# Patient Record
Sex: FEMALE | Race: WHITE | Employment: PART TIME | URBAN - METROPOLITAN AREA
[De-identification: names, ages, dates, MRNs, and addresses within clinical notes are randomized per-mention and may not be internally consistent; named-entity substitution may affect disease eponyms.]

---

## 2017-06-08 ENCOUNTER — ANESTHESIA EVENT (OUTPATIENT)
Dept: SURGERY | Facility: CLINIC | Age: 22
DRG: 132 | End: 2017-06-08
Payer: COMMERCIAL

## 2017-06-08 ENCOUNTER — HOSPITAL ENCOUNTER (INPATIENT)
Facility: CLINIC | Age: 22
LOS: 1 days | Discharge: HOME | DRG: 132 | End: 2017-06-09
Attending: DENTIST | Admitting: DENTIST
Payer: COMMERCIAL

## 2017-06-08 ENCOUNTER — ANESTHESIA (OUTPATIENT)
Dept: SURGERY | Facility: CLINIC | Age: 22
DRG: 132 | End: 2017-06-08
Payer: COMMERCIAL

## 2017-06-08 DIAGNOSIS — M26.02 MAXILLARY HYPOPLASIA: Primary | ICD-10-CM

## 2017-06-08 PROCEDURE — 25000128 H RX IP 250 OP 636: Performed by: DENTIST

## 2017-06-08 PROCEDURE — 27210794 ZZH OR GENERAL SUPPLY STERILE: Performed by: DENTIST

## 2017-06-08 PROCEDURE — 71000012 ZZH RECOVERY PHASE 1 LEVEL 1 FIRST HR: Performed by: DENTIST

## 2017-06-08 PROCEDURE — 36000093 ZZH SURGERY LEVEL 4 1ST 30 MIN: Performed by: DENTIST

## 2017-06-08 PROCEDURE — 25000566 ZZH SEVOFLURANE, EA 15 MIN: Performed by: DENTIST

## 2017-06-08 PROCEDURE — 25000128 H RX IP 250 OP 636: Performed by: NURSE ANESTHETIST, CERTIFIED REGISTERED

## 2017-06-08 PROCEDURE — 25000128 H RX IP 250 OP 636: Performed by: ANESTHESIOLOGY

## 2017-06-08 PROCEDURE — 37000008 ZZH ANESTHESIA TECHNICAL FEE, 1ST 30 MIN: Performed by: DENTIST

## 2017-06-08 PROCEDURE — 0NUR0JZ SUPPLEMENT MAXILLA WITH SYNTHETIC SUBSTITUTE, OPEN APPROACH: ICD-10-PCS | Performed by: DENTIST

## 2017-06-08 PROCEDURE — 25000125 ZZHC RX 250: Performed by: ANESTHESIOLOGY

## 2017-06-08 PROCEDURE — C1713 ANCHOR/SCREW BN/BN,TIS/BN: HCPCS | Performed by: DENTIST

## 2017-06-08 PROCEDURE — 0NSS04Z REPOSITION LEFT MAXILLA WITH INTERNAL FIXATION DEVICE, OPEN APPROACH: ICD-10-PCS | Performed by: DENTIST

## 2017-06-08 PROCEDURE — 0NSR04Z REPOSITION MAXILLA WITH INTERNAL FIXATION DEVICE, OPEN APPROACH: ICD-10-PCS | Performed by: DENTIST

## 2017-06-08 PROCEDURE — 40000169 ZZH STATISTIC PRE-PROCEDURE ASSESSMENT I: Performed by: DENTIST

## 2017-06-08 PROCEDURE — 27210995 ZZH RX 272: Performed by: DENTIST

## 2017-06-08 PROCEDURE — 12000007 ZZH R&B INTERMEDIATE

## 2017-06-08 PROCEDURE — 71000013 ZZH RECOVERY PHASE 1 LEVEL 1 EA ADDTL HR: Performed by: DENTIST

## 2017-06-08 PROCEDURE — 25000125 ZZHC RX 250: Performed by: NURSE ANESTHETIST, CERTIFIED REGISTERED

## 2017-06-08 PROCEDURE — 36000063 ZZH SURGERY LEVEL 4 EA 15 ADDTL MIN: Performed by: DENTIST

## 2017-06-08 PROCEDURE — 25000125 ZZHC RX 250: Performed by: DENTIST

## 2017-06-08 PROCEDURE — 25000132 ZZH RX MED GY IP 250 OP 250 PS 637: Performed by: DENTIST

## 2017-06-08 PROCEDURE — 0NUS0JZ: ICD-10-PCS | Performed by: DENTIST

## 2017-06-08 PROCEDURE — 37000009 ZZH ANESTHESIA TECHNICAL FEE, EACH ADDTL 15 MIN: Performed by: DENTIST

## 2017-06-08 PROCEDURE — 25000132 ZZH RX MED GY IP 250 OP 250 PS 637: Performed by: NURSE ANESTHETIST, CERTIFIED REGISTERED

## 2017-06-08 DEVICE — IMPLANTABLE DEVICE: Type: IMPLANTABLE DEVICE | Site: MAXILLA | Status: FUNCTIONAL

## 2017-06-08 DEVICE — WIRE SURGICAL STEEL 24GA 2 DS-24: Type: IMPLANTABLE DEVICE | Site: MAXILLA | Status: FUNCTIONAL

## 2017-06-08 DEVICE — WIRE SURGICAL STEEL 26GA 0 DS-26: Type: IMPLANTABLE DEVICE | Site: MAXILLA | Status: FUNCTIONAL

## 2017-06-08 DEVICE — IMP SCR STRK CROSS 1.7X4MM SELF TAP 5017004: Type: IMPLANTABLE DEVICE | Site: MAXILLA | Status: FUNCTIONAL

## 2017-06-08 RX ORDER — SODIUM CHLORIDE, SODIUM LACTATE, POTASSIUM CHLORIDE, CALCIUM CHLORIDE 600; 310; 30; 20 MG/100ML; MG/100ML; MG/100ML; MG/100ML
INJECTION, SOLUTION INTRAVENOUS CONTINUOUS
Status: DISCONTINUED | OUTPATIENT
Start: 2017-06-08 | End: 2017-06-08 | Stop reason: HOSPADM

## 2017-06-08 RX ORDER — MAGNESIUM HYDROXIDE 1200 MG/15ML
LIQUID ORAL PRN
Status: DISCONTINUED | OUTPATIENT
Start: 2017-06-08 | End: 2017-06-08 | Stop reason: HOSPADM

## 2017-06-08 RX ORDER — ONDANSETRON 2 MG/ML
4 INJECTION INTRAMUSCULAR; INTRAVENOUS EVERY 30 MIN PRN
Status: DISCONTINUED | OUTPATIENT
Start: 2017-06-08 | End: 2017-06-08 | Stop reason: HOSPADM

## 2017-06-08 RX ORDER — OXYMETAZOLINE HYDROCHLORIDE 0.05 G/100ML
SPRAY NASAL PRN
Status: DISCONTINUED | OUTPATIENT
Start: 2017-06-08 | End: 2017-06-08

## 2017-06-08 RX ORDER — ONDANSETRON 2 MG/ML
8 INJECTION INTRAMUSCULAR; INTRAVENOUS EVERY 6 HOURS PRN
Status: DISCONTINUED | OUTPATIENT
Start: 2017-06-08 | End: 2017-06-09 | Stop reason: HOSPADM

## 2017-06-08 RX ORDER — METOCLOPRAMIDE HYDROCHLORIDE 5 MG/ML
10 INJECTION INTRAMUSCULAR; INTRAVENOUS EVERY 6 HOURS PRN
Status: DISCONTINUED | OUTPATIENT
Start: 2017-06-08 | End: 2017-06-09 | Stop reason: HOSPADM

## 2017-06-08 RX ORDER — GLYCOPYRROLATE 0.2 MG/ML
INJECTION, SOLUTION INTRAMUSCULAR; INTRAVENOUS PRN
Status: DISCONTINUED | OUTPATIENT
Start: 2017-06-08 | End: 2017-06-08

## 2017-06-08 RX ORDER — CHLORHEXIDINE GLUCONATE ORAL RINSE 1.2 MG/ML
15 SOLUTION DENTAL 2 TIMES DAILY
Status: DISCONTINUED | OUTPATIENT
Start: 2017-06-08 | End: 2017-06-09 | Stop reason: HOSPADM

## 2017-06-08 RX ORDER — FENTANYL CITRATE 50 UG/ML
INJECTION, SOLUTION INTRAMUSCULAR; INTRAVENOUS PRN
Status: DISCONTINUED | OUTPATIENT
Start: 2017-06-08 | End: 2017-06-08

## 2017-06-08 RX ORDER — METHYLPREDNISOLONE SODIUM SUCCINATE 125 MG/2ML
125 INJECTION, POWDER, LYOPHILIZED, FOR SOLUTION INTRAMUSCULAR; INTRAVENOUS EVERY 4 HOURS
Status: COMPLETED | OUTPATIENT
Start: 2017-06-08 | End: 2017-06-08

## 2017-06-08 RX ORDER — CHLORHEXIDINE GLUCONATE ORAL RINSE 1.2 MG/ML
10 SOLUTION DENTAL ONCE
Status: COMPLETED | OUTPATIENT
Start: 2017-06-08 | End: 2017-06-08

## 2017-06-08 RX ORDER — CHLORHEXIDINE GLUCONATE ORAL RINSE 1.2 MG/ML
SOLUTION DENTAL PRN
Status: DISCONTINUED | OUTPATIENT
Start: 2017-06-08 | End: 2017-06-08 | Stop reason: HOSPADM

## 2017-06-08 RX ORDER — ONDANSETRON 4 MG/1
4 TABLET, ORALLY DISINTEGRATING ORAL EVERY 6 HOURS PRN
Status: DISCONTINUED | OUTPATIENT
Start: 2017-06-08 | End: 2017-06-09 | Stop reason: HOSPADM

## 2017-06-08 RX ORDER — CEFAZOLIN SODIUM 2 G/100ML
2 INJECTION, SOLUTION INTRAVENOUS
Status: COMPLETED | OUTPATIENT
Start: 2017-06-08 | End: 2017-06-08

## 2017-06-08 RX ORDER — PROPOFOL 10 MG/ML
INJECTION, EMULSION INTRAVENOUS PRN
Status: DISCONTINUED | OUTPATIENT
Start: 2017-06-08 | End: 2017-06-08

## 2017-06-08 RX ORDER — ACETAMINOPHEN 325 MG/1
975 TABLET ORAL EVERY 8 HOURS
Status: DISCONTINUED | OUTPATIENT
Start: 2017-06-08 | End: 2017-06-09 | Stop reason: HOSPADM

## 2017-06-08 RX ORDER — NEOSTIGMINE METHYLSULFATE 1 MG/ML
VIAL (ML) INJECTION PRN
Status: DISCONTINUED | OUTPATIENT
Start: 2017-06-08 | End: 2017-06-08

## 2017-06-08 RX ORDER — OXYMETAZOLINE HYDROCHLORIDE 0.05 G/100ML
2 SPRAY NASAL 2 TIMES DAILY PRN
Status: DISCONTINUED | OUTPATIENT
Start: 2017-06-08 | End: 2017-06-09 | Stop reason: HOSPADM

## 2017-06-08 RX ORDER — NALOXONE HYDROCHLORIDE 0.4 MG/ML
.1-.4 INJECTION, SOLUTION INTRAMUSCULAR; INTRAVENOUS; SUBCUTANEOUS
Status: DISCONTINUED | OUTPATIENT
Start: 2017-06-08 | End: 2017-06-09 | Stop reason: HOSPADM

## 2017-06-08 RX ORDER — HYDROMORPHONE HYDROCHLORIDE 1 MG/ML
.3-.5 INJECTION, SOLUTION INTRAMUSCULAR; INTRAVENOUS; SUBCUTANEOUS EVERY 5 MIN PRN
Status: DISCONTINUED | OUTPATIENT
Start: 2017-06-08 | End: 2017-06-08 | Stop reason: HOSPADM

## 2017-06-08 RX ORDER — PROCHLORPERAZINE MALEATE 5 MG
5-10 TABLET ORAL EVERY 6 HOURS PRN
Status: DISCONTINUED | OUTPATIENT
Start: 2017-06-08 | End: 2017-06-09 | Stop reason: HOSPADM

## 2017-06-08 RX ORDER — METOCLOPRAMIDE 5 MG/1
10 TABLET ORAL EVERY 6 HOURS PRN
Status: DISCONTINUED | OUTPATIENT
Start: 2017-06-08 | End: 2017-06-09 | Stop reason: HOSPADM

## 2017-06-08 RX ORDER — PSEUDOEPHEDRINE HCL 60 MG
60 TABLET ORAL EVERY 6 HOURS PRN
Status: DISCONTINUED | OUTPATIENT
Start: 2017-06-08 | End: 2017-06-09 | Stop reason: HOSPADM

## 2017-06-08 RX ORDER — SODIUM CHLORIDE, SODIUM LACTATE, POTASSIUM CHLORIDE, CALCIUM CHLORIDE 600; 310; 30; 20 MG/100ML; MG/100ML; MG/100ML; MG/100ML
INJECTION, SOLUTION INTRAVENOUS CONTINUOUS
Status: DISCONTINUED | OUTPATIENT
Start: 2017-06-08 | End: 2017-06-09 | Stop reason: HOSPADM

## 2017-06-08 RX ORDER — LIDOCAINE 40 MG/G
CREAM TOPICAL
Status: DISCONTINUED | OUTPATIENT
Start: 2017-06-08 | End: 2017-06-09 | Stop reason: HOSPADM

## 2017-06-08 RX ORDER — OXYCODONE HYDROCHLORIDE 5 MG/1
5-10 TABLET ORAL
Status: DISCONTINUED | OUTPATIENT
Start: 2017-06-08 | End: 2017-06-09 | Stop reason: HOSPADM

## 2017-06-08 RX ORDER — METHYLPREDNISOLONE SODIUM SUCCINATE 125 MG/2ML
125 INJECTION, POWDER, LYOPHILIZED, FOR SOLUTION INTRAMUSCULAR; INTRAVENOUS EVERY 6 HOURS
Status: COMPLETED | OUTPATIENT
Start: 2017-06-09 | End: 2017-06-09

## 2017-06-08 RX ORDER — CEFAZOLIN SODIUM 1 G/3ML
1 INJECTION, POWDER, FOR SOLUTION INTRAMUSCULAR; INTRAVENOUS EVERY 8 HOURS
Status: DISCONTINUED | OUTPATIENT
Start: 2017-06-08 | End: 2017-06-09 | Stop reason: HOSPADM

## 2017-06-08 RX ORDER — LIDOCAINE HYDROCHLORIDE 20 MG/ML
INJECTION, SOLUTION INFILTRATION; PERINEURAL PRN
Status: DISCONTINUED | OUTPATIENT
Start: 2017-06-08 | End: 2017-06-08

## 2017-06-08 RX ORDER — REMIFENTANIL HYDROCHLORIDE 1 MG/ML
INJECTION, POWDER, LYOPHILIZED, FOR SOLUTION INTRAVENOUS PRN
Status: DISCONTINUED | OUTPATIENT
Start: 2017-06-08 | End: 2017-06-08

## 2017-06-08 RX ORDER — MORPHINE SULFATE 2 MG/ML
2-4 INJECTION, SOLUTION INTRAMUSCULAR; INTRAVENOUS
Status: DISCONTINUED | OUTPATIENT
Start: 2017-06-08 | End: 2017-06-09 | Stop reason: HOSPADM

## 2017-06-08 RX ORDER — FENTANYL CITRATE 50 UG/ML
25-50 INJECTION, SOLUTION INTRAMUSCULAR; INTRAVENOUS
Status: DISCONTINUED | OUTPATIENT
Start: 2017-06-08 | End: 2017-06-08 | Stop reason: HOSPADM

## 2017-06-08 RX ORDER — BENZOCAINE/MENTHOL 6 MG-10 MG
LOZENGE MUCOUS MEMBRANE PRN
Status: DISCONTINUED | OUTPATIENT
Start: 2017-06-08 | End: 2017-06-08 | Stop reason: HOSPADM

## 2017-06-08 RX ORDER — ONDANSETRON 4 MG/1
4 TABLET, ORALLY DISINTEGRATING ORAL EVERY 30 MIN PRN
Status: DISCONTINUED | OUTPATIENT
Start: 2017-06-08 | End: 2017-06-08 | Stop reason: HOSPADM

## 2017-06-08 RX ORDER — KETOROLAC TROMETHAMINE 30 MG/ML
30 INJECTION, SOLUTION INTRAMUSCULAR; INTRAVENOUS EVERY 6 HOURS PRN
Status: DISCONTINUED | OUTPATIENT
Start: 2017-06-08 | End: 2017-06-09 | Stop reason: HOSPADM

## 2017-06-08 RX ORDER — ONDANSETRON 2 MG/ML
INJECTION INTRAMUSCULAR; INTRAVENOUS PRN
Status: DISCONTINUED | OUTPATIENT
Start: 2017-06-08 | End: 2017-06-08

## 2017-06-08 RX ORDER — ACETAMINOPHEN 325 MG/1
650 TABLET ORAL EVERY 4 HOURS PRN
Status: DISCONTINUED | OUTPATIENT
Start: 2017-06-11 | End: 2017-06-09 | Stop reason: HOSPADM

## 2017-06-08 RX ADMIN — MIDAZOLAM HYDROCHLORIDE 2 MG: 1 INJECTION, SOLUTION INTRAMUSCULAR; INTRAVENOUS at 07:25

## 2017-06-08 RX ADMIN — PROPOFOL 200 MG: 10 INJECTION, EMULSION INTRAVENOUS at 07:31

## 2017-06-08 RX ADMIN — PROPOFOL 50 MG: 10 INJECTION, EMULSION INTRAVENOUS at 08:12

## 2017-06-08 RX ADMIN — MIDAZOLAM HYDROCHLORIDE 2 MG: 1 INJECTION, SOLUTION INTRAMUSCULAR; INTRAVENOUS at 07:31

## 2017-06-08 RX ADMIN — CEFAZOLIN SODIUM 2 G: 2 INJECTION, SOLUTION INTRAVENOUS at 07:45

## 2017-06-08 RX ADMIN — OXYMETAZOLINE HYDROCHLORIDE 2 SPRAY: 5 SPRAY NASAL at 07:33

## 2017-06-08 RX ADMIN — PHENYLEPHRINE HYDROCHLORIDE 100 MCG: 10 INJECTION, SOLUTION INTRAMUSCULAR; INTRAVENOUS; SUBCUTANEOUS at 08:32

## 2017-06-08 RX ADMIN — HYDROMORPHONE HYDROCHLORIDE 0.5 MG: 1 INJECTION, SOLUTION INTRAMUSCULAR; INTRAVENOUS; SUBCUTANEOUS at 10:01

## 2017-06-08 RX ADMIN — ROCURONIUM BROMIDE 50 MG: 10 INJECTION INTRAVENOUS at 07:31

## 2017-06-08 RX ADMIN — PROPOFOL 50 MG: 10 INJECTION, EMULSION INTRAVENOUS at 08:14

## 2017-06-08 RX ADMIN — CHLORHEXIDINE GLUCONATE 15 ML: 1.2 RINSE ORAL at 20:23

## 2017-06-08 RX ADMIN — FENTANYL CITRATE 150 MCG: 50 INJECTION, SOLUTION INTRAMUSCULAR; INTRAVENOUS at 07:31

## 2017-06-08 RX ADMIN — METHYLPREDNISOLONE SODIUM SUCCINATE 125 MG: 125 INJECTION, POWDER, FOR SOLUTION INTRAMUSCULAR; INTRAVENOUS at 12:57

## 2017-06-08 RX ADMIN — SODIUM CHLORIDE 250 MG: 9 INJECTION, SOLUTION INTRAVENOUS at 07:27

## 2017-06-08 RX ADMIN — LIDOCAINE HYDROCHLORIDE 0.3 ML: 10 INJECTION, SOLUTION EPIDURAL; INFILTRATION; INTRACAUDAL; PERINEURAL at 06:39

## 2017-06-08 RX ADMIN — PHENYLEPHRINE HYDROCHLORIDE 50 MCG: 10 INJECTION, SOLUTION INTRAMUSCULAR; INTRAVENOUS; SUBCUTANEOUS at 08:44

## 2017-06-08 RX ADMIN — METHYLPREDNISOLONE SODIUM SUCCINATE 125 MG: 125 INJECTION, POWDER, FOR SOLUTION INTRAMUSCULAR; INTRAVENOUS at 16:35

## 2017-06-08 RX ADMIN — METHYLPREDNISOLONE SODIUM SUCCINATE 125 MG: 125 INJECTION, POWDER, FOR SOLUTION INTRAMUSCULAR; INTRAVENOUS at 20:23

## 2017-06-08 RX ADMIN — PHENYLEPHRINE HYDROCHLORIDE 100 MCG: 10 INJECTION, SOLUTION INTRAMUSCULAR; INTRAVENOUS; SUBCUTANEOUS at 08:24

## 2017-06-08 RX ADMIN — PHENYLEPHRINE HYDROCHLORIDE 100 MCG: 10 INJECTION, SOLUTION INTRAMUSCULAR; INTRAVENOUS; SUBCUTANEOUS at 08:25

## 2017-06-08 RX ADMIN — SODIUM CHLORIDE, POTASSIUM CHLORIDE, SODIUM LACTATE AND CALCIUM CHLORIDE: 600; 310; 30; 20 INJECTION, SOLUTION INTRAVENOUS at 12:58

## 2017-06-08 RX ADMIN — CEFAZOLIN SODIUM 1 G: 1 INJECTION, POWDER, FOR SOLUTION INTRAMUSCULAR; INTRAVENOUS at 16:35

## 2017-06-08 RX ADMIN — REMIFENTANIL HYDROCHLORIDE 72.5 MCG: 1 INJECTION, POWDER, LYOPHILIZED, FOR SOLUTION INTRAVENOUS at 08:18

## 2017-06-08 RX ADMIN — DEXMEDETOMIDINE HYDROCHLORIDE 0.3 MCG/KG/HR: 100 INJECTION, SOLUTION INTRAVENOUS at 07:28

## 2017-06-08 RX ADMIN — ACETAMINOPHEN 975 MG: 325 TABLET, FILM COATED ORAL at 18:52

## 2017-06-08 RX ADMIN — GLYCOPYRROLATE 0.6 MG: 0.2 INJECTION, SOLUTION INTRAMUSCULAR; INTRAVENOUS at 09:10

## 2017-06-08 RX ADMIN — CHLORHEXIDINE GLUCONATE 10 ML: 1.2 RINSE ORAL at 06:40

## 2017-06-08 RX ADMIN — LIDOCAINE HYDROCHLORIDE 100 MG: 20 INJECTION, SOLUTION INFILTRATION; PERINEURAL at 07:31

## 2017-06-08 RX ADMIN — REMIFENTANIL HYDROCHLORIDE 0.2 MCG/KG/MIN: 1 INJECTION, POWDER, LYOPHILIZED, FOR SOLUTION INTRAVENOUS at 08:15

## 2017-06-08 RX ADMIN — HYDROMORPHONE HYDROCHLORIDE 0.5 MG: 1 INJECTION, SOLUTION INTRAMUSCULAR; INTRAVENOUS; SUBCUTANEOUS at 09:29

## 2017-06-08 RX ADMIN — SODIUM CHLORIDE, POTASSIUM CHLORIDE, SODIUM LACTATE AND CALCIUM CHLORIDE: 600; 310; 30; 20 INJECTION, SOLUTION INTRAVENOUS at 08:24

## 2017-06-08 RX ADMIN — SODIUM CHLORIDE, POTASSIUM CHLORIDE, SODIUM LACTATE AND CALCIUM CHLORIDE: 600; 310; 30; 20 INJECTION, SOLUTION INTRAVENOUS at 06:39

## 2017-06-08 RX ADMIN — NEOSTIGMINE METHYLSULFATE 3 MG: 1 INJECTION INTRAMUSCULAR; INTRAVENOUS; SUBCUTANEOUS at 09:10

## 2017-06-08 RX ADMIN — ONDANSETRON 4 MG: 2 INJECTION INTRAMUSCULAR; INTRAVENOUS at 09:02

## 2017-06-08 RX ADMIN — HYDROMORPHONE HYDROCHLORIDE 0.3 MG: 1 INJECTION, SOLUTION INTRAMUSCULAR; INTRAVENOUS; SUBCUTANEOUS at 10:30

## 2017-06-08 RX ADMIN — FENTANYL CITRATE 50 MCG: 50 INJECTION, SOLUTION INTRAMUSCULAR; INTRAVENOUS at 08:12

## 2017-06-08 RX ADMIN — OXYMETAZOLINE HYDROCHLORIDE 2 SPRAY: 5 SPRAY NASAL at 07:25

## 2017-06-08 RX ADMIN — SODIUM CHLORIDE, POTASSIUM CHLORIDE, SODIUM LACTATE AND CALCIUM CHLORIDE: 600; 310; 30; 20 INJECTION, SOLUTION INTRAVENOUS at 20:23

## 2017-06-08 ASSESSMENT — LIFESTYLE VARIABLES: TOBACCO_USE: 0

## 2017-06-08 ASSESSMENT — ENCOUNTER SYMPTOMS: SEIZURES: 0

## 2017-06-08 NOTE — ANESTHESIA PREPROCEDURE EVALUATION
Anesthesia Evaluation     . Pt has had prior anesthetic.     History of anesthetic complications: family history of awareness.          ROS/MED HX    ENT/Pulmonary:      (-) tobacco use and sleep apnea   Neurologic:      (-) seizures   Cardiovascular:        (-) hypertension   METS/Exercise Tolerance:     Hematologic:         Musculoskeletal:         GI/Hepatic:        (-) GERD and liver disease   Renal/Genitourinary:      (-) renal disease   Endo:      (-) Type II DM and thyroid disease   Psychiatric:         Infectious Disease:        (-) Recent Fever   Malignancy:         Other:                     Physical Exam  Normal systems: cardiovascular, pulmonary and dental    Airway   Mallampati: III  TM distance: >3 FB  Neck ROM: full    Dental     Cardiovascular       Pulmonary                     Anesthesia Plan      History & Physical Review  History and physical reviewed and following examination; no interval change.    ASA Status:  1 .    NPO Status:  > 8 hours    Plan for General and ETT with Propofol induction. Maintenance will be Balanced.    PONV prophylaxis:  Ondansetron (or other 5HT-3) and Dexamethasone or Solumedrol  Versed pre op   Afrin pre op  precedex gtt      Postoperative Care  Postoperative pain management:  IV analgesics.      Consents  Anesthetic plan, risks, benefits and alternatives discussed with:  Patient and Parent (Mother and/or Father)..                          .

## 2017-06-08 NOTE — IP AVS SNAPSHOT
[facility] [facility] [facility]    [address] [place] [address]    Phone:  [phone]                                       After Visit Summary   6/8/2017    Robert Bazzi    MRN: [id number]           After Visit Summary Signature Page     I have received my discharge instructions, and my questions have been answered. I have discussed any challenges I see with this plan with the nurse or doctor.    ..........................................................................................................................................  Patient/Patient Representative Signature      ..........................................................................................................................................  Patient Representative Print Name and Relationship to Patient    ..................................................               ................................................  Date                                            Time    ..........................................................................................................................................  Reviewed by Signature/Title    ...................................................              ..............................................  Date                                                            Time

## 2017-06-08 NOTE — BRIEF OP NOTE
[facility] Brief Operative Note    Pre-operative diagnosis: MAXILLARY HYPOPLASIS, MANDIBULAR HYPERPLASIA     Post-operative diagnosis maxillary hypoplasia     Procedure: Procedure(s):  LEF FORT ONE OSTEOTOMY - Wound Class: II-Clean Contaminated   Surgeon(s): Surgeon(s) and Role:     * Murray Tran DDS - Primary     * Zhou Chaudhari DDS - Assisting   Estimated blood loss: * No values recorded between 6/8/2017  8:03 AM and 6/8/2017  9:23 AM *    Specimens: * No specimens in log *   Findings: As dictated

## 2017-06-08 NOTE — ANESTHESIA POSTPROCEDURE EVALUATION
Patient: Robert Bazzi    Procedure(s):  LEF FORT ONE OSTEOTOMY - Wound Class: II-Clean Contaminated    Diagnosis:MAXILLARY HYPOPLASIS, MANDIBULAR HYPERPLASIA    Diagnosis Additional Information: No value filed.    Anesthesia Type:  General, ETT    Note:  Anesthesia Post Evaluation    Patient location during evaluation: PACU  Patient participation: Able to fully participate in evaluation  Level of consciousness: awake and alert  Pain management: satisfactory to patient  Airway patency: patent  Cardiovascular status: hemodynamically stable  Respiratory status: acceptable and unassisted  Hydration status: balanced  PONV: none     Anesthetic complications: None          Last vitals:  Vitals:    06/08/17 1040 06/08/17 1052 06/08/17 1100   BP: 147/88  (P) 132/80   Pulse:   (P) 85   Resp: 16  (P) 14   Temp: 37.1  C (98.8  F)  (P) 37  C (98.6  F)   SpO2: 98% 99% (P) 98%         Electronically Signed By: Robert Nathan MD  June 8, 2017  11:08 AM

## 2017-06-08 NOTE — OP NOTE
PREOPERATIVE DIAGNOSES:     1.  Maxillary hypoplasia.   2.  Mandibular prognathism.   3.  Class III skeletal malocclusion.      POSTOPERATIVE DIAGNOSES:     1.  Maxillary hypoplasia.   2.  Mandibular prognathism.   3.  Class III skeletal malocclusion.      PROCEDURE:  Maxillary LeFort I level advancement with rigid fixation and elastic intermaxillary fixation.      SURGEON:  Zhou Chaudhari DDS      ASSISTANT:  Murray Tran DDS, MD      ESTIMATED BLOOD LOSS:  100 cc, none of which was replaced.        DESCRIPTION OF PROCEDURE:  The patient Robert Bazzi was taken to the operating room at [facility] on 06/08/2017.  He was placed in the supine position on the operating room table, and a general anesthesia was established.  Prepping and draping were done in the usual manner for an intraoral orthognathic surgical procedure, and 2% lidocaine with epinephrine was infiltrated into the maxillary labial sulcus for purposes of hemostasis.  Reverse Trendelenburg positioning was utilized.  A throat pack was placed.        A #15 blade was used to make an incision in the maxillary vestibule.  This was carried down to bone with electrocautery.  Lateral maxillary wall exposure was performed with careful preservation of mucosa at the piriform rim and piriform region.        LeFort I level cuts were made through the lateral wall, medial wall, posterior wall, and nasal crest of maxilla with down-fracture and mobilization.  Both descending palatine arteries were identified and cauterized.  Further mobilization was accomplished, and the patient was placed in temporary intermaxillary fixation with a surgical splint and 24-gauge wire and 26-gauge wire.        We wanted to first determine if we could accomplish the desired correction in the maxilla only.  It became apparent that that would be the best approach.  We positioned the maxilla with careful notching at the zygomatic buttress region to create a mortise locking  effect in the posterior maxilla.  We impacted the anterior maxilla approximately 3-4 mm for good bone overlap and positioning.  The nasal septum was reduced, and the anterior nasal spine was reduced to accommodate the Le Fort I advancement.        The maxilla was then positioned with two 24-gauge direct bone wires at the zygomatic buttress and 2 custom-bent Bennie plates of the Marquise Mouse variety with 2 screws along the lateral maxillary wall and 2 screws into the advanced re-positioned maxilla.  These screws were all 4 mm in length.  The fixation was released, and the occlusion was found to be as per the model surgery.  It was our decision that we did not have to do the mandible.  This will be better from an airway standpoint.        The surgical areas were irrigated and suctioned, and the wound was closed with a running 4-0 Vicryl.  The patient was placed in light elastics of the camel strength with a box pattern, 1 elastic on the left and 2 elastics on the right.  This concluded the procedure without complication.  The patient was taken to the Recovery Room in satisfactory condition.      PLAN:  The patient will be extubated as per Anesthesia protocol in the Recovery Room.         MARAH SMITH DDS, MD             D: 2017 09:23   T: 2017 13:31   MT: HALINA#[id number]      Name:     ALEXIS LYLES   MRN:      [id number]        Account:        [id number]   :      1995           Procedure Date: 2017      Document: [id number]

## 2017-06-08 NOTE — ANESTHESIA CARE TRANSFER NOTE
Patient: Robert Bazzi    Procedure(s):  LEF FORT ONE OSTEOTOMY - Wound Class: II-Clean Contaminated    Diagnosis: MAXILLARY HYPOPLASIS, MANDIBULAR HYPERPLASIA    Diagnosis Additional Information: No value filed.    Anesthesia Type:   General, ETT     Note:  Airway :Face Mask  Patient transferred to:PACU  Comments: Transferred to PACU, p(t placed in side lying position, spontaneous respirations, 10L oxygen via facemask.  All monitors and alarms on and functioning, VSS.  Patient awake, comfortable.  Report to PACU RN.      Vitals: (Last set prior to Anesthesia Care Transfer)    CRNA VITALS  6/8/2017 0859 - 6/8/2017 0942      6/8/2017             Resp Rate (set): 10                Electronically Signed By: SCOTT Emmanuel CRNA  June 8, 2017  9:42 AM

## 2017-06-08 NOTE — CONSULTS
Alexis Bazzi is a 21-year-old gentleman who underwent Le Fort I osteotomy advancement  this morning under the care of Walter Chaudhari DDS and Murray Tran DDS, MD.  Surgery went in noncomplicated fashion and his postoperative course has been stable.  He is resting comfortably but easily arousable.  His edema is minimal.  His maxilla is pink.  His midlines are on center and his occlusion is excellent.  His tooth-lip relationship and vertical maxillary position is ideal.  Overall, this is excellent progress and he will be continued on his supportive care and intravenous antibiotic and steroid protocols.  We anticipate discharge midday tomorrow.         WALTER CHAUDHARI DDS             D: 2017 14:22   T: 2017 15:06   MT: JUAN PABLO      Name:     ALEXIS BAZZI   MRN:      [id number]        Account:       [id number]   :      1995           Consult Date:  2017      Document: [id number]

## 2017-06-08 NOTE — PROGRESS NOTES
Admission medication history interview status for the 6/8/2017  admission is complete. See EPIC admission navigator for prior to admission medications     Medication history source reliability:Good    Medication history interview source(s):Patient    Medication history resources (including written lists, pill bottles, clinic record):None    Primary pharmacy.[facility]    Additional medication history information not noted on PTA med list :None    Time spent in this activity: 35 minutes    Prior to Admission medications    Not on File

## 2017-06-08 NOTE — CONSULTS
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  Diagnoses include maxillary hypoplasia, mandibular prognathism, class III malocclusion.  Our plan is for a maxillary LeFort I level advancement with rigid fixation combined with a bilateral sagittal split mandibular set back osteotomy.  We will look at a maxillary LeFort I osteotomy only to solve his occlusal discrepancy; however, we are of the opinion he likely will need a mandibular osteotomy as well.  The decision will be based upon stability and bone thickness at the piriform rim.  Preoperative orthodontics has been done by Dr. Madhav Mayen to align and level teeth in the maxillary mandibular skeletal structures.  Preoperative history and physical has revealed no contraindications to our planned procedure.  Surgeons will be Zhou Chaudhari DDS and Marah Smith DDS, MD.  The family understands we will do our very best; however, no guarantees can be made regarding a good result nor freedom from a complication.  Surgical splints have been constructed by Dr. Zhou Chaudhari in conference with Dr. Madhav Mayen.  Those splints were tried in yesterday and found to fit satisfactorily.  Informed consent has been reviewed and signed.        We have reviewed hospitalization, anesthesia and the risks of the maxillary osteotomy and mandibular osteotomy in terms of nerve issues, sinus problems, TMJ problems, hardware removal, endodontic therapy to teeth, relapse, bleeding, swelling, infection and other details.  Our plan is for rigid fixation and elastic intermaxillary fixation.  In rare instances wire intermaxillary fixation for 6 weeks is required.  We are trying to avoid that.  We are proceeding this morning with maxillary advancement and mandibular setback under general anesthesia.         MARAH SMITH DDS, MD             D: 06/08/2017 07:36   T: 06/08/2017 08:16   MT: #[id number]       Name:     ALEXIS LYLES   MRN:      [id number]        Account:       [id number]   :      1995           Consult Date:  2017      Document: [id number]

## 2017-06-08 NOTE — IP AVS SNAPSHOT
MRN:[id number]                      After Visit Summary   6/8/2017    Robert Bazzi    MRN: [id number]           Thank you!     Thank you for choosing [facility] for your care. Our goal is always to provide you with excellent care. Hearing back from our patients is one way we can continue to improve our services. Please take a few minutes to complete the written survey that you may receive in the mail after you visit with us. Thank you!        Patient Information     Date Of Birth          1995        Designated Caregiver       Most Recent Value    Caregiver    Will someone help with your care after discharge? yes    Name of designated caregiver Lori /parents    Phone number of caregiver [phone]    Caregiver address [place]      About your hospital stay     You were admitted on:  June 8, 2017 You last received care in the:  [facility] [address] [facility]    You were discharged on:  June 9, 2017       Who to Call     For medical emergencies, please call 911.  For non-urgent questions about your medical care, please call your primary care provider or clinic, [phone]  For questions related to your surgery, please call your surgery clinic        Attending Provider     Provider Specialty    Murray Tran DDS Oral Surgery       Primary Care Provider Office Phone # Fax #    Mulugeta Martinez [phone]      Further instructions from your care team       Discharge Instructions following Jaw Surgery  [facility]    Diet:    Follow instructions per the dietician.  Activities:    No contact sports.    No running.    No weight lifting.    If swimming, no head under water.    Solitary, quiet exercise is okay.    No vigorous exercise or swimming should be allowed because of the difficulty in breathing and the strain on your fixation wires (if wired).  To facilitated breathing, a decongestant stray (ex. Afrin Nasal  Spray) should be bought at a pharmacy and carried with you for cases of nasal congestion.  This should be used SPARINGLY.  Bathing/Incision Care    It is important to practice good oral hygiene.  This is VERY important to stop the possibility of rapid decay of the teeth and infection.    Post-operative day 2:  o Brush your teeth 6-8 times a day.  A small, child-sized, soft toothbrush can be used on the outside of teeth to keep the wire and teeth free of debris.  o Don t use Water-Pik until you have checked with your doctor.  o Make sure hooks and power tubes on braces are clean.  o Drink clear liquids after meals and check inside your mouth with your tongue for any debris.  o Do several warm salt-rinses daily (better than mouth rinses that include alcohol in their contents).  o Use smaller amounts of toothpaste.  What to expect:    Swelling following this type of surgery is completely normal.  Generally swelling increases for about 48-72 hours after surgery then begins to decrease gradually.  One half of the swelling will be gone in 7-10 days; however, it is normal for a small amount of swelling to persist for 4-6 weeks.    Bowel habits may change during your fixation period.  Following surgery, it is common to not have a bowel movement for several days.  If this becomes a problem consult your oral surgeon at one of your routine appointments.    Nausea is no cause for alarm.  Remember that even if you should be nauseated and vomit, everything that is in your stomach has been strained through your teeth.  However, at the first sign of persistent, significant nausea, call your oral surgeon and he/she will prescribe anti-nausea medication for you in a suppository form.    Medications which have been prescribed for you are also important.  If an antibiotic has been prescribed, it is very important to continue this preparation as directed until it has all been taken.  Also, a liquid pain medication can be taken as  "directly only if needed f or discomfort.  Call your surgeon if you have these signs or symptoms:    First sign of persistent, significant nausea    Fever/chills greater than 101 oF.    Pain not relieved with pain medicine.    With any questions or concerns.    Feel free to call the office should any problems develop.  The doctor who is on-call will be able to assist you.  Should an immediate emergency develop, go to the nearest hospital emergency room.    Take all medications and follow-up as instructed by your surgeon.    If wired, you should carry your wire cutters with you at all times to be used to cut all the vertical wires between your upper and lower teeth in case of emergency.        Pending Results     No orders found for last 3 day(s).            Statement of Approval     Ordered          06/09/17 0706  I have reviewed and agree with all the recommendations and orders detailed in this document.  EFFECTIVE NOW     Approved and electronically signed by:  Zhou Chaudhari DDS             Admission Information     Date & Time Provider Department Dept. Phone    6/8/2017 Murray Tran DDS [place] [address] [facility] [phone]      Your Vitals Were     Blood Pressure Pulse Temperature Respirations Height Weight    118/65 83 97.6  F (36.4  C) (Axillary) 16 1.803 m (5' 11\") 72.5 kg (159 lb 14.4 oz)    Pulse Oximetry BMI (Body Mass Index)                98% 22.3 kg/m2          MyChart Information     [employer] lets you send messages to your doctor, view your test results, renew your prescriptions, schedule appointments and more. To sign up, go to [email or web address] . Click on \"Log in\" on the left side of the screen, which will take you to the Welcome page. Then click on \"Sign up Now\" on the right side of the page.     You will be asked to enter the access code listed below, as well as some personal information. Please follow the directions to create your username and password.     Your " access code is: [email or web address]  Expires: 2017  9:11 AM     Your access code will  in 90 days. If you need help or a new code, please call your [facility] clinic or [phone].        Care EveryWhere ID     This is your Care EveryWhere ID. This could be used by other organizations to access your [facility] medical records  [id number]           Review of your medicines      START taking        Dose / Directions    ondansetron 4 MG ODT tab   Commonly known as:  ZOFRAN-ODT        Dose:  4 mg   Take 1 tablet (4 mg) by mouth every 6 hours as needed for nausea   Quantity:  12 tablet   Refills:  1            Where to get your medicines      These medications were sent to [facility] - [address]  [address], [place] [address]     Phone:  [phone]     ondansetron 4 MG ODT tab                Protect others around you: Learn how to safely use, store and throw away your medicines at www.disposemymeds.org.             Medication List: This is a list of all your medications and when to take them. Check marks below indicate your daily home schedule. Keep this list as a reference.      Medications           Morning Afternoon Evening Bedtime As Needed    ondansetron 4 MG ODT tab   Commonly known as:  ZOFRAN-ODT   Take 1 tablet (4 mg) by mouth every 6 hours as needed for nausea

## 2017-06-08 NOTE — PLAN OF CARE
Problem: Goal Outcome Summary  Goal: Goal Outcome Summary  Outcome: No Change  Pt still sleepy from surgery, arouses to voice, denies the need for pain medications at this time, tolerates sips of clears, ice pack and humidified face tent in place,

## 2017-06-09 VITALS
WEIGHT: 159.9 LBS | HEART RATE: 83 BPM | TEMPERATURE: 97.6 F | BODY MASS INDEX: 22.39 KG/M2 | HEIGHT: 71 IN | RESPIRATION RATE: 16 BRPM | DIASTOLIC BLOOD PRESSURE: 65 MMHG | SYSTOLIC BLOOD PRESSURE: 118 MMHG | OXYGEN SATURATION: 98 %

## 2017-06-09 PROCEDURE — 25000128 H RX IP 250 OP 636: Mod: JW | Performed by: DENTIST

## 2017-06-09 PROCEDURE — 25000132 ZZH RX MED GY IP 250 OP 250 PS 637: Performed by: DENTIST

## 2017-06-09 RX ORDER — ONDANSETRON 4 MG/1
4 TABLET, ORALLY DISINTEGRATING ORAL EVERY 6 HOURS PRN
Qty: 12 TABLET | Refills: 1 | Status: SHIPPED | OUTPATIENT
Start: 2017-06-09 | End: 2020-10-16

## 2017-06-09 RX ORDER — METHYLPREDNISOLONE SODIUM SUCCINATE 125 MG/2ML
INJECTION, POWDER, LYOPHILIZED, FOR SOLUTION INTRAMUSCULAR; INTRAVENOUS
Status: DISCONTINUED
Start: 2017-06-09 | End: 2017-06-09 | Stop reason: HOSPADM

## 2017-06-09 RX ADMIN — CEFAZOLIN SODIUM 1 G: 1 INJECTION, POWDER, FOR SOLUTION INTRAMUSCULAR; INTRAVENOUS at 07:55

## 2017-06-09 RX ADMIN — METHYLPREDNISOLONE SODIUM SUCCINATE 125 MG: 125 INJECTION, POWDER, FOR SOLUTION INTRAMUSCULAR; INTRAVENOUS at 06:24

## 2017-06-09 RX ADMIN — METHYLPREDNISOLONE SODIUM SUCCINATE 125 MG: 125 INJECTION, POWDER, FOR SOLUTION INTRAMUSCULAR; INTRAVENOUS at 00:28

## 2017-06-09 RX ADMIN — ACETAMINOPHEN 975 MG: 325 TABLET, FILM COATED ORAL at 04:09

## 2017-06-09 RX ADMIN — CHLORHEXIDINE GLUCONATE 15 ML: 1.2 RINSE ORAL at 09:47

## 2017-06-09 RX ADMIN — CEFAZOLIN SODIUM 1 G: 1 INJECTION, POWDER, FOR SOLUTION INTRAMUSCULAR; INTRAVENOUS at 00:28

## 2017-06-09 RX ADMIN — SODIUM CHLORIDE, POTASSIUM CHLORIDE, SODIUM LACTATE AND CALCIUM CHLORIDE: 600; 310; 30; 20 INJECTION, SOLUTION INTRAVENOUS at 06:24

## 2017-06-09 NOTE — DISCHARGE INSTRUCTIONS
Discharge Instructions following Jaw Surgery  [facility]    Diet:    Follow instructions per the dietician.  Activities:    No contact sports.    No running.    No weight lifting.    If swimming, no head under water.    Solitary, quiet exercise is okay.    No vigorous exercise or swimming should be allowed because of the difficulty in breathing and the strain on your fixation wires (if wired).  To facilitated breathing, a decongestant stray (ex. Afrin Nasal Spray) should be bought at a pharmacy and carried with you for cases of nasal congestion.  This should be used SPARINGLY.  Bathing/Incision Care    It is important to practice good oral hygiene.  This is VERY important to stop the possibility of rapid decay of the teeth and infection.    Post-operative day 2:  o Brush your teeth 6-8 times a day.  A small, child-sized, soft toothbrush can be used on the outside of teeth to keep the wire and teeth free of debris.  o Don t use Water-Pik until you have checked with your doctor.  o Make sure hooks and power tubes on braces are clean.  o Drink clear liquids after meals and check inside your mouth with your tongue for any debris.  o Do several warm salt-rinses daily (better than mouth rinses that include alcohol in their contents).  o Use smaller amounts of toothpaste.  What to expect:    Swelling following this type of surgery is completely normal.  Generally swelling increases for about 48-72 hours after surgery then begins to decrease gradually.  One half of the swelling will be gone in 7-10 days; however, it is normal for a small amount of swelling to persist for 4-6 weeks.    Bowel habits may change during your fixation period.  Following surgery, it is common to not have a bowel movement for several days.  If this becomes a problem consult your oral surgeon at one of your routine appointments.    Nausea is no cause for alarm.  Remember that even if you should be nauseated  and vomit, everything that is in your stomach has been strained through your teeth.  However, at the first sign of persistent, significant nausea, call your oral surgeon and he/she will prescribe anti-nausea medication for you in a suppository form.    Medications which have been prescribed for you are also important.  If an antibiotic has been prescribed, it is very important to continue this preparation as directed until it has all been taken.  Also, a liquid pain medication can be taken as directly only if needed f or discomfort.  Call your surgeon if you have these signs or symptoms:    First sign of persistent, significant nausea    Fever/chills greater than 101 oF.    Pain not relieved with pain medicine.    With any questions or concerns.    Feel free to call the office should any problems develop.  The doctor who is on-call will be able to assist you.  Should an immediate emergency develop, go to the nearest hospital emergency room.    Take all medications and follow-up as instructed by your surgeon.    If wired, you should carry your wire cutters with you at all times to be used to cut all the vertical wires between your upper and lower teeth in case of emergency.

## 2017-06-09 NOTE — CONSULTS
NUTRITION EDUCATION    REASON FOR ASSESSMENT:  Nutrition education on Jaw Surgery Diet    CURRENT DIET:  Clear Liquid Jaw Surgery Diet    NUTRITION HISTORY:  Deferred    NUTRITION DIAGNOSIS:  Food- and nutrition-related knowledge deficit R/t lack of prior exposure to information AEB recent jaw surgery.    INTERVENTIONS:    Nutrition Prescription:  Recommended adequate calories and protein to promote healing, several small meals per day, and use of high protein oral supplements.    Implementation:    Assessed learning needs, learning preferences, and willingness to learn    Nutrition Education  (Content):  a) Provided handout  What to Eat After Jaw Surgery   b) Described diet progression per guidelines listed in handout  c) Discussed importance of small meals    Medical Food Supplements - recommended use of a high protein nutritional supplement    Anticipate good compliance    Diet Education - refer to Education Flowsheet    Goals:    Patient verbalizes understanding of diet     All of the above goals met during the education session    Follow Up:    Provided RD contact information for future questions    Grace Crowe RD  Pager [phone] (M-F)            [phone] (W/E & Hol)

## 2017-06-09 NOTE — PLAN OF CARE
Problem: Goal Outcome Summary  Goal: Goal Outcome Summary  Outcome: Improving  A&Ox4. VSS on RA. Humidified face tent and ice pack in place. Denies pain. Reported minimal sore throat. Clear liquids tolerated well. Continue to monitor.

## 2017-06-09 NOTE — PROGRESS NOTES
Pt DC to home with his mom and dad.  Parents to drive the pt home.  Pt and his mom stated they understood DC instructions including medication instructions (these were provided by the MD).  VSS.

## 2017-06-09 NOTE — DISCHARGE SUMMARY
HOSPITAL COURSE:  Alexis Bazzi is a 21-year-old gentleman who underwent [place] osteotomy advancement on 2017 under the care of Walter Chaudhari DDS, and Murray Tran DDS, MD.  His surgery went in uncomplicated fashion and his postoperative course has been stable.  He had a slightly elevated blood pressure overnight but that has now fully resolved.  His pain is under control.  His airway is excellent.  He has no active epistaxis and only some dried blood in the nares.  His edema is minimal for the extent of the procedure.  His maxilla is pink and his occlusion is excellent and identical to that in the articulator setup.  His incision is healthy and airway is good.        He has begun to take p.o. liquids and should be able to support himself, but we will have nutritional consult review this as well.  I reviewed his homegoing activities, diet, hygiene, medications, elastics, followup appointment with Alexis and both parents this morning and we were able to answer all of their questions to their satisfaction.  He will be ready for discharge following his nutritional consult and completion of his intravenous antibiotic and steroid protocols.          WALTER CHAUDHARI DDS             D: 2017 07:10   T: 2017 10:11   MT: TW      Name:     ALEXIS BAZZI   MRN:      [id number]        Account:        [id number]   :      1995           Admit Date:                                       Discharge Date: 2017      Document: [id number]

## 2017-06-09 NOTE — PLAN OF CARE
Problem: Goal Outcome Summary  Goal: Goal Outcome Summary  Outcome: No Change  A&O. VSS. Mild pain; declines pain medication besides scheduled tylenol. HOB elevated. Tolerating clear liquids. Jaw bra in place. Humidity in place. Up independently to the bathroom.

## 2018-12-31 ENCOUNTER — MEDICAL CORRESPONDENCE (OUTPATIENT)
Dept: HEALTH INFORMATION MANAGEMENT | Facility: CLINIC | Age: 23
End: 2018-12-31

## 2018-12-31 ENCOUNTER — TRANSFERRED RECORDS (OUTPATIENT)
Dept: HEALTH INFORMATION MANAGEMENT | Facility: CLINIC | Age: 23
End: 2018-12-31

## 2019-01-14 ENCOUNTER — TELEPHONE (OUTPATIENT)
Dept: PLASTIC SURGERY | Facility: CLINIC | Age: 24
End: 2019-01-14

## 2019-01-14 DIAGNOSIS — F64.0 GENDER DYSPHORIA IN ADOLESCENT AND ADULT: Primary | ICD-10-CM

## 2019-01-14 NOTE — TELEPHONE ENCOUNTER
Received Referral from Teddy, Provider Amada Vaughan, for voice therapy.     Left  for patient to call back.     Mack Watson  Trans care coordinator

## 2019-01-15 NOTE — TELEPHONE ENCOUNTER
Pt has appt with Kyle in [facility] voice center on 3/15/19. Pt was also given [facility] speech center phone #. Pt did not need additional referrals or services at this time.

## 2019-02-12 ENCOUNTER — PRE VISIT (OUTPATIENT)
Dept: OTOLARYNGOLOGY | Facility: CLINIC | Age: 24
End: 2019-02-12

## 2019-02-12 NOTE — TELEPHONE ENCOUNTER
FUTURE VISIT INFORMATION      FUTURE VISIT INFORMATION:    Date: 3/15/19    Time: 1:00pm    Location: [facility]  REFERRAL INFORMATION:    Referring provider:  Amada Vaughan NP    Referring providers clinic:  [facility]    Reason for visit/diagnosis  They/Them    RECORDS REQUESTED FROM:       Clinic name Comments Records Status Imaging Status   [facility] Requested recs 2/12

## 2019-03-15 ENCOUNTER — TELEPHONE (OUTPATIENT)
Dept: OTOLARYNGOLOGY | Facility: CLINIC | Age: 24
End: 2019-03-15

## 2019-03-15 ENCOUNTER — OFFICE VISIT (OUTPATIENT)
Dept: OTOLARYNGOLOGY | Facility: CLINIC | Age: 24
End: 2019-03-15
Payer: COMMERCIAL

## 2019-03-15 DIAGNOSIS — R49.9 VOICE AND RESONANCE DISORDER: Primary | ICD-10-CM

## 2019-03-15 DIAGNOSIS — F64.0 GENDER DYSPHORIA IN ADOLESCENT AND ADULT: ICD-10-CM

## 2019-03-15 NOTE — LETTER
"3/15/2019       RE: Robert Bazzi  [address]  [facility] [place] [address]     Dear Colleague,    Thank you for referring your patient, Robert Bazzi, to the [facility]. Please see a copy of my visit note below.     [facility]  Evaluation report    Clinician: Mulugeta Sofia M.M., M.A., CCC/SLP  Referring physician:  Dr. Vaughan  Patient: Robert Bazzi  Date of Visit: 3/15/2019    HISTORY  Chief complaint: Ilan Bazzi is a 23 year old gender nonconforming individual presenting today for evaluation of voice.    Salient history: Ilan Bazzi, began to explore their identity and the fall 2018, though looking back on if they feel there were what they described as \"signs\" before this point which seem obvious in retrospect.  They has been seeing a therapist associated with transitions since November 2018, and was evaluated in order to start HRT, but has elected to postpone for the time being.  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      CURRENT SYMPTOMS INCLUDE    VOICE    Seeking broadened     No voice loss    No vocal fatigue    Most difficult to find their voice when there is increased emotion/intensity    Voice simply doesn't align with who they are    Patient denies significant dyspnea, dysphagia, dysphonia, cough and pain.     OTHER PERTINENT HISTORY    healthy    Past Medical History:   Diagnosis Date     Anxiety      Past Surgical History:   Procedure Laterality Date     ENT SURGERY      wisdom teeth extraction     LE FORT ONE N/A 6/8/2017    Procedure: LE FORT ONE;  LEF FORT ONE OSTEOTOMY;  Surgeon: Murray Tran DDS;  Location: [facility] OR       OBJECTIVE  PATIENT REPORTED " "MEASURES  Speech follow up as discussed with patient:  Dysponia SLP Goals 3/15/2019   How well does your voice align with your identity, where 0 is \"my voice doesn't align at all\", and 10 is \"my voice aligns completely\"? 4   How much does your voice problem bother you? A little bit     Patient Supplied Answers To VHI Questionnaire  Voice Handicap Index (VHI-10) 3/15/2019   My voice makes it difficult for people to hear me 0   People have difficulty understanding me in a noisy room 0   My voice difficulties restrict my personal and social life.  0   I feel left out of conversations because of my voice 2   My voice problem causes me to lose income 0   I feel as though I have to strain to produce voice 1   The clarity of my voice is unpredictable 0   My voice problem upsets me 2   My voice makes me feel handicapped 0   People ask, \"What's wrong with your voice?\" 0   VHI-10 5         PERCEPTUAL EVALUATION (CPT 13420)  POSTURE / TENSION:     jaw    neck    BREATHING:     appears within normal limits and adequate     VOICE:    Roughness: Mild Intermittent    Breathiness: Mild Consistent    Strain: Minimal    Loudness    Conversational speech:  WNL    Projected speech:  WNL    Pitch:    Conversational speech:  Mildly lowered compared to gender identity matched peers    Pitch glide: no notable breaks, good access to loft register    Resonance:    Conversational speech: intermittent laryngeal pharyngeal resonance    Singing vs. Speech:  Consistent across contexts    CAPE- Overall Severity:  12/100    COUGH/THROAT CLEARING:    Not observed    ____________________________________________________________________  Laryngeal Function Studies (CPT 52148)  Acoustic measures:  Fundamental frequency Metrics     /a/ mean F0 = 215 Hz (SD = 0.85 Hz)     /i/ mean F0 = 223 Hz (SD = 0.78 Hz)     [place] Mean f0 = 161 Hz (SD 29.05 Hz)     Glide:         Min F0 = 112 Hz        Max F0 = 364 Hz        Range = 252 Hz        Notes = Max " "F0 not representative of perceptual pitch during ascending glides subsequently    Cepstral Measures     CPPS /a/ = 25.34 dB     CPPS /i/ = 28.38 dB     CPPS \"all voiced\" = 21.71 dB     AVQI (v.3.01) = 2.25    Additional Measures     Harmonic to Noise Ratio /a/ = 30.43 dB     Harmonic to Noise Ratio: [place] passage = 14.07 dB     Jitter (local) /a/ = 0.206 %     Shimmer (local) /a/ = 1.025 %    Aerodynamic Measures     Protocol / Parameter Result Normative Mean (SD)   Vital Capacity     Expiratory Volume 4.8 Liters 3.53 (1.36) Liters   Comfortable Sustained Phonation     Mean SPL 76.67 dB 74.72 (4.81) dB   Mean Pitch 157.89 Hz 104.29 (13.23) Hz   Peak Expiratory Airflow 0.333 Lit/Sec 0.2 (0.11) Lit/Sec   Mean Expiratory Airflow 0.242 Lit/Sec 0.13 (0.08) Lit/Sec   Voicing Efficiency     Peak Air Pressure 4.25 cm H2O 7.55 (2.24) cm H2O   Mean Peak Air Pressure 3.74 cm H2O 6.08 (1.65) cm H2O   Peak Expiratory Airflow 0.427 Lit/Sec 0.42 (0.38) Lit/Sec   Mean Airflow During Voicing 0.301 Lit/Sec 0.12 (0.05) Lit/Sec   Running Speech: All Voiced Stimulus     Mean SPL 68.89 dB    Mean Pitch 174.98 Hz    Peak Expiratory Airflow 0.655 Lit/Sec    Mean Expiratory Airflow 0.185 Lit/Sec    Mean Airflow During Voicing 0.154 Lit/Sec    Running Speech: [place]     Mean SPL 65.64 dB    SPL Range 46.59 dB    Mean Pitch 167.86 Hz    Pitch Range 235.44 Hz    Peak Expiratory Airflow 0.903 Lit/Sec    Mean Expiratory Airflow 0.139 Lit/Sec    Expiratory Volume 1.47 Liters    Mean Airflow During Voicing 0.119 Lit/Sec    Peak Inspiratory Airflow -2.375 Lit/Sec    Inspiratory Volume -0.67 Liters    ____________________________________________________________________    ASSESSMENT / PLAN  IMPRESSIONS: Ilan (legal: Robert) Jluis is presenting today with R 49.9 (voice and resonance disorder) in the context of F 64.0 (gender dysphoria).  Laryngeal function studies demonstrate mildly increased trans-glottal airflow during sustained " phonation with a significant relative decrease in airflow continuity during extemporaneously speech.  The patient demonstrates good ability to modulate pitch, but average pitch tends to decline over successive statements toward habitual pitch range, and this is notably below desired gender ambiguous frequency range.  Overall however the patient demonstrates excellent awareness of patterns of voice, which is an excellent prognostic indicator for rapid ability to make progress.    RECOMMENDATIONS:     A course of speech therapy is recommended to allow the patient to achieve a healthy sustainable voice quality so that they are able to meet their personal and professional vocal demands and manner which is consistent with their identity.    Ilan Bazzi demonstrates a Good prognosis for improvement given adherence to therapeutic recommendations.     Positive indicators: positive response to therapy probes diagnosis is known to respond to treatment high level of comittment good awareness of patterns of use    Negative indicators: None    DURATION / FREQUENCY: 6 appointments with 2-4 weeks between each and 2 monthly one-hour follow-up sessions    GOALS:  Patient goal:   1. To understand the problem and fix it as much as possible  2. To achieve a voice congruent with their identity    Short-term goal(s): Within the first 4 sessions,  Ialn:  1. will demonstrate assigned laryngeal massage techniques with 80% accuracy or better with no clinician support  2. will be able to independently list key factors in maintenance of good vocal hygiene with 80% accuracy, and report on their use outside the therapy room.  3. will demonstrate semi-occluded vocal tract (SOVT) exercises with at least 80% accuracy with no clinician support  4. will accurately identify target vs. habitual voice quality during therapy tasks in 4 out of 5 trials with no clinician support  5. will demonstrate the ability to alternate between target and habitual  "voice quality given clinician cue 75% of the time during therapy tasks    Long-term goal(s): In 6 months,  Ilan will:  1. Report a week of typical activities in which they are able to maintain target voice quality 80% of the time    This treatment plan was developed with the patient who agreed with the recommendations.    _______________________________________________________________________  THERAPY NOTE (CPT 88911)  Date of Service: 3/15/2019    SUBJECTIVE / OBJECTIVE:  Please refer to my evaluation report from today's encounter for full details regarding subjective data, patient reported measures, and diagnostic findings.    THERAPEUTIC ACTIVITIES    Counseling and Education    Asked many questions about the nature of Ilan They/Them/She/her Jluis's symptoms, and I answered all of these thoroughly.    The individual nature of our therapeutic goals were discussed at length, and it was emphasized that this type of work would be a collaborative process in which I would rely on the patient's input to guide daily therapeutic targets.      Exercises to promote articulatory characteristics more in line with patient gender identity    Sounds which correlates strongly to gender dysmorphic features targeted    Alveolar consonants    Negative [employer] alternating \"dark\" versus \"light\" productions    Excellent accuracy with /t/ phoneme    Good accuracy with /s/ phoneme and minimal clinician support    Minimal to moderate clinician support required with /l/ sounds    Chant phonation was utilized intermittently to promote consistent voicing throughout    Lip retraction versus rounding was targeted and alternation to promote patient awareness of its effect on formant frequencies    Patient preferred a middle ground reduction with slightly increased range of motion of articulation    Tendency noted to allow pitch to drop to habitual range on final sounds and syllables    Patient was able to recognize this; however, " it was not targeted directly today      Concepts of an optimal regimen for practice were instructed.  o Ilan Bazzi should use an interval schedule of practice, with brief periods of practice frequently throughout each day  o [place] concepts of volitional practice to facilitate motor learning.    I provided handouts of today's therapeutic activities to facilitate practice.    ASSESSMENT/PLAN  PROGRESS TOWARD LONG TERM GOALS:   Minimal at this point, as this is first session, but good learning today    IMPRESSIONS:  Ilan Bazzi is presenting today with R 49.9 (voice and resonance disorder) in the context of F 64.0 (gender dysphoria).  Excellent work within today's session.  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  It was emphasized that despite this aptitude it would take time for motor patterns to feel habituated and patient stated they understood.    PLAN: I will see Ilan in approximately 8 weeks, or sooner as schedule permits, at which time we will target nonverbal vocalizations.     TOTAL SERVICE TIME: 120 minutes  EVALUATION OF VOICE AND RESONANCE (29542)  TREATMENT (29322)  LARYNGEAL FUNCTION STUDIES (47474)  NO CHARGE FACILITY FEE (88741)    *this report was created in part through the use of computerized dictation software, and though reviewed following completion, some typographic errors may persist.  If there is confusion regarding any of this notes contents, please contact me for clarification.*    Kyle Sofia, SLP

## 2019-03-15 NOTE — PROGRESS NOTES
" [facility]  Evaluation report    Clinician: Mulugeta Sofia M.M., M.A., CCC/SLP  Referring physician:  Dr. Vaughan  Patient: Robert Bazzi  Date of Visit: 3/15/2019    HISTORY  Chief complaint: Ilan Bazzi is a 23 year old gender nonconforming individual presenting today for evaluation of voice.    Salient history: Ilan Bazzi, began to explore their identity and the fall 2018, though looking back on if they feel there were what they described as \"signs\" before this point which seem obvious in retrospect.  They has been seeing a therapist associated with transitions since November 2018, and was evaluated in order to start HRT, but has elected to postpone for the time being.  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      CURRENT SYMPTOMS INCLUDE    VOICE    Seeking broadened     No voice loss    No vocal fatigue    Most difficult to find their voice when there is increased emotion/intensity    Voice simply doesn't align with who they are    Patient denies significant dyspnea, dysphagia, dysphonia, cough and pain.     OTHER PERTINENT HISTORY    healthy    Past Medical History:   Diagnosis Date     Anxiety      Past Surgical History:   Procedure Laterality Date     ENT SURGERY      wisdom teeth extraction     LE FORT ONE N/A 6/8/2017    Procedure: LE FORT ONE;  LEF FORT ONE OSTEOTOMY;  Surgeon: Murray Tran DDS;  Location:  OR       OBJECTIVE  PATIENT REPORTED MEASURES  Speech follow up as discussed with patient:  Dysponia SLP Goals 3/15/2019   How well does your voice align with your identity, where 0 is \"my voice doesn't align at all\", and 10 is \"my voice aligns completely\"? 4   How much does your voice problem bother you? A little " "bit     Patient Supplied Answers To VHI Questionnaire  Voice Handicap Index (VHI-10) 3/15/2019   My voice makes it difficult for people to hear me 0   People have difficulty understanding me in a noisy room 0   My voice difficulties restrict my personal and social life.  0   I feel left out of conversations because of my voice 2   My voice problem causes me to lose income 0   I feel as though I have to strain to produce voice 1   The clarity of my voice is unpredictable 0   My voice problem upsets me 2   My voice makes me feel handicapped 0   People ask, \"What's wrong with your voice?\" 0   VHI-10 5         PERCEPTUAL EVALUATION (CPT 51247)  POSTURE / TENSION:     jaw    neck    BREATHING:     appears within normal limits and adequate     VOICE:    Roughness: Mild Intermittent    Breathiness: Mild Consistent    Strain: Minimal    Loudness    Conversational speech:  WNL    Projected speech:  WNL    Pitch:    Conversational speech:  Mildly lowered compared to gender identity matched peers    Pitch glide: no notable breaks, good access to loft register    Resonance:    Conversational speech: intermittent laryngeal pharyngeal resonance    Singing vs. Speech:  Consistent across contexts    CAPE- Overall Severity:  12/100    COUGH/THROAT CLEARING:    Not observed    ____________________________________________________________________  Laryngeal Function Studies (CPT 94691)  Acoustic measures:  Fundamental frequency Metrics     /a/ mean F0 = 215 Hz (SD = 0.85 Hz)     /i/ mean F0 = 223 Hz (SD = 0.78 Hz)     [place] Mean f0 = 161 Hz (SD 29.05 Hz)     Glide:         Min F0 = 112 Hz        Max F0 = 364 Hz        Range = 252 Hz        Notes = Max F0 not representative of perceptual pitch during ascending glides subsequently    Cepstral Measures     CPPS /a/ = 25.34 dB     CPPS /i/ = 28.38 dB     CPPS \"all voiced\" = 21.71 dB     AVQI (v.3.01) = 2.25    Additional Measures     Harmonic to Noise Ratio /a/ = 30.43 dB     " Harmonic to Noise Ratio: [place] = 14.07 dB     Jitter (local) /a/ = 0.206 %     Shimmer (local) /a/ = 1.025 %    Aerodynamic Measures     Protocol / Parameter Result Normative Mean (SD)   Vital Capacity     Expiratory Volume 4.8 Liters 3.53 (1.36) Liters   Comfortable Sustained Phonation     Mean SPL 76.67 dB 74.72 (4.81) dB   Mean Pitch 157.89 Hz 104.29 (13.23) Hz   Peak Expiratory Airflow 0.333 Lit/Sec 0.2 (0.11) Lit/Sec   Mean Expiratory Airflow 0.242 Lit/Sec 0.13 (0.08) Lit/Sec   Voicing Efficiency     Peak Air Pressure 4.25 cm H2O 7.55 (2.24) cm H2O   Mean Peak Air Pressure 3.74 cm H2O 6.08 (1.65) cm H2O   Peak Expiratory Airflow 0.427 Lit/Sec 0.42 (0.38) Lit/Sec   Mean Airflow During Voicing 0.301 Lit/Sec 0.12 (0.05) Lit/Sec   Running Speech: All Voiced Stimulus     Mean SPL 68.89 dB    Mean Pitch 174.98 Hz    Peak Expiratory Airflow 0.655 Lit/Sec    Mean Expiratory Airflow 0.185 Lit/Sec    Mean Airflow During Voicing 0.154 Lit/Sec    Running Speech: [place]     Mean SPL 65.64 dB    SPL Range 46.59 dB    Mean Pitch 167.86 Hz    Pitch Range 235.44 Hz    Peak Expiratory Airflow 0.903 Lit/Sec    Mean Expiratory Airflow 0.139 Lit/Sec    Expiratory Volume 1.47 Liters    Mean Airflow During Voicing 0.119 Lit/Sec    Peak Inspiratory Airflow -2.375 Lit/Sec    Inspiratory Volume -0.67 Liters    ____________________________________________________________________    ASSESSMENT / PLAN  IMPRESSIONS: Ilan (legal: Jhoana Bazzi is presenting today with R 49.9 (voice and resonance disorder) in the context of F 64.0 (gender dysphoria).  Laryngeal function studies demonstrate mildly increased trans-glottal airflow during sustained phonation with a significant relative decrease in airflow continuity during extemporaneously speech.  The patient demonstrates good ability to modulate pitch, but average pitch tends to decline over successive statements toward habitual pitch range, and this is notably below  desired gender ambiguous frequency range.  Overall however the patient demonstrates excellent awareness of patterns of voice, which is an excellent prognostic indicator for rapid ability to make progress.    RECOMMENDATIONS:     A course of speech therapy is recommended to allow the patient to achieve a healthy sustainable voice quality so that they are able to meet their personal and professional vocal demands and manner which is consistent with their identity.    Ilan Bazzi demonstrates a Good prognosis for improvement given adherence to therapeutic recommendations.     Positive indicators: positive response to therapy probes diagnosis is known to respond to treatment high level of comittment good awareness of patterns of use    Negative indicators: None    DURATION / FREQUENCY: 6 appointments with 2-4 weeks between each and 2 monthly one-hour follow-up sessions    GOALS:  Patient goal:   1. To understand the problem and fix it as much as possible  2. To achieve a voice congruent with their identity    Short-term goal(s): Within the first 4 sessions,  Ilan:  1. will demonstrate assigned laryngeal massage techniques with 80% accuracy or better with no clinician support  2. will be able to independently list key factors in maintenance of good vocal hygiene with 80% accuracy, and report on their use outside the therapy room.  3. will demonstrate semi-occluded vocal tract (SOVT) exercises with at least 80% accuracy with no clinician support  4. will accurately identify target vs. habitual voice quality during therapy tasks in 4 out of 5 trials with no clinician support  5. will demonstrate the ability to alternate between target and habitual voice quality given clinician cue 75% of the time during therapy tasks    Long-term goal(s): In 6 months,  Ilan will:  1. Report a week of typical activities in which they are able to maintain target voice quality 80% of the time    This treatment plan was developed with the  "patient who agreed with the recommendations.    _______________________________________________________________________  THERAPY NOTE (CPT 71786)  Date of Service: 3/15/2019    SUBJECTIVE / OBJECTIVE:  Please refer to my evaluation report from today's encounter for full details regarding subjective data, patient reported measures, and diagnostic findings.    THERAPEUTIC ACTIVITIES    Counseling and Education    Asked many questions about the nature of Ilan They/Them/She/her Jluis's symptoms, and I answered all of these thoroughly.    The individual nature of our therapeutic goals were discussed at length, and it was emphasized that this type of work would be a collaborative process in which I would rely on the patient's input to guide daily therapeutic targets.      Exercises to promote articulatory characteristics more in line with patient gender identity    Sounds which correlates strongly to gender dysmorphic features targeted    Alveolar consonants    Negative practice [employer] alternating \"dark\" versus \"light\" productions    Excellent accuracy with /t/ phoneme    Good accuracy with /s/ phoneme and minimal clinician support    Minimal to moderate clinician support required with /l/ sounds    Chant phonation was utilized intermittently to promote consistent voicing throughout    Lip retraction versus rounding was targeted and alternation to promote patient awareness of its effect on formant frequencies    Patient preferred a middle ground reduction with slightly increased range of motion of articulation    Tendency noted to allow pitch to drop to habitual range on final sounds and syllables    Patient was able to recognize this; however, it was not targeted directly today      Concepts of an optimal regimen for practice were instructed.  o Ilan Bazzi should use an interval schedule of practice, with brief periods of practice frequently throughout each day  o [place] concepts of volitional practice to " facilitate motor learning.    I provided handouts of today's therapeutic activities to facilitate practice.    ASSESSMENT/PLAN  PROGRESS TOWARD LONG TERM GOALS:   Minimal at this point, as this is first session, but good learning today    IMPRESSIONS:  Ilan Bazzi is presenting today with R 49.9 (voice and resonance disorder) in the context of F 64.0 (gender dysphoria).  Excellent work within today's session.  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  It was emphasized that despite this aptitude it would take time for motor patterns to feel habituated and patient stated they understood.    PLAN: I will see Ilan in approximately 8 weeks, or sooner as schedule permits, at which time we will target nonverbal vocalizations.     TOTAL SERVICE TIME: 120 minutes  EVALUATION OF VOICE AND RESONANCE (39912)  TREATMENT (97081)  LARYNGEAL FUNCTION STUDIES (10797)  NO CHARGE FACILITY FEE (92820)    Mulugeta Sofia M.M., M.A., CCC-SLP  Speech-Language Pathologist  Certificate of Vocology  [phone]    *this report was created in part through the use of computerized dictation software, and though reviewed following completion, some typographic errors may persist.  If there is confusion regarding any of this notes contents, please contact me for clarification.*

## 2019-05-03 ENCOUNTER — OFFICE VISIT (OUTPATIENT)
Dept: OTOLARYNGOLOGY | Facility: CLINIC | Age: 24
End: 2019-05-03
Payer: COMMERCIAL

## 2019-05-03 DIAGNOSIS — R49.9 VOICE AND RESONANCE DISORDER: Primary | ICD-10-CM

## 2019-05-03 DIAGNOSIS — F64.0 GENDER DYSPHORIA IN ADOLESCENT AND ADULT: ICD-10-CM

## 2019-05-03 NOTE — PATIENT INSTRUCTIONS
Areas of adjustment for voice  How to practice:   1.  something to read   2. Pick one of the areas below to focus on  3. Alternate between the extremes for that specific feature, exaggerating the differences  4. Find what you like from this change and then allow your voice to settle there  Pitch  Resonance    Broad vs. Narrow - Pharyngeal space and Laryngeal height  o Yawn vs. swallow  o Quick inhale for happy reunion    Hyper vs. Hypo nasality -     Forward vs. back   o /m/ vs. glottal green  Suprasegmental features of language  Articulation  o Lip rounding vs. spreading  Inflection / Prosody  o Flat affect vs. kindergarden teacher  Flow / legato vs. staccato

## 2019-05-03 NOTE — LETTER
"5/3/2019       RE: Robert Bazzi  [address]  [place] [facility] [address]     Dear Colleague,    Thank you for referring your patient, Robert Bazzi, to the [facility] at [facility]. Please see a copy of my visit note below.    [facility]  THERAPY NOTE (CPT 21496)    Patient: Robert Bazzi  Date of Service: 5/3/2019  Referring physician: Dr. 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  Laryngeal function studies demonstrate mildly increased trans-glottal airflow during sustained phonation with a significant relative decrease in airflow continuity during extemporaneously speech.  The patient demonstrates good ability to modulate pitch, but average pitch tends to decline over successive statements toward habitual pitch range, and this is notably below desired gender ambiguous frequency range.  Overall however the patient demonstrates excellent awareness of patterns of voice, which is an excellent prognostic indicator for rapid ability to make progress.\"    SUBJECTIVE:  Since the patient's last session, they report the following:     Overall symptoms are somewhat better    Successes: achieving a pitch range, using a pitch analyzer    Hurdles:  Needs to practice more frequently with the articulation piece of the puzzle    OBJECTIVE:  PATIENT REPORTED MEASURES:  Patient Supplied Answers To SLP QOL Questionnaire  Therapy Quality of Life 4/30/2019   Since my l ast session, I used the speech therapy exercises and strategies as recommended by my speech pathologist. Agree   I feel that using my therapy techniques has become a habit Disagree   I feel confident in my ability to manage my current and future symptoms. Agree   Since my last session I feel my symptoms have --------. Improved   Overall, since starting therapy I feel my symptoms " are --------. Better   Overall, how much better? Somewhat better     THERAPEUTIC ACTIVITIES    Counseling and education    Discussions of the various aspects of language, and physiology which can be altered to achieve healthy voice quality of a variety of gender presentations    Exercises to promote awareness and ability to access resonance consistent with the patient's gender identity    Patient was asked to demonstrate voice quality which they perceived as less desirable (described as juvenile) and more desirable (perceived as mature)    Increased inflection as well as broadened resonance, and mildly lower pitch were noted with the desired voice quality    Negative practice was used alternating between broadened and narrowed resonance    Patient was able to make this alternation with minimal clinician support    They consistently more satisfied with a voice quality featuring broadened resonance    Additional features of speech were targeted to a lesser degree including:     Pitch and inflection    Hypernasality versus hyponasality    Laryngeal pharyngeal versus anterior resonance    Lip rounding versus lip spreading in articulation    Legato versus staccato productions    Patient demonstrated good awareness of patterns of use with these tasks and reported that clinician observations were very helpful for highlighting the distinctions      A regimen for home practice was instructed.    I provided handouts of today's therapeutic activities to facilitate practice.    ASSESSMENT/PLAN  PROGRESS TOWARD LONG TERM GOALS:   Adequate progress; too early for objective measures    IMPRESSIONS:  Ilan Bazzi is presenting today with R 49.9 (voice and resonance disorder) in the context of F 64.0 (gender dysphoria).     They feel that there voice has improved, and that they are consistently able to access a pitch in their target range.  Negative practice was used extensively today to promote awareness of various alterations that  can be made to voice quality through resonance, pitch, and other suprasegmental features of language.  They feel confident in their ability to use these techniques to find a voice which feels congruent to them.    PLAN: I will see  Jluis in 5 weeks, at which point we will continue to advance focus on suprasegmental features of language, extending to nonverbal voice tasks.   For practice goals see AVS.     TOTAL SERVICE TIME: 60 minutes  TREATMENT (05763): 60 minutes  NO CHARGE FACILITY FEE (30244)    Mulugeta Sofia M.M., M.A., CCC-SLP  Speech-Language Pathologist  Certificate of Vocology  [phone]      Again, thank you for allowing me to participate in the care of your patient.      Sincerely,    Kyle Sofia, SLP

## 2019-05-03 NOTE — PROGRESS NOTES
"[facility]  THERAPY NOTE (CPT 82144)    Patient: Robert Bazzi  Date of Service: 5/3/2019  Referring physician: Dr. 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  Laryngeal function studies demonstrate mildly increased trans-glottal airflow during sustained phonation with a significant relative decrease in airflow continuity during extemporaneously speech.  The patient demonstrates good ability to modulate pitch, but average pitch tends to decline over successive statements toward habitual pitch range, and this is notably below desired gender ambiguous frequency range.  Overall however the patient demonstrates excellent awareness of patterns of voice, which is an excellent prognostic indicator for rapid ability to make progress.\"    SUBJECTIVE:  Since the patient's last session, they report the following:     Overall symptoms are somewhat better    Successes: achieving a pitch range, using a pitch analyzer    Hurdles:  Needs to practice more frequently with the articulation piece of the puzzle    OBJECTIVE:  PATIENT REPORTED MEASURES:  Patient Supplied Answers To SLP QOL Questionnaire  Therapy Quality of Life 4/30/2019   Since my l ast session, I used the speech therapy exercises and strategies as recommended by my speech pathologist. Agree   I feel that using my therapy techniques has become a habit Disagree   I feel confident in my ability to manage my current and future symptoms. Agree   Since my last session I feel my symptoms have --------. Improved   Overall, since starting therapy I feel my symptoms are --------. Better   Overall, how much better? Somewhat better     THERAPEUTIC ACTIVITIES    Counseling and education    Discussions of the various aspects of language, and physiology which can be altered to achieve healthy voice quality of a variety of gender " presentations    Exercises to promote awareness and ability to access resonance consistent with the patient's gender identity    Patient was asked to demonstrate voice quality which they perceived as less desirable (described as juvenile) and more desirable (perceived as mature)    Increased inflection as well as broadened resonance, and mildly lower pitch were noted with the desired voice quality    Negative practice was used alternating between broadened and narrowed resonance    Patient was able to make this alternation with minimal clinician support    They consistently more satisfied with a voice quality featuring broadened resonance    Additional features of speech were targeted to a lesser degree including:     Pitch and inflection    Hypernasality versus hyponasality    Laryngeal pharyngeal versus anterior resonance    Lip rounding versus lip spreading in articulation    Legato versus staccato productions    Patient demonstrated good awareness of patterns of use with these tasks and reported that clinician observations were very helpful for highlighting the distinctions      A regimen for home practice was instructed.    I provided handouts of today's therapeutic activities to facilitate practice.    ASSESSMENT/PLAN  PROGRESS TOWARD LONG TERM GOALS:   Adequate progress; too early for objective measures    IMPRESSIONS:  Ilan Bazzi is presenting today with R 49.9 (voice and resonance disorder) in the context of F 64.0 (gender dysphoria).    They feel that there voice has improved, and that they are consistently able to access a pitch in their target range.  Negative practice was used extensively today to promote awareness of various alterations that can be made to voice quality through resonance, pitch, and other suprasegmental features of language.  They feel confident in their ability to use these techniques to find a voice which feels congruent to them.    PLAN: I will see  Jluis in 5 weeks, at which  point we will continue to advance focus on suprasegmental features of language, extending to nonverbal voice tasks.   For practice goals see AVS.     TOTAL SERVICE TIME: 60 minutes  TREATMENT (15643): 60 minutes  NO CHARGE FACILITY FEE (62686)    Mulugeta Sofia M.M., M.A., CCC-SLP  Speech-Language Pathologist  Certificate of Vocology  [phone]

## 2019-06-11 ENCOUNTER — OFFICE VISIT (OUTPATIENT)
Dept: OTOLARYNGOLOGY | Facility: CLINIC | Age: 24
End: 2019-06-11
Payer: COMMERCIAL

## 2019-06-11 DIAGNOSIS — F64.0 GENDER DYSPHORIA IN ADOLESCENT AND ADULT: ICD-10-CM

## 2019-06-11 DIAGNOSIS — R49.9 VOICE AND RESONANCE DISORDER: Primary | ICD-10-CM

## 2019-06-11 NOTE — PROGRESS NOTES
"[facility] VOICE CLINIC  THERAPY NOTE (CPT 04544)    Patient: Ilan Bazzi  Date of Service: 6/11/2019  Referring physician: Dr. 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  Laryngeal function studies demonstrate mildly increased trans-glottal airflow during sustained phonation with a significant relative decrease in airflow continuity during extemporaneously speech.  The patient demonstrates good ability to modulate pitch, but average pitch tends to decline over successive statements toward habitual pitch range, and this is notably below desired gender ambiguous frequency range.  Overall however the patient demonstrates excellent awareness of patterns of voice, which is an excellent prognostic indicator for rapid ability to make progress.\"    SUBJECTIVE:  Since the patient's last session, they report the following:     Overall symptoms are a good deal better    Feels that they notice trends in the right direction, though the specifics are hard to put into words    Voice will gravitate back toward old habitual voice quality with longer duration conversations, or interacting with people they don't know    They find that short 1 word responses frequently default to habitual voice quality    OBJECTIVE:  PATIENT REPORTED MEASURES:  Patient Supplied Answers To SLP QOL Questionnaire  Therapy Quality of Life 6/9/2019   Since my l ast session, I used the speech therapy exercises and strategies as recommended by my speech pathologist. Agree   I feel that using my therapy techniques has become a habit Agree   I feel confident in my ability to manage my current and future symptoms. Agree   Since my last session I feel my symptoms have --------. Improved   Overall, since starting therapy I feel my symptoms are --------. Better   Overall, how much better? A good deal better     Patient " "Specific Goal Metrics:  Dysponia SLP Goals 3/15/2019 6/11/2019   How well does your voice align with your identity, where 0 is \"my voice doesn't align at all\", and 10 is \"my voice aligns completely\"? 4 8   How much does your voice problem bother you? A little bit A little bit     THERAPEUTIC ACTIVITIES    Exercises to promote maintenance of target voice quality across extended voice tasks    Maintenance of optimal voice quality for automatic responses (right, yeah, okay, etc.)    A list of frequently used every day phrases was developed with the patient's input    Negative practice was incorporated targeting habitual versus target versions of these phrases    Good accuracy with minimal clinician support    Based on patient report quiet dynamic level was targeted    Decreased variation in inflection was notd with quiet speech    The expectation of increased effort with especially quiets dynamic level was emphasized    Projected speech    Patient reported feeling as if when raising their voice he defaults automatically to more habitual pitch and resonance    Given that pitch is already elevated with their projected speech narrowed/focused resonance was targeted    Patient did not feel that this was reflective of their desired voice quality    Cues to utilize more broadened inflection were most facilitating in this context    Late in the session patient mentioned a desire to focus on vowel and consonant color    Previous exercises on this were briefly reviewed, and they were encouraged to return to these exercises in the short-term and that this would be a likely source of focus for future sessions      A regimen for home practice was instructed.    I provided handouts of today's therapeutic activities to facilitate practice.    ASSESSMENT/PLAN  PROGRESS TOWARD LONG TERM GOALS:   Good progress; please see report above for objective measures    IMPRESSIONS:  Ilan Bazzi is presenting today with R 49.9 (voice and " resonance disorder) in the context of F 64.0 (gender dysphoria).  They are able to access their target voice quality a measurable percentage of the time, but have found specific circumstances, notably extremes of intensity, and automatic responses that have been challenging.  Within today's session they were able to find more consistency of voice quality in these contexts with clinician support.  Overall they are making excellent strides.    PLAN: I will see  Jluis in approximately 1 month at which point we will return to focus on patterns of articulation which are consistent with patient's gender identity.   For practice goals see AVS.     TOTAL SERVICE TIME: 60 minutes  TREATMENT (03456): 60 minutes  NO CHARGE FACILITY FEE (71319)    Mulugeta Sofia M.M., M.A., CCC-SLP  Speech-Language Pathologist  Certificate of Vocology  [phone]

## 2019-06-11 NOTE — LETTER
"6/11/2019      RE: Robert Bazzi  [address]  [facility] [address]       [facility] VOICE CLINIC  THERAPY NOTE (CPT 89466)    Patient: Ilan Bazzi  Date of Service: 6/11/2019  Referring physician: Dr. 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  Laryngeal function studies demonstrate mildly increased trans-glottal airflow during sustained phonation with a significant relative decrease in airflow continuity during extemporaneously speech.  The patient demonstrates good ability to modulate pitch, but average pitch tends to decline over successive statements toward habitual pitch range, and this is notably below desired gender ambiguous frequency range.  Overall however the patient demonstrates excellent awareness of patterns of voice, which is an excellent prognostic indicator for rapid ability to make progress.\"    SUBJECTIVE:  Since the patient's last session, they report the following:     Overall symptoms are a good deal better    Feels that they notice trends in the right direction, though the specifics are hard to put into words    Voice will gravitate back toward old habitual voice quality with longer duration conversations, or interacting with people they don't know    They find that short 1 word responses frequently default to habitual voice quality    OBJECTIVE:  PATIENT REPORTED MEASURES:  Patient Supplied Answers To SLP QOL Questionnaire  Therapy Quality of Life 6/9/2019   Since my l ast session, I used the speech therapy exercises and strategies as recommended by my speech pathologist. Agree   I feel that using my therapy techniques has become a habit Agree   I feel confident in my ability to manage my current and future symptoms. Agree   Since my last session I feel my symptoms have --------. Improved   Overall, since starting therapy I feel my symptoms are " "--------. Better   Overall, how much better? A good deal better     Patient Specific Goal Metrics:  Dysponia SLP Goals 3/15/2019 6/11/2019   How well does your voice align with your identity, where 0 is \"my voice doesn't align at all\", and 10 is \"my voice aligns completely\"? 4 8   How much does your voice problem bother you? A little bit A little bit     THERAPEUTIC ACTIVITIES    Exercises to promote maintenance of target voice quality across extended voice tasks    Maintenance of optimal voice quality for automatic responses (right, yeah, okay, etc.)    A list of frequently used every day phrases was developed with the patient's input    Negative practice was incorporated targeting habitual versus target versions of these phrases    Good accuracy with minimal clinician support    Based on patient report quiet dynamic level was targeted    Decreased variation in inflection was notd with quiet speech    The expectation of increased effort with especially quiets dynamic level was emphasized    Projected speech    Patient reported feeling as if when raising their voice he defaults automatically to more habitual pitch and resonance    Given that pitch is already elevated with their projected speech narrowed/focused resonance was targeted    Patient did not feel that this was reflective of their desired voice quality    Cues to utilize more broadened inflection were most facilitating in this context    Late in the session patient mentioned a desire to focus on vowel and consonant color    Previous exercises on this were briefly reviewed, and they were encouraged to return to these exercises in the short-term and that this would be a likely source of focus for future sessions      A regimen for home practice was instructed.    I provided handouts of today's therapeutic activities to facilitate practice.    ASSESSMENT/PLAN  PROGRESS TOWARD LONG TERM GOALS:   Good progress; please see report above for objective " measures    IMPRESSIONS:  Ilan Bazzi is presenting today with R 49.9 (voice and resonance disorder) in the context of F 64.0 (gender dysphoria).  They are able to access their target voice quality a measurable percentage of the time, but have found specific circumstances, notably extremes of intensity, and automatic responses that have been challenging.  Within today's session they were able to find more consistency of voice quality in these contexts with clinician support.  Overall they are making excellent strides.    PLAN: I will see  Jluis in approximately 1 month at which point we will return to focus on patterns of articulation which are consistent with patient's gender identity.   For practice goals see AVS.     TOTAL SERVICE TIME: 60 minutes  TREATMENT (76305): 60 minutes  NO CHARGE FACILITY FEE (77291)    Mulugeta Sofia M.M., M.A., CCC-SLP  Speech-Language Pathologist  Certificate of Vocology  [phone]

## 2019-07-16 ENCOUNTER — OFFICE VISIT (OUTPATIENT)
Dept: OTOLARYNGOLOGY | Facility: CLINIC | Age: 24
End: 2019-07-16
Payer: COMMERCIAL

## 2019-07-16 DIAGNOSIS — R49.9 VOICE AND RESONANCE DISORDER: Primary | ICD-10-CM

## 2019-07-16 DIAGNOSIS — F64.0 GENDER DYSPHORIA IN ADOLESCENT AND ADULT: ICD-10-CM

## 2019-07-16 NOTE — PROGRESS NOTES
"[facility] VOICE CLINIC  THERAPY NOTE (CPT 52092)    Patient: Rboert Bazzi  Date of Service: 7/16/2019  Referring physician: Dr. 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  Laryngeal function studies demonstrate mildly increased trans-glottal airflow during sustained phonation with a significant relative decrease in airflow continuity during extemporaneously speech.  The patient demonstrates good ability to modulate pitch, but average pitch tends to decline over successive statements toward habitual pitch range, and this is notably below desired gender ambiguous frequency range.  Overall however the patient demonstrates excellent awareness of patterns of voice, which is an excellent prognostic indicator for rapid ability to make progress.\"    SUBJECTIVE:  Since the patient's last session, they report the following:     Overall symptoms are improved, though his feelings regarding progress have been tempered by feedback from close friends    They feel they are able to access a voice quality which represents them a fair portion of the time, but that it requires a level of focus and intention which is challenging to maintain    They also have been experiencing increased hoarseness with voice use    OBJECTIVE:  PATIENT REPORTED MEASURES:  Patient Supplied Answers To SLP QOL Questionnaire  Therapy Quality of Life 7/16/2019   Since my l ast session, I used the speech therapy exercises and strategies as recommended by my speech pathologist. Agree   I feel that using my therapy techniques has become a habit Agree   I feel confident in my ability to manage my current and future symptoms. Agree   Since my last session I feel my symptoms have --------. Stayed the same   Overall, since starting therapy I feel my symptoms are --------. Better   Overall, how much better? Somewhat better " "        THERAPEUTIC ACTIVITIES    Counseling and Education:    Asked many questions about the nature of their symptoms, and I answered all of these thoroughly.    Exercises to promote reduced perilaryngeal muscle tension    Four way neck stretch instructed    Modifications for additional extension instructed    Base of tongue stretch instructed    Proper form for each stretch was emphasized, including:    Maintenance of posture for 10 breaths (~20-30 seconds)    Awareness of tension on inhalation with volitional relaxation into the stretch on exhalation    Avoidance of \"forcing\" a posture, only progressing far enough to feel the stretch    They reported awareness of significant tension during anterior neck stretch    They noted increased sense of muscular relaxation following completion of the stretches    Manual Laryngeal massage was performed in combination with gentle forward resonant sounds    Significant tenderness of the thyrohyoid space with corresponding reduction of space     Thyrohyoid space, base of tongue were targeted with gentle circular massage    Patient was trained to focus on intentional relaxation of jaw and tongue in addition to area of massage during these maneuvers.    Self-massage was instructed and patient was able to demonstrate this with acceptable accuracy  Semi-Occluded Vocal Tract (SOVT) exercises instructed to reduce laryngeal tension, promote vocal fold pliability, and coordinate respiration and phonation    Straw with water resistance was found to be most facilitating     Sustained phonation, and voice vs. voiceless productions used to promote easy voicing and raise awareness of laryngeal tension    Ascending and descending glides utilized to promote vocal fold pliability    Instructed to use these exercises as a warm-up / cooldown, and to re-calibrate the voice throughout the day.    Good accuracy with minimal clinician support    Significant discussion regarding aspects of voice " quality which feels congruent with their identity    The use of reference voices was discussed    Traits which appealed to the patient included more open resonance, and what they described as more gentle sound    They were encouraged to focus more on the extrinsic/outcome aspects of voice use (clear, easy, open, etc), as opposed to the intrinsic components (tongue position, pitch, etc)      A regimen for home practice was instructed.    I provided an audio recording and handouts of today's therapeutic activities to facilitate practice.    ASSESSMENT/PLAN  PROGRESS TOWARD LONG TERM GOALS:   Adequate progress; please see above    IMPRESSIONS:  Ilan Bazzi is presenting today with R 49.9 (voice and resonance disorder) in the context of F 64.0 (gender dysphoria).  They have made significant progress over the past few sessions, but has recognized some instability and shortcomings in the voice quality they have been pursuing.  Additionally increased vocal fatigue, and hoarseness have been noted.  Techniques were instructed to reduce perilaryngeal tension and improve ease of voicing.  We also broadened resonance and allowed pitch to move to a more natural range which felt significantly less taxing to the patient.    PLAN: I will see  Jluis in 4 to 6 weeks as schedule permits at which time we will do new to target habituation of target voice quality.   For practice goals see AVS.       TOTAL SERVICE TIME: 60 minutes  TREATMENT (26590): 60 minutes  NO CHARGE FACILITY FEE (78163)    Mulugeta Sofia M.M., M.A., CCC-SLP  Speech-Language Pathologist  Certificate of Vocology  [phone]

## 2019-07-16 NOTE — LETTER
"7/16/2019      RE: Robert Bazzi  [address]  [facility] [address]       [facility] VOICE CLINIC  THERAPY NOTE (CPT 49878)    Patient: Robert Bazzi  Date of Service: 7/16/2019  Referring physician: Dr. 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  Laryngeal function studies demonstrate mildly increased trans-glottal airflow during sustained phonation with a significant relative decrease in airflow continuity during extemporaneously speech.  The patient demonstrates good ability to modulate pitch, but average pitch tends to decline over successive statements toward habitual pitch range, and this is notably below desired gender ambiguous frequency range.  Overall however the patient demonstrates excellent awareness of patterns of voice, which is an excellent prognostic indicator for rapid ability to make progress.\"    SUBJECTIVE:  Since the patient's last session, they report the following:     Overall symptoms are improved, though his feelings regarding progress have been tempered by feedback from close friends    They feel they are able to access a voice quality which represents them a fair portion of the time, but that it requires a level of focus and intention which is challenging to maintain    They also have been experiencing increased hoarseness with voice use    OBJECTIVE:  PATIENT REPORTED MEASURES:  Patient Supplied Answers To SLP QOL Questionnaire  Therapy Quality of Life 7/16/2019   Since my l ast session, I used the speech therapy exercises and strategies as recommended by my speech pathologist. Agree   I feel that using my therapy techniques has become a habit Agree   I feel confident in my ability to manage my current and future symptoms. Agree   Since my last session I feel my symptoms have --------. Stayed the same   Overall, since starting therapy I feel my " "symptoms are --------. Better   Overall, how much better? Somewhat better         THERAPEUTIC ACTIVITIES    Counseling and Education:    Asked many questions about the nature of their symptoms, and I answered all of these thoroughly.    Exercises to promote reduced perilaryngeal muscle tension    Four way neck stretch instructed    Modifications for additional extension instructed    Base of tongue stretch instructed    Proper form for each stretch was emphasized, including:    Maintenance of posture for 10 breaths (~20-30 seconds)    Awareness of tension on inhalation with volitional relaxation into the stretch on exhalation    Avoidance of \"forcing\" a posture, only progressing far enough to feel the stretch    They reported awareness of significant tension during anterior neck stretch    They noted increased sense of muscular relaxation following completion of the stretches    Manual Laryngeal massage was performed in combination with gentle forward resonant sounds    Significant tenderness of the thyrohyoid space with corresponding reduction of space     Thyrohyoid space, base of tongue were targeted with gentle circular massage    Patient was trained to focus on intentional relaxation of jaw and tongue in addition to area of massage during these maneuvers.    Self-massage was instructed and patient was able to demonstrate this with acceptable accuracy  Semi-Occluded Vocal Tract (SOVT) exercises instructed to reduce laryngeal tension, promote vocal fold pliability, and coordinate respiration and phonation    Straw with water resistance was found to be most facilitating     Sustained phonation, and voice vs. voiceless productions used to promote easy voicing and raise awareness of laryngeal tension    Ascending and descending glides utilized to promote vocal fold pliability    Instructed to use these exercises as a warm-up / cooldown, and to re-calibrate the voice throughout the day.    Good accuracy with minimal " clinician support    Significant discussion regarding aspects of voice quality which feels congruent with their identity    The use of reference voices was discussed    Traits which appealed to the patient included more open resonance, and what they described as more gentle sound    They were encouraged to focus more on the extrinsic/outcome aspects of voice use (clear, easy, open, etc), as opposed to the intrinsic components (tongue position, pitch, etc)      A regimen for home practice was instructed.    I provided an audio recording and handouts of today's therapeutic activities to facilitate practice.    ASSESSMENT/PLAN  PROGRESS TOWARD LONG TERM GOALS:   Adequate progress; please see above    IMPRESSIONS:  Ilan Bazzi is presenting today with R 49.9 (voice and resonance disorder) in the context of F 64.0 (gender dysphoria).  They have made significant progress over the past few sessions, but has recognized some instability and shortcomings in the voice quality they have been pursuing.  Additionally increased vocal fatigue, and hoarseness have been noted.  Techniques were instructed to reduce perilaryngeal tension and improve ease of voicing.  We also broadened resonance and allowed pitch to move to a more natural range which felt significantly less taxing to the patient.    PLAN: I will see  Jluis in 4 to 6 weeks as schedule permits at which time we will do new to target habituation of target voice quality.   For practice goals see AVS.       TOTAL SERVICE TIME: 60 minutes  TREATMENT (26415): 60 minutes  NO CHARGE FACILITY FEE (94408)    Mulugeta Sofia M.M., M.A., CCC-SLP  Speech-Language Pathologist  Certificate of Vocology  [phone]

## 2019-07-16 NOTE — PATIENT INSTRUCTIONS
Voice Exercises!  1. TAKE CARE OF YOUR VOICE  a. Cup and bubbles at least 2 times daily  b. Stretching / massage 2 times daily  2. Explore broadened resonance as a counter to the tension  3. Keep looking into reference voices and as you discover characteristics that you like, add them to the growing list of adjectives for Ilan s voice.

## 2019-07-25 ENCOUNTER — TRANSFERRED RECORDS (OUTPATIENT)
Dept: HEALTH INFORMATION MANAGEMENT | Facility: CLINIC | Age: 24
End: 2019-07-25

## 2019-07-26 ENCOUNTER — PRE VISIT (OUTPATIENT)
Dept: OPHTHALMOLOGY | Facility: CLINIC | Age: 24
End: 2019-07-26

## 2019-07-26 NOTE — TELEPHONE ENCOUNTER
FUTURE VISIT INFORMATION      FUTURE VISIT INFORMATION:    Date: 8/13/19    Time: 2:00pm    Location: [facility]  REFERRAL INFORMATION:    Referring providers clinic:  [facility]    Reason for visit/diagnosis  Chalazion- upper left eyelid    RECORDS REQUESTED FROM:       [facility]- request for recs sent 7/26

## 2019-08-13 ENCOUNTER — OFFICE VISIT (OUTPATIENT)
Dept: OPHTHALMOLOGY | Facility: CLINIC | Age: 24
End: 2019-08-13
Payer: COMMERCIAL

## 2019-08-13 DIAGNOSIS — H00.14 CHALAZION OF LEFT UPPER EYELID: Primary | ICD-10-CM

## 2019-08-13 DIAGNOSIS — H00.15 CHALAZION OF LEFT LOWER EYELID: ICD-10-CM

## 2019-08-13 ASSESSMENT — VISUAL ACUITY
OS_SC+: -1
OS_SC: 20/20
OD_SC+: 1
OD_SC: 20/20
METHOD: SNELLEN - LINEAR

## 2019-08-13 ASSESSMENT — CONF VISUAL FIELD
OS_NORMAL: 1
OD_NORMAL: 1
METHOD: COUNTING FINGERS

## 2019-08-13 ASSESSMENT — TONOMETRY
OS_IOP_MMHG: 22
IOP_METHOD: ICARE
OD_IOP_MMHG: 23

## 2019-08-13 ASSESSMENT — SLIT LAMP EXAM - LIDS: COMMENTS: NORMAL

## 2019-08-13 ASSESSMENT — EXTERNAL EXAM - LEFT EYE: OS_EXAM: NORMAL

## 2019-08-13 ASSESSMENT — EXTERNAL EXAM - RIGHT EYE: OD_EXAM: NORMAL

## 2019-08-13 NOTE — LETTER
2019         RE:  :  MRN: Robert Bazzi  1995  [id number]     Dear Dr. Day,    Thank you for asking me to see your patient, Robert Bazzi, for an oculoplastic   consultation.  My assessment and plan are below.  For further details, please see my attached clinic note.           Chief Complaint(s) and History of Present Illness(es)     Chalazion Evaluation     Laterality: left upper lid and left lower lid    Onset: 2 months ago    Course: stable    Associated symptoms: lid swelling and discharge.  Negative for eye pain    Treatments tried: warm compresses    Response to treatment: no improvement    Pain scale: 0/10              Comments     Pt here for eval of chalazion of left upper and lower eyelid, pt states   it has been present for the last 2 months. Has tried warm compresses with   no relief. No pain.    Victoriano Poon COT 2:06 PM 2019             Assessment & Plan     Robert Bazzi is a 23 year old adult with the following diagnoses:   1. Chalazion of left upper eyelid    2. Chalazion of left lower eyelid         Plan left upper and lower eyelid incision and drainage of chalazion          Again, thank you for allowing me to participate in the care of your patient.      Sincerely,    Chetan Brooke MD  Department of Ophthalmology and Visual Neurosciences  [facility]    CC: Jose Day, OD  [address] [address]  VIA Mail

## 2019-08-13 NOTE — PROGRESS NOTES
Chief Complaint(s) and History of Present Illness(es)     Chalazion Evaluation     Laterality: left upper lid and left lower lid    Onset: 2 months ago    Course: stable    Associated symptoms: lid swelling and discharge.  Negative for eye pain    Treatments tried: warm compresses    Response to treatment: no improvement    Pain scale: 0/10              Comments     Pt here for eval of chalazion of left upper and lower eyelid, pt states   it has been present for the last 2 months. Has tried warm compresses with   no relief. No pain.    Victoriano Poon COT 2:06 PM August 13, 2019             Assessment & Plan     Robert Bazzi is a 23 year old adult with the following diagnoses:   1. Chalazion of left upper eyelid    2. Chalazion of left lower eyelid         Plan left upper and lower eyelid incision and drainage of chalazion           Attending Physician Attestation:  Complete documentation of historical and exam elements from today's encounter can be found in the full encounter summary report (not reduplicated in this progress note).  I personally obtained the chief complaint(s) and history of present illness.  I confirmed and edited as necessary the review of systems, past medical/surgical history, family history, social history, and examination findings as documented by others; and I examined the patient myself.  I personally reviewed the relevant tests, images, and reports as documented above.  I formulated and edited as necessary the assessment and plan and discussed the findings and management plan with the patient and family. - Chetan Brooke MD

## 2019-08-13 NOTE — NURSING NOTE
Chief Complaints and History of Present Illnesses   Patient presents with     Chalazion Evaluation     Chief Complaint(s) and History of Present Illness(es)     Chalazion Evaluation     Laterality: left upper lid and left lower lid    Onset: 2 months ago    Course: stable    Associated symptoms: lid swelling and discharge.  Negative for eye pain    Treatments tried: warm compresses    Response to treatment: no improvement    Pain scale: 0/10              Comments     Pt here for eval of chalazion of left upper and lower eyelid, pt states it has been present for the last 2 months. Has tried warm compresses with no relief. No pain.    Victoriano Poon COT 2:06 PM August 13, 2019

## 2019-08-14 ENCOUNTER — PRE VISIT (OUTPATIENT)
Dept: OPHTHALMOLOGY | Facility: CLINIC | Age: 24
End: 2019-08-14

## 2019-08-16 ENCOUNTER — TELEPHONE (OUTPATIENT)
Dept: OPHTHALMOLOGY | Facility: CLINIC | Age: 24
End: 2019-08-16

## 2019-08-19 ENCOUNTER — PRE VISIT (OUTPATIENT)
Dept: OPHTHALMOLOGY | Facility: CLINIC | Age: 24
End: 2019-08-19

## 2019-08-27 ENCOUNTER — OFFICE VISIT (OUTPATIENT)
Dept: OPHTHALMOLOGY | Facility: CLINIC | Age: 24
End: 2019-08-27
Payer: COMMERCIAL

## 2019-08-27 DIAGNOSIS — H00.19 CHALAZION: Primary | ICD-10-CM

## 2019-08-27 RX ORDER — ERYTHROMYCIN 5 MG/G
OINTMENT OPHTHALMIC ONCE
Status: COMPLETED | OUTPATIENT
Start: 2019-08-27 | End: 2019-08-27

## 2019-08-27 RX ADMIN — ERYTHROMYCIN: 5 OINTMENT OPHTHALMIC at 12:39

## 2019-08-27 NOTE — PROGRESS NOTES
OPERATIVE NOTE: CHALAZION.    PRE-OPERATIVE DIAGNOSIS: Chalazion left upper lid and left lower lid.    POST-OPERATIVE DIAGNOSIS: Chalazion left upper lid and left lower lid.    PROCEDURE PERFORMED: Incision and drainage of chalazion left upper lid and left lower lid.    SURGEON: Chetan Brooke    ASSISTANT: Milan Bailey MD      ANESTHESIA: Local infiltration with 2% lidocaine with epinephrine.    COMPLICATIONS: None    ESTIMATED BLOOD LOSS:  <5mL    HISTORY AND INDICATIONS: Patient presented with an enlarging chalazion in the involved eyelid.  This failed conservative medical management.  After the risks, benefits and alternatives of the proposed procedure were explained, informed consent was obtained.     PROCEDURE: Patient was brought to the minor procedure room and placed supine on the operating table.  Anesthesia was as listed above.  The area was prepped and draped in the typical fashion.  A chalazion clamp was placed over the left upper eyelid and the eyelid everted.  A crutiate incision was made over the chalazion and the lipogranulomatous material removed using the chalazion curette.  Scissors were used to break any septae.  The edges of the cruciate incision were excised using [place] scissors.  Hemostasis was obtained.  The lid was reverted to its normal position. The same was performed on the left lower eyelid. The clamp removed, and erythromycin antibiotic ointment applied.  The patient tolerated the procedure well and left in stable condition with a tube of antibiotic ointment.     I was present for the entire procedure. Chetan Brooke MD

## 2019-09-04 ENCOUNTER — TELEPHONE (OUTPATIENT)
Dept: OTOLARYNGOLOGY | Facility: CLINIC | Age: 24
End: 2019-09-04

## 2019-09-13 ENCOUNTER — OFFICE VISIT (OUTPATIENT)
Dept: OTOLARYNGOLOGY | Facility: CLINIC | Age: 24
End: 2019-09-13
Payer: COMMERCIAL

## 2019-09-13 DIAGNOSIS — F64.0 GENDER DYSPHORIA IN ADOLESCENT AND ADULT: ICD-10-CM

## 2019-09-13 DIAGNOSIS — R49.9 VOICE AND RESONANCE DISORDER: Primary | ICD-10-CM

## 2019-09-13 NOTE — PROGRESS NOTES
"[facility] VOICE CLINIC  THERAPY NOTE (CPT 62835)    Patient: Robert Bazzi  Date of Service: 9/13/2019  Referring physician: Dr. 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  Laryngeal function studies demonstrate mildly increased trans-glottal airflow during sustained phonation with a significant relative decrease in airflow continuity during extemporaneously speech.  The patient demonstrates good ability to modulate pitch, but average pitch tends to decline over successive statements toward habitual pitch range, and this is notably below desired gender ambiguous frequency range.  Overall however the patient demonstrates excellent awareness of patterns of voice, which is an excellent prognostic indicator for rapid ability to make progress.\"    ** Patient was confused regarding the time of her appointment and was incorrectly checked in 45 minutes after the appointment start time. I spoke with the patient briefly, and we will postpone today's visit until her next appointment in late October.  She was encouraged to reach out if she would like to be seen in the interim or schedule additional follow-ups afterward. **    No skilled services were rendered as part of today's appointment so there is no charge.    No charge for today s session; charges will be billed at the completion of the evaluation  NO CHARGE FACILITY FEE (76581)    PRIMARY ICD-10 code:  R49.9 (Voice and resonance disorder)  SECONDARY ICD-10 code:  F64.0 (Gender Dysphoria)     Mulugeta Sofia M.M., M.A., CCC-SLP  Speech-Language Pathologist  Certificate of Vocology  [phone]    "

## 2019-09-13 NOTE — LETTER
"9/13/2019       RE: Robert Bazzi  [address]  [place] [address]     Dear Colleague,    Thank you for referring your patient, Robert Bazzi, to the [facility]. Please see a copy of my visit note below.    [facility] VOICE CLINIC  THERAPY NOTE (CPT 33583)    Patient: Robert Bazzi  Date of Service: 9/13/2019  Referring physician: Dr. 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  Laryngeal function studies demonstrate mildly increased trans-glottal airflow during sustained phonation with a significant relative decrease in airflow continuity during extemporaneously speech.  The patient demonstrates good ability to modulate pitch, but average pitch tends to decline over successive statements toward habitual pitch range, and this is notably below desired gender ambiguous frequency range.  Overall however the patient demonstrates excellent awareness of patterns of voice, which is an excellent prognostic indicator for rapid ability to make progress.\"    ** Patient was confused regarding the time of her appointment and was incorrectly checked in 45 minutes after the appointment start time. I spoke with the patient briefly, and we will postpone today's visit until her next appointment in late October.  She was encouraged to reach out if she would like to be seen in the interim or schedule additional follow-ups afterward. **    No skilled services were rendered as part of today's appointment so there is no charge.    No charge for today s session; charges will be billed at the completion of the evaluation  NO CHARGE FACILITY FEE (53344)    PRIMARY ICD-10 code:  R49.9 (Voice and resonance disorder)  SECONDARY ICD-10 code:  F64.0 (Gender Dysphoria)     Mulugeta Sofia M.M., M.A., CCC-SLP  Speech-Language Pathologist  Certificate " of Vocology  [phone]

## 2020-03-10 ENCOUNTER — HEALTH MAINTENANCE LETTER (OUTPATIENT)
Age: 25
End: 2020-03-10

## 2020-06-23 ENCOUNTER — TELEPHONE (OUTPATIENT)
Dept: PLASTIC SURGERY | Facility: CLINIC | Age: 25
End: 2020-06-23

## 2020-06-23 NOTE — TELEPHONE ENCOUNTER
[facility] Call Center    Phone Message    May a detailed message be left on voicemail: no // unknown    Reason for Call: Appointment Intake    Referring Provider Name: self  Diagnosis and/or Symptoms: Consultation for FFS and hairline advancement surgeries    Action Taken: Message routed to:  [facility] Care / Plastic Surgery    Travel Screening: Not Applicable

## 2020-06-25 ENCOUNTER — TELEPHONE (OUTPATIENT)
Dept: PLASTIC SURGERY | Facility: CLINIC | Age: 25
End: 2020-06-25

## 2020-06-26 ENCOUNTER — TELEPHONE (OUTPATIENT)
Dept: PLASTIC SURGERY | Facility: CLINIC | Age: 25
End: 2020-06-26

## 2020-06-26 DIAGNOSIS — F64.0 GENDER DYSPHORIA IN ADOLESCENT AND ADULT: Primary | ICD-10-CM

## 2020-06-26 NOTE — TELEPHONE ENCOUNTER
[facility]:  Care Coordination Note     SITUATION   Ilan Bazzi is a 24 year old adult who is receiving support for:  Clinic Care Coordination - Initial  .    BACKGROUND     Pt is interested in GCFS    Pt is not a smoker and is not Diabetic    Pt has been on HRT for 14 months    Pt is working with [facility] to  Obtain LOS        ASSESSMENT     Surgery              [facility] [place] Assessment  [facility] Care ([facility] [place] Enrollment: Enrolled  Patient has a therapist: Yes  Name of therapist: Cecy CHEUNG  Therapist's phone number: [facility]  Letter of support #1: Requested  Surgery being considered: Yes(GCFS)          PLAN         Follow-up plan:  Pt to attend scheduled consult  Pt to obtain los and get it to the [facility] [place]       Bassam Puente

## 2020-06-29 NOTE — TELEPHONE ENCOUNTER
FUTURE VISIT INFORMATION      FUTURE VISIT INFORMATION:    Date: 10/16/20    Time: 10:00am    Location: [facility]  REFERRAL INFORMATION:    Referring provider:  self    Referring providers clinic:  n/a    Reason for visit/diagnosis  gcfs    RECORDS REQUESTED FROM:       No recs to collect

## 2020-08-17 ENCOUNTER — MEDICAL CORRESPONDENCE (OUTPATIENT)
Dept: HEALTH INFORMATION MANAGEMENT | Facility: CLINIC | Age: 25
End: 2020-08-17

## 2020-10-15 ENCOUNTER — TELEPHONE (OUTPATIENT)
Dept: PLASTIC SURGERY | Facility: CLINIC | Age: 25
End: 2020-10-15

## 2020-10-15 NOTE — TELEPHONE ENCOUNTER
10/15/2020 10:55 AM    Called patient at the contact number listed on file; no answer. Left voicemail offering to convert tomorrow's virtual appt to in-clinic appt. Provided contact info for patient to call back.    ANGIE DavidsonT

## 2020-10-16 ENCOUNTER — PRE VISIT (OUTPATIENT)
Dept: PLASTIC SURGERY | Facility: CLINIC | Age: 25
End: 2020-10-16

## 2020-10-16 ENCOUNTER — VIRTUAL VISIT (OUTPATIENT)
Dept: PLASTIC SURGERY | Facility: CLINIC | Age: 25
End: 2020-10-16
Attending: PLASTIC SURGERY
Payer: COMMERCIAL

## 2020-10-16 VITALS — BODY MASS INDEX: 24.34 KG/M2 | HEIGHT: 70 IN | WEIGHT: 170 LBS

## 2020-10-16 DIAGNOSIS — F64.0 GENDER DYSPHORIA IN ADOLESCENT AND ADULT: Primary | ICD-10-CM

## 2020-10-16 PROBLEM — F41.9 MILD ANXIETY: Status: ACTIVE | Noted: 2020-10-16

## 2020-10-16 PROCEDURE — 99204 OFFICE O/P NEW MOD 45 MIN: CPT | Mod: GT | Performed by: PLASTIC SURGERY

## 2020-10-16 RX ORDER — DEXTROAMPHETAMINE SACCHARATE, AMPHETAMINE ASPARTATE, DEXTROAMPHETAMINE SULFATE AND AMPHETAMINE SULFATE 2.5; 2.5; 2.5; 2.5 MG/1; MG/1; MG/1; MG/1
10 TABLET ORAL 2 TIMES DAILY PRN
COMMUNITY
End: 2021-04-27

## 2020-10-16 RX ORDER — ESTRADIOL 2 MG/1
7 TABLET ORAL 2 TIMES DAILY
COMMUNITY
Start: 2019-04-09 | End: 2021-05-20

## 2020-10-16 RX ORDER — FINASTERIDE 1 MG/1
1 TABLET, FILM COATED ORAL EVERY EVENING
COMMUNITY
Start: 2019-03-01 | End: 2021-05-20

## 2020-10-16 ASSESSMENT — MIFFLIN-ST. JEOR: SCORE: 1757.36

## 2020-10-16 ASSESSMENT — PAIN SCALES - GENERAL: PAINLEVEL: NO PAIN (0)

## 2020-10-16 NOTE — LETTER
"10/16/2020       RE: Robert Bazzi  [address]  [place] [address]     Dear Colleague,    Thank you for referring your patient, Robert Bazzi, to the [facility] PLASTIC AND RECONSTRUCTIVE SURGERY CLINIC [place] at [facility] Please see a copy of my visit note below.    PLASTIC SURGERY HISTORY AND PHYSICAL    Chief Complaint: Gender dysphoria, requesting facial gender confirmation surgery.     HPI: Patient is a 25 year old trans-person who prefers she/her/hers pronouns, requesting facial gender confirmation surgery. Chosen name is Ilan. Patient has been considering undergoing facial surgery for the past 1 year.  By undergoing facial surgery, the goal is to achieve better congruence between the physical appearance with gender identity.  The patient transitioned 2 years ago.  Reports supportive family and friends.  The patient has been on estrogen/progesterone/finasteride therapy for the past 1.5 years.  Denies smoking. Denies family h/o bleeding disorders. The patient had orthognathic surgery in 2017. Denies history of facial trauma.    Patient has been recipient of transphobic slurs, being called \"trap\" in the past. Has been called deluded, told to get institutionalized.    Her specific areas of concern include her protrusive brow ridge, the shadowing of her eyes, and the forward prominence of her chin.    PMH:   Past Medical History:   Diagnosis Date     Anxiety        PSH:   Past Surgical History:   Procedure Laterality Date     ENT SURGERY       LE FORT ONE N/A 6/8/2017       FH:   Family History   Problem Relation Age of Onset     Macular Degeneration Father      Glaucoma No family hx of         SH:   Social History     Tobacco Use     Smoking status: Never Smoker     Smokeless tobacco: Never Used   Substance Use Topics     Alcohol use: Yes     Drug use: None      Currently living in [facility]. Not working or in school. Graduated from [employer], studying theatre, " "Spring 2020. Living with friends and partner.     MEDS:     Current Outpatient Medications:      amphetamine-dextroamphetamine (ADDERALL) 10 MG tablet, Take 10 mg by mouth 2 times daily as needed, Disp: , Rfl:      estradiol (ESTRACE) 2 MG tablet, Take 7 mg by mouth daily, Disp: , Rfl:      finasteride (PROPECIA) 1 MG tablet, Take 1 mg by mouth daily, Disp: , Rfl:      FLUoxetine (PROZAC) 20 MG capsule, Take 40 mg by mouth daily, Disp: , Rfl:      PROGESTERONE MICRONIZED PO, Take 10 mg by mouth daily, Disp: , Rfl:        ALLERGIES:   No Known Allergies     ROS: Denies chest pain, shortness of breath, diabetes, MI, CVA, DVT, PE, and bleeding disorders.     PHYSICAL EXAMINATION:   Ht 1.778 m (5' 10\")   Wt 77.1 kg (170 lb)   BMI 24.39 kg/m     BMI: Body mass index is 24.39 kg/m .  Limited given virtual visit.  General: No acute distress.  Appears masculine.  FACIAL EXAM  HAIRLINE:  Receded, rounded hairline  FOREHEAD: flattened/concave forehead with prominent fronto-orbital bar, orbital hooding  NOSE: appropriate projection, dorsal hump  LIPS: appropriate cutaneous length, full  JAW: prominent menton and widened mandibular body/angle  NECK: visible thyroid cartilage prominence  FACIAL HAIR: minimal, difficult to completely assess     ASSESSMENT: Gender dysphoria, requesting facial gender confirmation surgery.     PLAN: Patient has provided us with a letter of support.  We will review this.  Once these are obtained, patient is a potential candidate for facial gender confirmation surgery to help address the gender dysphoria.  I explained this procedure in detail today.      Components of the exam that are overtly masculine include bilaterally recessed hairline, high forehead, prominent fronto-orbital / supra-orbital bar, orbital shadowing, widened mandibular angle and anteriorly projected menton, and widened dorsal hump.       In order to address these points, surgical intervention would include recontouring and " effacement of her supra-orbital ridge, bilateral brow pexy, feminizing rhinoplasty, osteoplasty of her chin and jaw, upper lip lift, and thyroid reduction chondroplasty.     Completion of these procedures would greatly ameliorate the patient's gender dysphoria, maximize the femininity of the face, minimize the stigmata of masculinity, and facilitate social transitioning to the identified gender in a safe manner. The patient reports a significant history of verbal and emotional trauma secondary to trying to pass as their identified gender, which has impeded social transitioning, exacerbating the gender dysphoria. These facial features greatly impede passing as their identified gender.     This would ideally be approached in a staged fashion as follows:  Stage I: Forehead and supra-orbital ridge recontouring, correction of brow ptosis, lip lift  Stage II: Mandible and chin recontouring  Stage III: Feminizing rhinoplasty    I have placed an order for a CT of the face for preoperative planning.     I explained the risks to include bleeding, infection, injury to surrounding structures, fluid collection, wound healing difficulties, contour deformity, asymmetry, need for revision surgery, incomplete bone healing, hardware infection, and dissatisfaction with the surgical outcome.  Patient accepts these risks and wishes to proceed with surgery.      Total time spent with patient was 45 min of which greater than 50% was in counseling.     Shayla Cardenas MD  Pediatric Cleft and Craniofacial Surgery  Division of Plastic Surgery  [facility]  Pager: [phone]        Again, thank you for allowing me to participate in the care of your patient.      Sincerely,    Shayla Cardenas MD

## 2020-10-16 NOTE — LETTER
"10/16/2020       RE: Robert Bazzi  [address]  [place] [address]     Dear Colleague,    Thank you for referring your patient, Robert Bazzi, to the [facility] PLASTIC AND RECONSTRUCTIVE SURGERY CLINIC [place] [facility]. Please see a copy of my visit note below.    PLASTIC SURGERY HISTORY AND PHYSICAL    Chief Complaint: Gender dysphoria, requesting facial gender confirmation surgery.     HPI: Patient is a 25 year old trans-person who prefers she/her/hers pronouns, requesting facial gender confirmation surgery. Chosen name is Ilan. Patient has been considering undergoing facial surgery for the past 1 year.  By undergoing facial surgery, the goal is to achieve better congruence between the physical appearance with gender identity.  The patient transitioned 2 years ago.  Reports supportive family and friends.  The patient has been on estrogen/progesterone/finasteride therapy for the past 1.5 years.  Denies smoking. Denies family h/o bleeding disorders. The patient had orthognathic surgery in 2017. Denies history of facial trauma.    Patient has been recipient of transphobic slurs, being called \"trap\" in the past. Has been called deluded, told to get institutionalized.    Her specific areas of concern include her protrusive brow ridge, the shadowing of her eyes, and the forward prominence of her chin.    PMH:   Past Medical History:   Diagnosis Date     Anxiety        PSH:   Past Surgical History:   Procedure Laterality Date     ENT SURGERY       LE FORT ONE N/A 6/8/2017     FH:   Family History   Problem Relation Age of Onset     Macular Degeneration Father      Glaucoma No family hx of      SH:   Social History     Tobacco Use     Smoking status: Never Smoker     Smokeless tobacco: Never Used   Substance Use Topics     Alcohol use: Yes     Drug use: None      Currently living in [facility]. Not working or in school. Graduated from [employer], studying theatre, Spring " "2020. Living with friends and partner.     MEDS:  Current Outpatient Medications:      amphetamine-dextroamphetamine (ADDERALL) 10 MG tablet, Take 10 mg by mouth 2 times daily as needed, Disp: , Rfl:      estradiol (ESTRACE) 2 MG tablet, Take 7 mg by mouth daily, Disp: , Rfl:      finasteride (PROPECIA) 1 MG tablet, Take 1 mg by mouth daily, Disp: , Rfl:      FLUoxetine (PROZAC) 20 MG capsule, Take 40 mg by mouth daily, Disp: , Rfl:      PROGESTERONE MICRONIZED PO, Take 10 mg by mouth daily, Disp: , Rfl:        ALLERGIES:   No Known Allergies     ROS: Denies chest pain, shortness of breath, diabetes, MI, CVA, DVT, PE, and bleeding disorders.     PHYSICAL EXAMINATION:   Ht 1.778 m (5' 10\")   Wt 77.1 kg (170 lb)   BMI 24.39 kg/m     BMI: Body mass index is 24.39 kg/m .  Limited given virtual visit.  General: No acute distress.  Appears masculine.  FACIAL EXAM  HAIRLINE:  Receded, rounded hairline  FOREHEAD: flattened/concave forehead with prominent fronto-orbital bar, orbital hooding  NOSE: appropriate projection, dorsal hump  LIPS: appropriate cutaneous length, full  JAW: prominent menton and widened mandibular body/angle  NECK: visible thyroid cartilage prominence  FACIAL HAIR: minimal, difficult to completely assess     ASSESSMENT: Gender dysphoria, requesting facial gender confirmation surgery.     PLAN: Patient has provided us with a letter of support.  We will review this.  Once these are obtained, patient is a potential candidate for facial gender confirmation surgery to help address the gender dysphoria.  I explained this procedure in detail today.      Components of the exam that are overtly masculine include bilaterally recessed hairline, high forehead, prominent fronto-orbital / supra-orbital bar, orbital shadowing, widened mandibular angle and anteriorly projected menton, and widened dorsal hump.       In order to address these points, surgical intervention would include recontouring and effacement of " her supra-orbital ridge, bilateral brow pexy, feminizing rhinoplasty, osteoplasty of her chin and jaw, upper lip lift, and thyroid reduction chondroplasty.     Completion of these procedures would greatly ameliorate the patient's gender dysphoria, maximize the femininity of the face, minimize the stigmata of masculinity, and facilitate social transitioning to the identified gender in a safe manner. The patient reports a significant history of verbal and emotional trauma secondary to trying to pass as their identified gender, which has impeded social transitioning, exacerbating the gender dysphoria. These facial features greatly impede passing as their identified gender.     This would ideally be approached in a staged fashion as follows:  Stage I: Forehead and supra-orbital ridge recontouring, correction of brow ptosis, lip lift  Stage II: Mandible and chin recontouring  Stage III: Feminizing rhinoplasty    I have placed an order for a CT of the face for preoperative planning.     I explained the risks to include bleeding, infection, injury to surrounding structures, fluid collection, wound healing difficulties, contour deformity, asymmetry, need for revision surgery, incomplete bone healing, hardware infection, and dissatisfaction with the surgical outcome.  Patient accepts these risks and wishes to proceed with surgery.      Total time spent with patient was 45 min of which greater than 50% was in counseling.     Again, thank you for allowing me to participate in the care of your patient.  Sincerely,    Shayla Cardenas MD  Pediatric Cleft and Craniofacial Surgery  Division of Plastic Surgery  [facility]  Pager: [phone]

## 2020-10-16 NOTE — PROGRESS NOTES
"PLASTIC SURGERY HISTORY AND PHYSICAL    Chief Complaint: Gender dysphoria, requesting facial gender confirmation surgery.     HPI: Patient is a 25 year old trans-person who prefers she/her/hers pronouns, requesting facial gender confirmation surgery. Chosen name is Ilan. Patient has been considering undergoing facial surgery for the past 1 year.  By undergoing facial surgery, the goal is to achieve better congruence between the physical appearance with gender identity.  The patient transitioned 2 years ago.  Reports supportive family and friends.  The patient has been on estrogen/progesterone/finasteride therapy for the past 1.5 years.  Denies smoking. Denies family h/o bleeding disorders. The patient had orthognathic surgery in 2017. Denies history of facial trauma.    Patient has been recipient of transphobic slurs, being called \"trap\" in the past. Has been called deluded, told to get institutionalized.    Her specific areas of concern include her protrusive brow ridge, the shadowing of her eyes, and the forward prominence of her chin.    PMH:   Past Medical History:   Diagnosis Date     Anxiety        PSH:   Past Surgical History:   Procedure Laterality Date     ENT SURGERY       LE FORT ONE N/A 6/8/2017       FH:   Family History   Problem Relation Age of Onset     Macular Degeneration Father      Glaucoma No family hx of         SH:   Social History     Tobacco Use     Smoking status: Never Smoker     Smokeless tobacco: Never Used   Substance Use Topics     Alcohol use: Yes     Drug use: None      Currently living in [facility]. Not working or in school. Graduated from [employer], studying theatre, Spring 2020. Living with friends and partner.     MEDS:     Current Outpatient Medications:      amphetamine-dextroamphetamine (ADDERALL) 10 MG tablet, Take 10 mg by mouth 2 times daily as needed, Disp: , Rfl:      estradiol (ESTRACE) 2 MG tablet, Take 7 mg by mouth daily, Disp: , Rfl:      finasteride (PROPECIA) 1 MG tablet, " "Take 1 mg by mouth daily, Disp: , Rfl:      FLUoxetine (PROZAC) 20 MG capsule, Take 40 mg by mouth daily, Disp: , Rfl:      PROGESTERONE MICRONIZED PO, Take 10 mg by mouth daily, Disp: , Rfl:        ALLERGIES:   No Known Allergies     ROS: Denies chest pain, shortness of breath, diabetes, MI, CVA, DVT, PE, and bleeding disorders.     PHYSICAL EXAMINATION:   Ht 1.778 m (5' 10\")   Wt 77.1 kg (170 lb)   BMI 24.39 kg/m     BMI: Body mass index is 24.39 kg/m .  Limited given virtual visit.  General: No acute distress.  Appears masculine.  FACIAL EXAM  HAIRLINE:  Receded, rounded hairline  FOREHEAD: flattened/concave forehead with prominent fronto-orbital bar, orbital hooding  NOSE: appropriate projection, dorsal hump  LIPS: appropriate cutaneous length, full  JAW: prominent menton and widened mandibular body/angle  NECK: visible thyroid cartilage prominence  FACIAL HAIR: minimal, difficult to completely assess     ASSESSMENT: Gender dysphoria, requesting facial gender confirmation surgery.     PLAN: Patient has provided us with a letter of support.  We will review this.  Once these are obtained, patient is a potential candidate for facial gender confirmation surgery to help address the gender dysphoria.  I explained this procedure in detail today.      Components of the exam that are overtly masculine include bilaterally recessed hairline, high forehead, prominent fronto-orbital / supra-orbital bar, orbital shadowing, widened mandibular angle and anteriorly projected menton, and widened dorsal hump.       In order to address these points, surgical intervention would include recontouring and effacement of her supra-orbital ridge, bilateral brow pexy, feminizing rhinoplasty, osteoplasty of her chin and jaw, upper lip lift, and thyroid reduction chondroplasty.     Completion of these procedures would greatly ameliorate the patient's gender dysphoria, maximize the femininity of the face, minimize the stigmata of " masculinity, and facilitate social transitioning to the identified gender in a safe manner. The patient reports a significant history of verbal and emotional trauma secondary to trying to pass as their identified gender, which has impeded social transitioning, exacerbating the gender dysphoria. These facial features greatly impede passing as their identified gender.     This would ideally be approached in a staged fashion as follows:  Stage I: Forehead and supra-orbital ridge recontouring, correction of brow ptosis, lip lift  Stage II: Mandible and chin recontouring  Stage III: Feminizing rhinoplasty    I have placed an order for a CT of the face for preoperative planning.     I explained the risks to include bleeding, infection, injury to surrounding structures, fluid collection, wound healing difficulties, contour deformity, asymmetry, need for revision surgery, incomplete bone healing, hardware infection, and dissatisfaction with the surgical outcome.  Patient accepts these risks and wishes to proceed with surgery.      Total time spent with patient was 45 min of which greater than 50% was in counseling.     Shayla Cardenas MD  Pediatric Cleft and Craniofacial Surgery  Division of Plastic Surgery  [facility]  Pager: [phone]

## 2020-10-16 NOTE — NURSING NOTE
"Chief Complaint   Patient presents with     Consult     new [facility]       Vitals:    10/16/20 0953   Weight: 77.1 kg (170 lb)   Height: 1.778 m (5' 10\")       Body mass index is 24.39 kg/m .    Juarez Perez, EMT    "

## 2020-10-20 ENCOUNTER — ANCILLARY PROCEDURE (OUTPATIENT)
Dept: ULTRASOUND IMAGING | Facility: CLINIC | Age: 25
End: 2020-10-20
Attending: NURSE PRACTITIONER
Payer: COMMERCIAL

## 2020-10-20 DIAGNOSIS — I86.1 LEFT VARICOCELE: ICD-10-CM

## 2020-10-20 DIAGNOSIS — N50.89 MASS OF RIGHT TESTICLE: ICD-10-CM

## 2020-10-20 PROCEDURE — 93976 VASCULAR STUDY: CPT | Mod: KX

## 2020-10-20 PROCEDURE — 76870 US EXAM SCROTUM: CPT | Mod: KX | Performed by: RADIOLOGY

## 2020-11-10 DIAGNOSIS — F64.0 GENDER DYSPHORIA IN ADOLESCENT AND ADULT: Primary | ICD-10-CM

## 2020-11-11 ENCOUNTER — MEDICAL CORRESPONDENCE (OUTPATIENT)
Dept: HEALTH INFORMATION MANAGEMENT | Facility: CLINIC | Age: 25
End: 2020-11-11

## 2020-11-12 ENCOUNTER — ANCILLARY PROCEDURE (OUTPATIENT)
Dept: CT IMAGING | Facility: CLINIC | Age: 25
End: 2020-11-12
Attending: PLASTIC SURGERY
Payer: COMMERCIAL

## 2020-11-12 DIAGNOSIS — F64.0 GENDER DYSPHORIA IN ADOLESCENT AND ADULT: ICD-10-CM

## 2020-11-12 PROCEDURE — 70486 CT MAXILLOFACIAL W/O DYE: CPT | Performed by: RADIOLOGY

## 2020-11-24 ENCOUNTER — TELEPHONE (OUTPATIENT)
Dept: SURGERY | Facility: CLINIC | Age: 25
End: 2020-11-24

## 2020-11-24 ENCOUNTER — PATIENT OUTREACH (OUTPATIENT)
Dept: PLASTIC SURGERY | Facility: CLINIC | Age: 25
End: 2020-11-24

## 2020-11-24 NOTE — PROGRESS NOTES
[facility]:  Care Coordination Note     SITUATION   Patient is a 25 year old who is receiving support for:  Clinic Care Coordination - Follow-up (Received 2nd letter or FFS)  .    BACKGROUND     Received 2nd letter for FFS, however, initial letter from [place] was received and Prior Authorization was already sent to insurance.     ASSESSMENT     Surgery              CGC Assessment  Comprehensive Gender Care (CGC) Enrollment: Enrolled  Patient has a therapist: Yes  Letter of support #1: Received(From [employer] [employer])  Letter #1 Date: 11/11/20  Letter of support #2: Received([email or web address])  Letter #2 Date: 08/17/20  Surgery being considered: Yes(FFS)          PLAN          Nursing Interventions:   Reviewed letter of support for WPATH standards of care which is adequate.     Follow-up plan:  Check on prior authorization approval.        Mack Watson

## 2020-12-07 ENCOUNTER — TRANSFERRED RECORDS (OUTPATIENT)
Dept: HEALTH INFORMATION MANAGEMENT | Facility: CLINIC | Age: 25
End: 2020-12-07

## 2020-12-09 ENCOUNTER — TELEPHONE (OUTPATIENT)
Dept: SURGERY | Facility: CLINIC | Age: 25
End: 2020-12-09

## 2020-12-09 ENCOUNTER — PREP FOR PROCEDURE (OUTPATIENT)
Dept: PLASTIC SURGERY | Facility: CLINIC | Age: 25
End: 2020-12-09

## 2020-12-09 DIAGNOSIS — F64.0 GENDER DYSPHORIA IN ADOLESCENT AND ADULT: Primary | ICD-10-CM

## 2020-12-09 RX ORDER — CEFAZOLIN SODIUM 1 G/50ML
1 INJECTION, SOLUTION INTRAVENOUS SEE ADMIN INSTRUCTIONS
Status: CANCELLED | OUTPATIENT
Start: 2020-12-09

## 2020-12-09 RX ORDER — CEFAZOLIN SODIUM 2 G/50ML
2 SOLUTION INTRAVENOUS
Status: CANCELLED | OUTPATIENT
Start: 2020-12-09

## 2020-12-09 NOTE — TELEPHONE ENCOUNTER
eceived [facility] approval for FG-#[id number] with date span 12/1/20-5/29/21

## 2021-01-14 DIAGNOSIS — Z11.59 ENCOUNTER FOR SCREENING FOR OTHER VIRAL DISEASES: ICD-10-CM

## 2021-02-26 ENCOUNTER — OFFICE VISIT (OUTPATIENT)
Dept: PLASTIC SURGERY | Facility: CLINIC | Age: 26
End: 2021-02-26
Payer: COMMERCIAL

## 2021-02-26 ENCOUNTER — PREP FOR PROCEDURE (OUTPATIENT)
Dept: SURGERY | Facility: CLINIC | Age: 26
End: 2021-02-26

## 2021-02-26 VITALS
HEART RATE: 112 BPM | HEIGHT: 70 IN | SYSTOLIC BLOOD PRESSURE: 135 MMHG | DIASTOLIC BLOOD PRESSURE: 87 MMHG | BODY MASS INDEX: 24.39 KG/M2 | OXYGEN SATURATION: 97 %

## 2021-02-26 DIAGNOSIS — F64.0 GENDER DYSPHORIA IN ADOLESCENT AND ADULT: Primary | ICD-10-CM

## 2021-02-26 PROCEDURE — 99213 OFFICE O/P EST LOW 20 MIN: CPT | Mod: KX | Performed by: PLASTIC SURGERY

## 2021-02-26 ASSESSMENT — PAIN SCALES - GENERAL: PAINLEVEL: NO PAIN (0)

## 2021-02-26 NOTE — NURSING NOTE
"Chief Complaint   Patient presents with     RECHECK     pre op, FGCS - fem, DOS 3/9       Vitals:    02/26/21 1050   BP: 135/87   BP Location: Left arm   Patient Position: Chair   Cuff Size: Adult Regular   Pulse: 112   SpO2: 97%   Height: 1.778 m (5' 10\")       Body mass index is 24.39 kg/m .    ANG Davidson    "

## 2021-02-26 NOTE — LETTER
2/26/2021       RE: Ilan Bazzi  [address]  [place] [address]     Dear Colleague,    Thank you for referring your patient, Ilan Bazzi, to the [facility] [place] at [facility]. Please see a copy of my visit note below.    PLASTIC SURGERY OUTPATIENT NOTE     SUBJECTIVE  No interim issues. Patient mentioned that she is interested in Dakota's Apple reduction.    PHYSICAL EXAM  General: No acute distress.  Appears masculine.  FACIAL EXAM  HAIRLINE: Moderate bitemporal recessions  FOREHEAD: Round, alternating convex/concave with prominence of glabella, supero-lateral orbit, acute naso-frontal transition, brow ptosis  NOSE: straight dorsum, appropriately projected/rotated  LIPS: appropriate incisor show, vermillion full   JAW: moderate bigonial width, excess vertical length of chin  NECK: visible thyroid cartilage prominence  FACIAL HAIR: minimal    ASSESSMENT/PLAN  25 year old transgender lady with gender dysphoria presents in anticipation for facial gender confirmation surgery, including Recontouring of forehead, fronto-orbital bar, bilateral brow pexy, ostectomy and recontouring of mandible and chin, Thyroid reduction chondroplasty. All questions answered today. Risks/benefits discussed. Patient remains amenable to surgery, which is scheduled for 3/9/21.    Total time spent with for this visit was 20 minutes, including review of documentation, counseling/discussion with patient, documentation, and organizing any potential follow-up.    Shayla Cardenas MD  Pediatric Cleft and Craniofacial Surgery  Division of Plastic Surgery  Pager: [phone]

## 2021-02-26 NOTE — PROGRESS NOTES
PLASTIC SURGERY OUTPATIENT NOTE     SUBJECTIVE  No interim issues. Patient mentioned that she is interested in Dakota's Apple reduction.    PHYSICAL EXAM  General: No acute distress.  Appears masculine.  FACIAL EXAM  HAIRLINE: Moderate bitemporal recessions  FOREHEAD: Round, alternating convex/concave with prominence of glabella, supero-lateral orbit, acute naso-frontal transition, brow ptosis  NOSE: straight dorsum, appropriately projected/rotated  LIPS: appropriate incisor show, vermillion full   JAW: moderate bigonial width, excess vertical length of chin  NECK: visible thyroid cartilage prominence  FACIAL HAIR: minimal      ASSESSMENT/PLAN  25 year old transgender lady with gender dysphoria presents in anticipation for facial gender confirmation surgery, including Recontouring of forehead, fronto-orbital bar, bilateral brow pexy, ostectomy and recontouring of mandible and chin, Thyroid reduction chondroplasty. All questions answered today. Risks/benefits discussed. Patient remains amenable to surgery, which is scheduled for 3/9/21.    Total time spent with for this visit was 20 minutes, including review of documentation, counseling/discussion with patient, documentation, and organizing any potential follow-up.    Shayla Cardenas MD  Pediatric Cleft and Craniofacial Surgery  Division of Plastic Surgery  Pager: [phone]

## 2021-03-02 NOTE — TELEPHONE ENCOUNTER
FUTURE VISIT INFORMATION      SURGERY INFORMATION:    Date: 3.9.21    Location: [facility] OR    Surgeon:  Ronald    Anesthesia Type:  General    Procedure: Recontouring of forehead, fronto-orbital bar, bilateral brow pexy, ostectomy and recontouring of mandible and chin, Thyroid reduction chondroplasty    Consult: 2.26.21    RECORDS REQUESTED FROM:       Primary Care Provider: Dr. Martinez

## 2021-03-06 DIAGNOSIS — Z11.59 ENCOUNTER FOR SCREENING FOR OTHER VIRAL DISEASES: ICD-10-CM

## 2021-03-06 LAB
LABORATORY COMMENT REPORT: NORMAL
SARS-COV-2 RNA RESP QL NAA+PROBE: NEGATIVE
SARS-COV-2 RNA RESP QL NAA+PROBE: NORMAL
SPECIMEN SOURCE: NORMAL
SPECIMEN SOURCE: NORMAL

## 2021-03-06 PROCEDURE — 87635 SARS-COV-2 COVID-19 AMP PRB: CPT | Performed by: PLASTIC SURGERY

## 2021-03-08 ENCOUNTER — OFFICE VISIT (OUTPATIENT)
Dept: SURGERY | Facility: CLINIC | Age: 26
End: 2021-03-08
Payer: COMMERCIAL

## 2021-03-08 ENCOUNTER — PRE VISIT (OUTPATIENT)
Dept: SURGERY | Facility: CLINIC | Age: 26
End: 2021-03-08

## 2021-03-08 ENCOUNTER — ANESTHESIA EVENT (OUTPATIENT)
Dept: SURGERY | Facility: CLINIC | Age: 26
End: 2021-03-08
Payer: COMMERCIAL

## 2021-03-08 VITALS
HEIGHT: 70 IN | SYSTOLIC BLOOD PRESSURE: 121 MMHG | BODY MASS INDEX: 24.05 KG/M2 | HEART RATE: 76 BPM | TEMPERATURE: 98.2 F | WEIGHT: 168 LBS | DIASTOLIC BLOOD PRESSURE: 78 MMHG | RESPIRATION RATE: 16 BRPM | OXYGEN SATURATION: 97 %

## 2021-03-08 DIAGNOSIS — F64.0 GENDER DYSPHORIA IN ADULT: ICD-10-CM

## 2021-03-08 DIAGNOSIS — Z01.818 PRE-OP EXAMINATION: Primary | ICD-10-CM

## 2021-03-08 LAB
CREAT SERPL-MCNC: 1.11 MG/DL (ref 0.52–1.04)
GFR SERPL CREATININE-BSD FRML MDRD: 68 ML/MIN/{1.73_M2}
HGB BLD-MCNC: 15 G/DL (ref 11.7–15.7)

## 2021-03-08 PROCEDURE — 85018 HEMOGLOBIN: CPT | Performed by: PATHOLOGY

## 2021-03-08 PROCEDURE — 99203 OFFICE O/P NEW LOW 30 MIN: CPT | Performed by: NURSE PRACTITIONER

## 2021-03-08 PROCEDURE — 82565 ASSAY OF CREATININE: CPT | Performed by: PATHOLOGY

## 2021-03-08 PROCEDURE — 36415 COLL VENOUS BLD VENIPUNCTURE: CPT | Performed by: PATHOLOGY

## 2021-03-08 ASSESSMENT — MIFFLIN-ST. JEOR: SCORE: 1582.29

## 2021-03-08 ASSESSMENT — PAIN SCALES - GENERAL: PAINLEVEL: NO PAIN (0)

## 2021-03-08 NOTE — H&P
Pre-Operative H & P     CC:  Preoperative exam to assess for increased cardiopulmonary risk while undergoing surgery and anesthesia.    Date of Encounter: 3/8/2021  Primary Care Physician:  Mulugeta Martinez  Ilan Bazzi is a 25 year old transgener person who presents for pre-operative H & P in preparation for Recontouring of forehead, fronto-orbital bar, bilateral brow pexy, ostectomy and recontouring of mandible and chin, Thyroid reduction chondroplasty with Shayla Cardenas MD on 3/9/2021 at [facility] under general anesthesia.      Ilan Bazzi is a transgender lady with gender dysphoria who was seen in plastic surgery consultation with Dr. Cardenas last fall to discuss facial gender confirmation surgery.   Ms. Bazzi prefers she/her/hers pronouns. Patient has been considering undergoing facial surgery for over a year.  By undergoing facial surgery, the goal is to achieve better congruence between the physical appearance with gender identity.  The patient transitioned over two years ago and has been on estrogen/progesterone/finasteride therapy for over a year and a half.  The patient had orthognathic surgery in 2017. Ilan Bazzi followed up with Dr. Cardenas on 2/26/2021 to further  Ms. Bazzi on the above procedure.  The patient presents to PAC and has opted to proceed with the above surgical intervention.      Dx:  Gender dysphoria in adolescent and adult      History is obtained from the patient and electronic health record.     Past Medical History  Past Medical History:   Diagnosis Date     Anxiety        Past Surgical History  Past Surgical History:   Procedure Laterality Date     ENT SURGERY      wisdom teeth extraction     LE FORT ONE N/A 6/8/2017    Procedure: LE FORT ONE;  LEF FORT ONE OSTEOTOMY;  Surgeon: Murray Tran DDS;  Location: [facility] OR       Hx of Blood transfusions/reactions: denies     Hx of abnormal bleeding or anti-platelet use: denies    Menstrual history: No LMP  recorded. 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  However, reports a family history of anesthesia complications.  Brother who had severe PONV and had to be admitted to hospital for treatment.  Dad with general anesthesia who woke and could hear talking but was unable to move.  [age]teen year old cousin with difficulty to get to sleep and inadvertently received 3X the normal dose of anesthesia with subsequent cardiac event.     Prior to Admission Medications  Current Outpatient Medications   Medication Sig Dispense Refill     amphetamine-dextroamphetamine (ADDERALL) 10 MG tablet Take 10 mg by mouth 2 times daily as needed       estradiol (ESTRACE) 2 MG tablet Take 7 mg by mouth 2 times daily        finasteride (PROPECIA) 1 MG tablet Take 1 mg by mouth every evening        FLUoxetine (PROZAC) 20 MG capsule Take 40 mg by mouth every morning        Multiple Vitamins-Minerals (MULTIVITAL PO) Take by mouth every morning        PROGESTERONE MICRONIZED PO Take 10 mg by mouth every evening          Allergies  Allergies   Allergen Reactions     Wellbutrin [Bupropion] Swelling     Pruritus and edema to feet       Social History  Social History     Socioeconomic History     Marital status: Single     Spouse name: Not on file     Number of children: Not on file     Years of education: Not on file     Highest education level: Not on file   Occupational History     Not on file   Social Needs     Financial resource strain: Not on file     Food insecurity     Worry: Not on file     Inability: Not on file     Transportation needs     Medical: Not on file     Non-medical: Not on file   Tobacco Use     Smoking status: Never Smoker     Smokeless tobacco: Never Used   Substance and Sexual Activity     Alcohol use: Yes     Drug use: Not on file     Sexual activity: Not on file   Lifestyle     Physical  "activity     Days per week: Not on file     Minutes per session: Not on file     Stress: Not on file   Relationships     Social connections     Talks on phone: Not on file     Gets together: Not on file     Attends Temple service: Not on file     Active member of club or organization: Not on file     Attends meetings of clubs or organizations: Not on file     Relationship status: Not on file     Intimate partner violence     Fear of current or ex partner: Not on file     Emotionally abused: Not on file     Physically abused: Not on file     Forced sexual activity: Not on file   Other Topics Concern     Not on file   Social History Narrative     Not on file       Family History  Family History   Problem Relation Age of Onset     Macular Degeneration Father      Glaucoma No family hx of       .    ROS/MED HX  ENT/Pulmonary:  - neg pulmonary ROS     Neurologic:  - neg neurologic ROS     Cardiovascular:  - neg cardiovascular ROS     METS/Exercise Tolerance: >4 METS Comment: Enjoys working out three times per week. Cardio and strength training.    Hematologic:  - neg hematologic  ROS     Musculoskeletal: Comment: Fracture upper extremity when toddler.      GI/Hepatic:  - neg GI/hepatic ROS     Renal/Genitourinary:  - neg Renal ROS     Endo:  - neg endo ROS     Psychiatric/Substance Use:     (+) psychiatric history depression (ADHD and h/o panic attacks.)  (-) alcohol abuse history and chronic opioid use history   Infectious Disease:  - neg infectious disease ROS     Malignancy:  - neg malignancy ROS     Other:  - neg other ROS            Temp: 98.2  F (36.8  C) Temp src: Oral BP: 121/78 Pulse: 76   Resp: 16 SpO2: 97 %         168 lbs 0 oz  5' 10\"[pt reported[   Body mass index is 24.11 kg/m .       Physical Exam  Constitutional: Awake, alert, cooperative, no apparent distress, and appears stated age.  Eyes: Pupils equal, round and reactive to light, extra ocular muscles intact, sclera clear, conjunctiva normal.  HENT: " Normocephalic, oral pharynx with moist mucus membranes, good dentition with permanent retainer on upper and lower dentition.  No goiter appreciated.   Respiratory: Clear to auscultation bilaterally, no crackles or wheezing.  Cardiovascular: Regular rate and rhythm, normal S1 and S2, and no murmur noted.  Carotids +2, no bruits. No edema. Palpable pulses to radial  DP and PT arteries.   GI: Normal bowel sounds, soft, non-distended, non-tender, no masses palpated, no hepatosplenomegaly.    Lymph/Hematologic: No cervical lymphadenopathy and no supraclavicular lymphadenopathy.  Genitourinary:  deferred  Skin: Warm and dry.  No rashes at anticipated surgical site.   Musculoskeletal: Full ROM of neck. There is no redness, warmth, or swelling of the joints. Gross motor strength is normal.    Neurologic: Awake, alert, oriented to name, place and time. Cranial nerves II-XII are grossly intact. Gait is normal.   Neuropsychiatric: Calm, cooperative. Normal affect.     Labs: (personally reviewed)    Lab Results   Component Value Date    HGB 15.0 03/08/2021         Creatinine   Date Value Ref Range Status   03/08/2021 1.11 (H) 0.52 - 1.04 mg/dL Final       ASSESSMENT and PLAN  Ilan Bazzi is a 25 year old adult scheduled to undergo Recontouring of forehead, fronto-orbital bar, bilateral brow pexy, ostectomy and recontouring of mandible and chin, Thyroid reduction chondroplasty with Shayla Cardenas MD on 3/9/2021 at [facility] under general anesthesia.       She has the following specific operative considerations:   - JENNIFER # of risks 1/8 = low risk  - VTE risk:  0.5%  - Risk of PONV score = 1-2.  If > 2, anti-emetic intervention recommended.      #  Cardiology   - Denies known coronary artery disease.  Denies cardiac symptoms.  METS:  >4. RCRI : No serious cardiac risks.  0.4 % risk of major adverse cardiac event.         #  Pulmonary   - no smoking hx    #  Neuro   - ADHD, hold Adderall DOS.   - Mood disorder, take  medications as prescribed     # Renal  - Creatinine elevated. Follow-up with PCP for further evaluation after surgery.     #  HEENT    - Gender dysphoria in adolescent and adult with above procedure to align more with gender identity.     #  ID  - COVID-19 testing per surgeon's office    #   Anesthesia considerations  - Patient has not had complications from anesthesia.  However, reports a family history of anesthesia complications.  Brother who had severe PONV and had to be admitted to hospital for treatment.  Dad with general anesthesia who woke and could hear talking but was unable to move.  [age]teen year old cousin with difficulty to get to sleep and inadvertently received 3X the normal dose of anesthesia with subsequent cardiac event.    -  Refer to PAC assessment in anesthesia records      Arrival time, NPO, shower and medication instructions provided by nursing staff today.  Preparing For Your Surgery handout given.  Patient was discussed with Dr Mcguire.      SCOTT Lerma CNP  Preoperative Assessment Center  [facility]  Phone: [phone]  Fax: [phone]

## 2021-03-08 NOTE — ANESTHESIA PREPROCEDURE EVALUATION
Anesthesia Pre-Procedure Evaluation    Patient: Ilan Bazzi   MRN: [id number] : 1995        Preoperative Diagnosis: Gender dysphoria in adolescent and adult [F64.0]   Procedure : Procedure(s):  Recontouring of forehead, fronto-orbital bar, bilateral brow pexy, ostectomy and recontouring of mandible and chin, Thyroid reduction chondroplasty     Past Medical History:   Diagnosis Date     Anxiety       Past Surgical History:   Procedure Laterality Date     ENT SURGERY      wisdom teeth extraction     LE FORT ONE N/A 2017    Procedure: LE FORT ONE;  LEF FORT ONE OSTEOTOMY;  Surgeon: Murray Tran DDS;  Location: [facility] OR      Allergies   Allergen Reactions     Wellbutrin [Bupropion] Swelling     Pruritus and edema to feet      Social History     Tobacco Use     Smoking status: Never Smoker     Smokeless tobacco: Never Used   Substance Use Topics     Alcohol use: Yes      Wt Readings from Last 1 Encounters:   21 76.2 kg (168 lb)        Anesthesia Evaluation   Pt has had prior anesthetic. Type: General (Patient has not had trouble with anesthesia, but (+) family hx. ).    History of anesthetic complications   Family h/o SEVERE PONV.  Brother had to be hospitalized because of PONV.  Dad with general anesthesia who woke and could hear talking but was unable to move.  14 year-old cousin with difficulty to get to sleep and inadvert.    ROS/MED HX  ENT/Pulmonary:  - neg pulmonary ROS     Neurologic:  - neg neurologic ROS     Cardiovascular:  - neg cardiovascular ROS     METS/Exercise Tolerance: >4 METS Comment: Enjoys working out three times per week. Cardio and strength training.    Hematologic:  - neg hematologic  ROS     Musculoskeletal: Comment: Fracture upper extremity when toddler.      GI/Hepatic:  - neg GI/hepatic ROS     Renal/Genitourinary:  - neg Renal ROS     Endo:  - neg endo ROS     Psychiatric/Substance Use:     (+) psychiatric history depression (ADHD and h/o panic attacks.)  (-) alcohol abuse  history and chronic opioid use history   Infectious Disease:  - neg infectious disease ROS     Malignancy:  - neg malignancy ROS     Other:  - neg other ROS          Physical Exam    Airway        Mallampati: I   TM distance: > 3 FB   Neck ROM: full   Mouth opening: > 3 cm    Respiratory Devices and Support         Dental  no notable dental history         Cardiovascular          Rhythm and rate: regular and normal     Pulmonary           breath sounds clear to auscultation       Other findings: Permanent retainer on upper and lower dentition.     OUTSIDE LABS:  CBC: No results found for: WBC, HGB, HCT, PLT  BMP: No results found for: NA, POTASSIUM, CHLORIDE, CO2, BUN, CR, GLC  COAGS: No results found for: PTT, INR, FIBR  POC: No results found for: BGM, HCG, HCGS  HEPATIC: No results found for: ALBUMIN, PROTTOTAL, ALT, AST, GGT, ALKPHOS, BILITOTAL, BILIDIRECT, LIVE  OTHER: No results found for: PH, LACT, A1C, RYLEY, PHOS, MAG, LIPASE, AMYLASE, TSH, T4, T3, CRP, SED    Anesthesia Plan    ASA Status:  2      Anesthesia Type: General.     - Airway: ETT   Induction: Intravenous.   Maintenance: Balanced.        Consents    Anesthesia Plan(s) and associated risks, benefits, and realistic alternatives discussed. Questions answered and patient/representative(s) expressed understanding.     - Discussed with:  Patient      - Extended Intubation/Ventilatory Support Discussed: No.      - Patient is DNR/DNI Status: No    Use of blood products discussed: No .     Postoperative Care    Pain management: Multi-modal analgesia.   PONV prophylaxis: Ondansetron (or other 5HT-3), Dexamethasone or Solumedrol     Comments:    Prior feasible airway with DL.  Plan: Nasoray ETT 7.5           PAC Discussion and Assessment    ASA Classification: 2  Case is suitable for: [facility]  Anesthetic techniques and relevant risks discussed: GA                  PAC Resident/NP Anesthesia Assessment: Ilan Bazzi is a 25-year-old transgener person  scheduled for Recontouring of forehead, fronto-orbital bar, bilateral brow pexy, ostectomy and recontouring of mandible and chin, Thyroid reduction chondroplasty with Shayla Cardenas MD on 3/9/2021 at [facility] under general anesthesia.      Ilan Bazzi is a transgender lady with gender dysphoria who was seen in plastic surgery consultation with Dr. Cardenas last fall to discuss facial gender confirmation surgery.   Ms. Bazzi prefers she/her/hers pronouns. Patient has been considering undergoing facial surgery for over a year.  By undergoing facial surgery, the goal is to achieve better congruence between the physical appearance with gender identity.  The patient transitioned over two years ago and has been on estrogen/progesterone/finasteride therapy for over a year and a half.  The patient had orthognathic surgery in 2017. Ilan Bazzi followed up with Dr. Cardenas on 2/26/2021 to further  Ms. Bazzi on the above procedure.  The patient presents to PAC and has opted to proceed with the above surgical intervention.      Dx:  Gender dysphoria in adolescent and adult     She has the following specific operative considerations:   - JENNIFER # of risks 1/8 = low risk  - VTE risk:  0.5%  - Risk of PONV score = 1-2.  If > 2, anti-emetic intervention recommended.      #  Cardiology   - Denies known coronary artery disease.  Denies cardiac symptoms.  METS:  >4. RCRI : No serious cardiac risks.  0.4 % risk of major adverse cardiac event.         #  Pulmonary   - no smoking hx    #  Neuro   - ADHD, hold Adderall DOS.   - Mood disorder, take medications as prescribed     #  HEENT    - Gender dysphoria in adolescent and adult with above procedure to align more with gender identity.     #  ID  - COVID-19 testing per surgeon's office    #   Anesthesia considerations  - Patient has not had complications from anesthesia.  However, reports a family history of anesthesia complications.  Brother who had severe PONV and had to  be admitted to hospital for treatment.  Dad with general anesthesia who woke and could hear talking but was unable to move.  [age]teen year old cousin with difficulty to get to sleep and inadvertently received 3X the normal dose of anesthesia with subsequent cardiac event.    -  Refer to PAC assessment in anesthesia records      Arrival time, NPO, shower and medication instructions provided by nursing staff today.  Preparing For Your Surgery handout given.  Patient was discussed with Dr Mcguire.    Reviewed and Signed by PAC Mid-Level Provider/Resident  Mid-Level Provider/Resident: Dorie Cardoso APRLIZ CNP  Date: 3/8/2021                                 Dorie Cardoso, SCOTT CNP

## 2021-03-08 NOTE — PATIENT INSTRUCTIONS
Preparing for Your Surgery      Name:  Ilan Bazzi   MRN:  [id number]   :  1995   Today's Date:  3/8/2021       Arriving for surgery:  Surgery date:  3/9/21  Arrival time:  6 am    Restrictions due to COVID 19:  One consistent visitor per patient is allowed  No ill visitors  All visitors must wear face mask     parking is available for anyone with mobility limitations or disabilities.  ([facility]  24 hours/ 7 days a week; [facility]  7 am- 3:30 pm, Mon- Fri)    Please come to:  [facility] [address]  [address] [place]  [address]    -Proceed to the 3rd floor, check in at the Adult Surgery Waiting Lounge. [phone]    If an escort is needed stop at the Information Desk in the lobby. Inform the information person that you are here for surgery. An escort to the Adult Surgery Waiting Lounge will be provided.    What can I eat or drink?  -  You may eat and drink normally for up to 8 hours before your surgery. (Until 12 Midnight)  -  You may have clear liquids until 2 hours before surgery. 6 am)    Examples of clear liquids:  Water  Clear broth  Juices (apple, white grape, white cranberry  and cider) without pulp  Noncarbonated, powder based beverages  (lemonade and Frederick-Aid)  Sodas (Sprite, 7-Up, ginger ale and seltzer)  Coffee or tea (without milk or cream)  Gatorade    -  No Alcohol for at least 24 hours before surgery     Which medicines can I take?  Hold Aspirin for 7 days before surgery.   * Hold Multivitamins for 7 days before surgery. Hold Multivitamin starting now, if you EJ already.  Hold Supplements for 7 days before surgery.  Hold Ibuprofen (Advil, Motrin) for 1 day before surgery--unless otherwise directed by surgeon.  Hold Naproxen (Aleve) for 4 days before surgery.    -  DO NOT take these medications the day of surgery:  Amphetamine-Dextroamphetamine (Adderall),     -  PLEASE TAKE these medications the day of  surgery:  Estradiol (Estrace), Fluoxetine (Prozac),  Acetaminophen (Tylenol) if needed    How do I prepare myself?  - Please take 2 showers before surgery using Scrubcare or Hibiclens soap.    Use this soap only from the neck to your toes.     Leave the soap on your skin for one minute--then rinse thoroughly.      You may use your own shampoo and conditioner; no other hair products.   - Please remove all jewelry and body piercings.  - No lotions, deodorants or fragrance.  - No makeup or fingernail polish.   - Bring your ID and insurance card.    - All patients are required to have a Covid-19 test within 4 days of surgery/procedure.      -Patients will be contacted by the [facility] scheduling team within 1 week of surgery to make an appointment.      - Patients may call the Scheduling team at [phone] if they have not been scheduled within 4 days of  surgery.      ALL PATIENTS GOING HOME THE SAME DAY OF SURGERY ARE REQUIRED TO HAVE A RESPONSIBLE ADULT TO DRIVE AND BE IN ATTENDANCE WITH THEM FOR 24 HOURS FOLLOWING SURGERY     Questions or Concerns:    - For any questions regarding the day of surgery or your hospital stay, please contact the Pre Admission Nursing Office at [phone].       - If you have health changes between today and your surgery please call your surgeon.       For questions after surgery please call your surgeons office.     AFTER YOUR SURGERY  Breathing exercises   Breathing exercises help you recover faster. Take deep breaths and let the air out slowly. This will:     Help you wake up after surgery.    Help prevent complications like pneumonia.  Preventing complications will help you go home sooner.   Nausea and vomiting   You may feel sick to your stomach after surgery; if so, let your nurse know.    Pain control:  After surgery, you may have pain. Our goal is to help you manage your pain. Pain medicine will help you feel comfortable enough to do activities that will help you heal.   These activities may include breathing exercises, walking and physical therapy.   To help your health care team treat your pain we will ask: 1) If you have pain  2) where it is located 3) describe your pain in your words  Methods of pain control include medications given by mouth, vein or by nerve block for some surgeries.  Sequential Compression Device (SCD):  You may need to wear SCD S (also called pneumo boots)on your legs or feet. These are wraps connected to a machine that pumps in air and releases it. The repeated pumping helps prevent blood clots from forming.

## 2021-03-09 ENCOUNTER — HOSPITAL ENCOUNTER (OUTPATIENT)
Facility: CLINIC | Age: 26
Setting detail: OBSERVATION
Discharge: HOME | End: 2021-03-10
Attending: PLASTIC SURGERY | Admitting: PLASTIC SURGERY
Payer: COMMERCIAL

## 2021-03-09 ENCOUNTER — ANESTHESIA (OUTPATIENT)
Dept: SURGERY | Facility: CLINIC | Age: 26
End: 2021-03-09
Payer: COMMERCIAL

## 2021-03-09 DIAGNOSIS — F64.0 GENDER DYSPHORIA IN ADOLESCENT AND ADULT: ICD-10-CM

## 2021-03-09 DIAGNOSIS — F64.0 GENDER DYSPHORIA IN ADOLESCENT AND ADULT: Primary | ICD-10-CM

## 2021-03-09 LAB — GLUCOSE BLDC GLUCOMTR-MCNC: 93 MG/DL (ref 70–99)

## 2021-03-09 PROCEDURE — 250N000009 HC RX 250: Performed by: PLASTIC SURGERY

## 2021-03-09 PROCEDURE — 999N001017 HC STATISTIC GLUCOSE BY METER IP

## 2021-03-09 PROCEDURE — 250N000011 HC RX IP 250 OP 636: Performed by: ANESTHESIOLOGY

## 2021-03-09 PROCEDURE — 96374 THER/PROPH/DIAG INJ IV PUSH: CPT

## 2021-03-09 PROCEDURE — 250N000009 HC RX 250: Performed by: NURSE ANESTHETIST, CERTIFIED REGISTERED

## 2021-03-09 PROCEDURE — C1713 ANCHOR/SCREW BN/BN,TIS/BN: HCPCS | Performed by: PLASTIC SURGERY

## 2021-03-09 PROCEDURE — 360N000077 HC SURGERY LEVEL 4, PER MIN: Performed by: PLASTIC SURGERY

## 2021-03-09 PROCEDURE — 272N000001 HC OR GENERAL SUPPLY STERILE: Performed by: PLASTIC SURGERY

## 2021-03-09 PROCEDURE — 250N000013 HC RX MED GY IP 250 OP 250 PS 637: Performed by: STUDENT IN AN ORGANIZED HEALTH CARE EDUCATION/TRAINING PROGRAM

## 2021-03-09 PROCEDURE — 258N000001 HC RX 258: Performed by: PLASTIC SURGERY

## 2021-03-09 PROCEDURE — 250N000011 HC RX IP 250 OP 636: Performed by: PLASTIC SURGERY

## 2021-03-09 PROCEDURE — 370N000017 HC ANESTHESIA TECHNICAL FEE, PER MIN: Performed by: PLASTIC SURGERY

## 2021-03-09 PROCEDURE — 999N000141 HC STATISTIC PRE-PROCEDURE NURSING ASSESSMENT: Performed by: PLASTIC SURGERY

## 2021-03-09 PROCEDURE — 710N000009 HC RECOVERY PHASE 1, LEVEL 1, PER MIN: Performed by: PLASTIC SURGERY

## 2021-03-09 PROCEDURE — 250N000013 HC RX MED GY IP 250 OP 250 PS 637: Performed by: NURSE ANESTHETIST, CERTIFIED REGISTERED

## 2021-03-09 PROCEDURE — G0378 HOSPITAL OBSERVATION PER HR: HCPCS

## 2021-03-09 PROCEDURE — 250N000011 HC RX IP 250 OP 636: Performed by: NURSE ANESTHETIST, CERTIFIED REGISTERED

## 2021-03-09 PROCEDURE — 272N000002 HC OR SUPPLY OTHER OPNP: Performed by: PLASTIC SURGERY

## 2021-03-09 PROCEDURE — 250N000025 HC SEVOFLURANE, PER MIN: Performed by: PLASTIC SURGERY

## 2021-03-09 PROCEDURE — 258N000003 HC RX IP 258 OP 636: Performed by: NURSE ANESTHETIST, CERTIFIED REGISTERED

## 2021-03-09 PROCEDURE — 96376 TX/PRO/DX INJ SAME DRUG ADON: CPT | Mod: 59

## 2021-03-09 PROCEDURE — 250N000013 HC RX MED GY IP 250 OP 250 PS 637: Performed by: PLASTIC SURGERY

## 2021-03-09 DEVICE — IMPLANTABLE DEVICE: Type: IMPLANTABLE DEVICE | Site: FACE | Status: FUNCTIONAL

## 2021-03-09 RX ORDER — ACETAMINOPHEN 325 MG/10.15ML
650 LIQUID ORAL EVERY 6 HOURS
Status: DISCONTINUED | OUTPATIENT
Start: 2021-03-09 | End: 2021-03-10 | Stop reason: HOSPADM

## 2021-03-09 RX ORDER — PROPOFOL 10 MG/ML
INJECTION, EMULSION INTRAVENOUS PRN
Status: DISCONTINUED | OUTPATIENT
Start: 2021-03-09 | End: 2021-03-09

## 2021-03-09 RX ORDER — CHLORHEXIDINE GLUCONATE ORAL RINSE 1.2 MG/ML
15 SOLUTION DENTAL 2 TIMES DAILY
Status: DISCONTINUED | OUTPATIENT
Start: 2021-03-09 | End: 2021-03-10 | Stop reason: HOSPADM

## 2021-03-09 RX ORDER — BUPIVACAINE HYDROCHLORIDE AND EPINEPHRINE 2.5; 5 MG/ML; UG/ML
INJECTION, SOLUTION INFILTRATION; PERINEURAL PRN
Status: DISCONTINUED | OUTPATIENT
Start: 2021-03-09 | End: 2021-03-09 | Stop reason: HOSPADM

## 2021-03-09 RX ORDER — GINSENG 100 MG
CAPSULE ORAL PRN
Status: DISCONTINUED | OUTPATIENT
Start: 2021-03-09 | End: 2021-03-09 | Stop reason: HOSPADM

## 2021-03-09 RX ORDER — ONDANSETRON 4 MG/1
4 TABLET, ORALLY DISINTEGRATING ORAL EVERY 30 MIN PRN
Status: DISCONTINUED | OUTPATIENT
Start: 2021-03-09 | End: 2021-03-09 | Stop reason: HOSPADM

## 2021-03-09 RX ORDER — PROPOFOL 10 MG/ML
INJECTION, EMULSION INTRAVENOUS CONTINUOUS PRN
Status: DISCONTINUED | OUTPATIENT
Start: 2021-03-09 | End: 2021-03-09

## 2021-03-09 RX ORDER — SODIUM CHLORIDE, SODIUM LACTATE, POTASSIUM CHLORIDE, CALCIUM CHLORIDE 600; 310; 30; 20 MG/100ML; MG/100ML; MG/100ML; MG/100ML
INJECTION, SOLUTION INTRAVENOUS CONTINUOUS
Status: DISCONTINUED | OUTPATIENT
Start: 2021-03-09 | End: 2021-03-09 | Stop reason: HOSPADM

## 2021-03-09 RX ORDER — CEFAZOLIN SODIUM 2 G/100ML
2 INJECTION, SOLUTION INTRAVENOUS
Status: COMPLETED | OUTPATIENT
Start: 2021-03-09 | End: 2021-03-09

## 2021-03-09 RX ORDER — NALOXONE HYDROCHLORIDE 0.4 MG/ML
0.4 INJECTION, SOLUTION INTRAMUSCULAR; INTRAVENOUS; SUBCUTANEOUS
Status: DISCONTINUED | OUTPATIENT
Start: 2021-03-09 | End: 2021-03-10 | Stop reason: HOSPADM

## 2021-03-09 RX ORDER — CEFAZOLIN SODIUM 1 G/3ML
INJECTION, POWDER, FOR SOLUTION INTRAMUSCULAR; INTRAVENOUS PRN
Status: DISCONTINUED | OUTPATIENT
Start: 2021-03-09 | End: 2021-03-09

## 2021-03-09 RX ORDER — NALOXONE HYDROCHLORIDE 0.4 MG/ML
0.2 INJECTION, SOLUTION INTRAMUSCULAR; INTRAVENOUS; SUBCUTANEOUS
Status: DISCONTINUED | OUTPATIENT
Start: 2021-03-09 | End: 2021-03-09 | Stop reason: HOSPADM

## 2021-03-09 RX ORDER — DEXAMETHASONE SODIUM PHOSPHATE 4 MG/ML
INJECTION, SOLUTION INTRA-ARTICULAR; INTRALESIONAL; INTRAMUSCULAR; INTRAVENOUS; SOFT TISSUE PRN
Status: DISCONTINUED | OUTPATIENT
Start: 2021-03-09 | End: 2021-03-09

## 2021-03-09 RX ORDER — FENTANYL CITRATE 50 UG/ML
INJECTION, SOLUTION INTRAMUSCULAR; INTRAVENOUS PRN
Status: DISCONTINUED | OUTPATIENT
Start: 2021-03-09 | End: 2021-03-09

## 2021-03-09 RX ORDER — ACETAMINOPHEN 160 MG
TABLET,DISINTEGRATING ORAL 2 TIMES DAILY
Status: DISCONTINUED | OUTPATIENT
Start: 2021-03-09 | End: 2021-03-10 | Stop reason: HOSPADM

## 2021-03-09 RX ORDER — NALOXONE HYDROCHLORIDE 0.4 MG/ML
0.2 INJECTION, SOLUTION INTRAMUSCULAR; INTRAVENOUS; SUBCUTANEOUS
Status: DISCONTINUED | OUTPATIENT
Start: 2021-03-09 | End: 2021-03-10 | Stop reason: HOSPADM

## 2021-03-09 RX ORDER — NALOXONE HYDROCHLORIDE 0.4 MG/ML
0.4 INJECTION, SOLUTION INTRAMUSCULAR; INTRAVENOUS; SUBCUTANEOUS
Status: DISCONTINUED | OUTPATIENT
Start: 2021-03-09 | End: 2021-03-09 | Stop reason: HOSPADM

## 2021-03-09 RX ORDER — SODIUM CHLORIDE, SODIUM LACTATE, POTASSIUM CHLORIDE, CALCIUM CHLORIDE 600; 310; 30; 20 MG/100ML; MG/100ML; MG/100ML; MG/100ML
INJECTION, SOLUTION INTRAVENOUS CONTINUOUS PRN
Status: DISCONTINUED | OUTPATIENT
Start: 2021-03-09 | End: 2021-03-09

## 2021-03-09 RX ORDER — LIDOCAINE 40 MG/G
CREAM TOPICAL
Status: DISCONTINUED | OUTPATIENT
Start: 2021-03-09 | End: 2021-03-10 | Stop reason: HOSPADM

## 2021-03-09 RX ORDER — BALANCED SALT SOLUTION 6.4; .75; .48; .3; 3.9; 1.7 MG/ML; MG/ML; MG/ML; MG/ML; MG/ML; MG/ML
SOLUTION OPHTHALMIC PRN
Status: DISCONTINUED | OUTPATIENT
Start: 2021-03-09 | End: 2021-03-09 | Stop reason: HOSPADM

## 2021-03-09 RX ORDER — HYDROMORPHONE HYDROCHLORIDE 1 MG/ML
.3-.5 INJECTION, SOLUTION INTRAMUSCULAR; INTRAVENOUS; SUBCUTANEOUS EVERY 10 MIN PRN
Status: DISCONTINUED | OUTPATIENT
Start: 2021-03-09 | End: 2021-03-09 | Stop reason: HOSPADM

## 2021-03-09 RX ORDER — MEPERIDINE HYDROCHLORIDE 25 MG/ML
12.5 INJECTION INTRAMUSCULAR; INTRAVENOUS; SUBCUTANEOUS
Status: DISCONTINUED | OUTPATIENT
Start: 2021-03-09 | End: 2021-03-09 | Stop reason: HOSPADM

## 2021-03-09 RX ORDER — ONDANSETRON 2 MG/ML
INJECTION INTRAMUSCULAR; INTRAVENOUS PRN
Status: DISCONTINUED | OUTPATIENT
Start: 2021-03-09 | End: 2021-03-09

## 2021-03-09 RX ORDER — OXYCODONE HCL 5 MG/5 ML
5 SOLUTION, ORAL ORAL EVERY 6 HOURS PRN
Status: DISCONTINUED | OUTPATIENT
Start: 2021-03-09 | End: 2021-03-09

## 2021-03-09 RX ORDER — OXYCODONE HCL 5 MG/5 ML
5-10 SOLUTION, ORAL ORAL EVERY 4 HOURS PRN
Status: DISCONTINUED | OUTPATIENT
Start: 2021-03-09 | End: 2021-03-10 | Stop reason: HOSPADM

## 2021-03-09 RX ORDER — CEFAZOLIN SODIUM 1 G/3ML
1 INJECTION, POWDER, FOR SOLUTION INTRAMUSCULAR; INTRAVENOUS EVERY 8 HOURS
Status: COMPLETED | OUTPATIENT
Start: 2021-03-10 | End: 2021-03-10

## 2021-03-09 RX ORDER — BACITRACIN 500 [USP'U]/G
OINTMENT OPHTHALMIC PRN
Status: DISCONTINUED | OUTPATIENT
Start: 2021-03-09 | End: 2021-03-09 | Stop reason: HOSPADM

## 2021-03-09 RX ORDER — ONDANSETRON 2 MG/ML
4 INJECTION INTRAMUSCULAR; INTRAVENOUS EVERY 30 MIN PRN
Status: DISCONTINUED | OUTPATIENT
Start: 2021-03-09 | End: 2021-03-09 | Stop reason: HOSPADM

## 2021-03-09 RX ORDER — IBUPROFEN 100 MG/5ML
5 SUSPENSION, ORAL (FINAL DOSE FORM) ORAL EVERY 6 HOURS
Status: DISCONTINUED | OUTPATIENT
Start: 2021-03-09 | End: 2021-03-10 | Stop reason: HOSPADM

## 2021-03-09 RX ORDER — GINSENG 100 MG
CAPSULE ORAL 2 TIMES DAILY
Status: DISCONTINUED | OUTPATIENT
Start: 2021-03-09 | End: 2021-03-10 | Stop reason: HOSPADM

## 2021-03-09 RX ORDER — CEFAZOLIN SODIUM 1 G/3ML
1 INJECTION, POWDER, FOR SOLUTION INTRAMUSCULAR; INTRAVENOUS SEE ADMIN INSTRUCTIONS
Status: DISCONTINUED | OUTPATIENT
Start: 2021-03-09 | End: 2021-03-09 | Stop reason: HOSPADM

## 2021-03-09 RX ORDER — LIDOCAINE HYDROCHLORIDE 20 MG/ML
INJECTION, SOLUTION INFILTRATION; PERINEURAL PRN
Status: DISCONTINUED | OUTPATIENT
Start: 2021-03-09 | End: 2021-03-09

## 2021-03-09 RX ADMIN — HYDROMORPHONE HYDROCHLORIDE 0.5 MG: 1 INJECTION, SOLUTION INTRAMUSCULAR; INTRAVENOUS; SUBCUTANEOUS at 18:39

## 2021-03-09 RX ADMIN — HYDROMORPHONE HYDROCHLORIDE 0.5 MG: 1 INJECTION, SOLUTION INTRAMUSCULAR; INTRAVENOUS; SUBCUTANEOUS at 19:02

## 2021-03-09 RX ADMIN — POLYETHYLENE GLYCOL 400 AND PROPYLENE GLYCOL 1 DROP: 4; 3 SOLUTION/ DROPS OPHTHALMIC at 08:24

## 2021-03-09 RX ADMIN — HYDROMORPHONE HYDROCHLORIDE 0.5 MG: 1 INJECTION, SOLUTION INTRAMUSCULAR; INTRAVENOUS; SUBCUTANEOUS at 14:48

## 2021-03-09 RX ADMIN — SODIUM CHLORIDE, POTASSIUM CHLORIDE, SODIUM LACTATE AND CALCIUM CHLORIDE: 600; 310; 30; 20 INJECTION, SOLUTION INTRAVENOUS at 10:47

## 2021-03-09 RX ADMIN — CEFAZOLIN 1 G: 1 INJECTION, POWDER, FOR SOLUTION INTRAMUSCULAR; INTRAVENOUS at 14:21

## 2021-03-09 RX ADMIN — FENTANYL CITRATE 50 MCG: 50 INJECTION, SOLUTION INTRAMUSCULAR; INTRAVENOUS at 08:46

## 2021-03-09 RX ADMIN — ROCURONIUM BROMIDE 50 MG: 10 INJECTION INTRAVENOUS at 08:23

## 2021-03-09 RX ADMIN — HYDROMORPHONE HYDROCHLORIDE 0.25 MG: 1 INJECTION, SOLUTION INTRAMUSCULAR; INTRAVENOUS; SUBCUTANEOUS at 14:21

## 2021-03-09 RX ADMIN — SUGAMMADEX 200 MG: 100 INJECTION, SOLUTION INTRAVENOUS at 17:39

## 2021-03-09 RX ADMIN — Medication 5 MG: at 19:41

## 2021-03-09 RX ADMIN — CEFAZOLIN 1 G: 1 INJECTION, POWDER, FOR SOLUTION INTRAMUSCULAR; INTRAVENOUS at 16:25

## 2021-03-09 RX ADMIN — ROCURONIUM BROMIDE 30 MG: 10 INJECTION INTRAVENOUS at 14:46

## 2021-03-09 RX ADMIN — CEFAZOLIN 2 G: 10 INJECTION, POWDER, FOR SOLUTION INTRAVENOUS at 08:30

## 2021-03-09 RX ADMIN — ACETAMINOPHEN 650 MG: 325 SOLUTION ORAL at 19:15

## 2021-03-09 RX ADMIN — HYDROMORPHONE HYDROCHLORIDE 0.25 MG: 1 INJECTION, SOLUTION INTRAMUSCULAR; INTRAVENOUS; SUBCUTANEOUS at 14:13

## 2021-03-09 RX ADMIN — SODIUM CHLORIDE, POTASSIUM CHLORIDE, SODIUM LACTATE AND CALCIUM CHLORIDE: 600; 310; 30; 20 INJECTION, SOLUTION INTRAVENOUS at 08:16

## 2021-03-09 RX ADMIN — CHLORHEXIDINE GLUCONATE 0.12% ORAL RINSE 15 ML: 1.2 LIQUID ORAL at 22:46

## 2021-03-09 RX ADMIN — HYDROMORPHONE HYDROCHLORIDE 0.5 MG: 1 INJECTION, SOLUTION INTRAMUSCULAR; INTRAVENOUS; SUBCUTANEOUS at 11:10

## 2021-03-09 RX ADMIN — CEFAZOLIN 1 G: 1 INJECTION, POWDER, FOR SOLUTION INTRAMUSCULAR; INTRAVENOUS at 23:51

## 2021-03-09 RX ADMIN — HYDROMORPHONE HYDROCHLORIDE 0.2 MG: 1 INJECTION, SOLUTION INTRAMUSCULAR; INTRAVENOUS; SUBCUTANEOUS at 12:00

## 2021-03-09 RX ADMIN — FENTANYL CITRATE 50 MCG: 50 INJECTION, SOLUTION INTRAMUSCULAR; INTRAVENOUS at 17:12

## 2021-03-09 RX ADMIN — PHENYLEPHRINE HYDROCHLORIDE 100 MCG: 10 INJECTION INTRAVENOUS at 13:09

## 2021-03-09 RX ADMIN — PHENYLEPHRINE HYDROCHLORIDE 100 MCG: 10 INJECTION INTRAVENOUS at 16:10

## 2021-03-09 RX ADMIN — PROPOFOL 50 MG: 10 INJECTION, EMULSION INTRAVENOUS at 17:19

## 2021-03-09 RX ADMIN — DEXAMETHASONE SODIUM PHOSPHATE 8 MG: 4 INJECTION, SOLUTION INTRAMUSCULAR; INTRAVENOUS at 09:14

## 2021-03-09 RX ADMIN — MIDAZOLAM 2 MG: 1 INJECTION INTRAMUSCULAR; INTRAVENOUS at 08:14

## 2021-03-09 RX ADMIN — PHENYLEPHRINE HYDROCHLORIDE 100 MCG: 10 INJECTION INTRAVENOUS at 10:40

## 2021-03-09 RX ADMIN — PROPOFOL 150 MG: 10 INJECTION, EMULSION INTRAVENOUS at 08:23

## 2021-03-09 RX ADMIN — ROCURONIUM BROMIDE 20 MG: 10 INJECTION INTRAVENOUS at 10:50

## 2021-03-09 RX ADMIN — IBUPROFEN 400 MG: 200 SUSPENSION ORAL at 21:19

## 2021-03-09 RX ADMIN — ONDANSETRON 4 MG: 2 INJECTION INTRAMUSCULAR; INTRAVENOUS at 17:25

## 2021-03-09 RX ADMIN — CEFAZOLIN 1 G: 1 INJECTION, POWDER, FOR SOLUTION INTRAMUSCULAR; INTRAVENOUS at 12:21

## 2021-03-09 RX ADMIN — DEXTROSE AND SODIUM CHLORIDE: 5; 450 INJECTION, SOLUTION INTRAVENOUS at 21:18

## 2021-03-09 RX ADMIN — FENTANYL CITRATE 50 MCG: 50 INJECTION, SOLUTION INTRAMUSCULAR; INTRAVENOUS at 08:22

## 2021-03-09 RX ADMIN — FENTANYL CITRATE 50 MCG: 50 INJECTION, SOLUTION INTRAMUSCULAR; INTRAVENOUS at 09:03

## 2021-03-09 RX ADMIN — OXYCODONE HYDROCHLORIDE 5 MG: 5 SOLUTION ORAL at 21:19

## 2021-03-09 RX ADMIN — HYDROMORPHONE HYDROCHLORIDE 0.3 MG: 1 INJECTION, SOLUTION INTRAMUSCULAR; INTRAVENOUS; SUBCUTANEOUS at 11:49

## 2021-03-09 RX ADMIN — PHENYLEPHRINE HYDROCHLORIDE 100 MCG: 10 INJECTION INTRAVENOUS at 14:02

## 2021-03-09 RX ADMIN — PHENYLEPHRINE HYDROCHLORIDE 100 MCG: 10 INJECTION INTRAVENOUS at 10:46

## 2021-03-09 RX ADMIN — PROPOFOL 50 MCG/KG/MIN: 10 INJECTION, EMULSION INTRAVENOUS at 08:45

## 2021-03-09 RX ADMIN — LIDOCAINE HYDROCHLORIDE 100 MG: 20 INJECTION, SOLUTION INFILTRATION; PERINEURAL at 08:22

## 2021-03-09 RX ADMIN — CEFAZOLIN 1 G: 1 INJECTION, POWDER, FOR SOLUTION INTRAMUSCULAR; INTRAVENOUS at 10:26

## 2021-03-09 RX ADMIN — FENTANYL CITRATE 50 MCG: 50 INJECTION, SOLUTION INTRAMUSCULAR; INTRAVENOUS at 11:03

## 2021-03-09 RX ADMIN — SODIUM CHLORIDE, POTASSIUM CHLORIDE, SODIUM LACTATE AND CALCIUM CHLORIDE: 600; 310; 30; 20 INJECTION, SOLUTION INTRAVENOUS at 17:20

## 2021-03-09 RX ADMIN — ROCURONIUM BROMIDE 20 MG: 10 INJECTION INTRAVENOUS at 09:36

## 2021-03-09 ASSESSMENT — MIFFLIN-ST. JEOR: SCORE: 1598.19

## 2021-03-09 NOTE — OR NURSING
Bedside glucose machine unable to scan pt's barcode, override used, bedside glucose= 93, RN notified.

## 2021-03-09 NOTE — ANESTHESIA PROCEDURE NOTES
Airway   Date/Time: 3/9/2021 8:25 AM   Patient location during procedure: OR  Staff -   CRNA: Daphnie Acosta APRN CRNA  Performed By: CRNA    Consent for Airway   Urgency: elective    Indications and Patient Condition  Indications for airway management: ioana-procedural  Induction type:intravenousMask difficulty assessment: 2 - vent by mask + OA or adjuvant +/- NMBA    Final Airway Details  Final airway type: endotracheal airway  Successful airway:ETT - single and Nasal  Endotracheal Airway Details   ETT size (mm): 7.5  Cuffed: yes  Successful intubation technique: direct laryngoscopy  Grade View of Cords: 1  Adjucts: stylet (r nares dilated with nasal trumpets 30-32-34)  Measured from: nares  Secured at (cm): 28  Secured with: sutures (per Dr. Cardenas)  Bite block used: None    Post intubation assessment   Placement verified by: capnometry, equal breath sounds and chest rise   Number of attempts at approach: 1  Number of other approaches attempted: 0  Secured with:sutures (per Dr. Cardenas)  Ease of procedure: easy  Dentition: Intact and Unchanged

## 2021-03-10 ENCOUNTER — PATIENT OUTREACH (OUTPATIENT)
Dept: CARE COORDINATION | Facility: CLINIC | Age: 26
End: 2021-03-10

## 2021-03-10 VITALS
HEIGHT: 71 IN | SYSTOLIC BLOOD PRESSURE: 128 MMHG | BODY MASS INDEX: 23.77 KG/M2 | RESPIRATION RATE: 20 BRPM | DIASTOLIC BLOOD PRESSURE: 76 MMHG | WEIGHT: 169.75 LBS | HEART RATE: 87 BPM | OXYGEN SATURATION: 96 % | TEMPERATURE: 97.5 F

## 2021-03-10 PROCEDURE — G0378 HOSPITAL OBSERVATION PER HR: HCPCS

## 2021-03-10 PROCEDURE — 250N000009 HC RX 250: Performed by: PLASTIC SURGERY

## 2021-03-10 PROCEDURE — 250N000011 HC RX IP 250 OP 636: Performed by: PLASTIC SURGERY

## 2021-03-10 PROCEDURE — 250N000013 HC RX MED GY IP 250 OP 250 PS 637: Performed by: PLASTIC SURGERY

## 2021-03-10 RX ORDER — CHLORHEXIDINE GLUCONATE ORAL RINSE 1.2 MG/ML
15 SOLUTION DENTAL 2 TIMES DAILY
Qty: 473 ML | Refills: 0 | Status: SHIPPED | OUTPATIENT
Start: 2021-03-10 | End: 2021-04-27

## 2021-03-10 RX ORDER — GINSENG 100 MG
CAPSULE ORAL 2 TIMES DAILY
Qty: 453.9 G | Refills: 0 | Status: SHIPPED | OUTPATIENT
Start: 2021-03-10 | End: 2021-04-27

## 2021-03-10 RX ORDER — IBUPROFEN 100 MG/5ML
5 SUSPENSION, ORAL (FINAL DOSE FORM) ORAL EVERY 6 HOURS
Qty: 473 ML | Refills: 1 | Status: SHIPPED | OUTPATIENT
Start: 2021-03-10 | End: 2021-04-27

## 2021-03-10 RX ORDER — OXYCODONE HCL 5 MG/5 ML
5 SOLUTION, ORAL ORAL EVERY 6 HOURS PRN
Qty: 75 ML | Refills: 0 | Status: SHIPPED | OUTPATIENT
Start: 2021-03-10 | End: 2021-04-27

## 2021-03-10 RX ORDER — ACETAMINOPHEN 160 MG
TABLET,DISINTEGRATING ORAL 2 TIMES DAILY
Qty: 473 ML | Refills: 1 | Status: SHIPPED | OUTPATIENT
Start: 2021-03-10 | End: 2021-04-27

## 2021-03-10 RX ORDER — ACETAMINOPHEN 325 MG/10.15ML
650 LIQUID ORAL EVERY 6 HOURS
Qty: 473 ML | Refills: 1 | Status: SHIPPED | OUTPATIENT
Start: 2021-03-10 | End: 2021-04-27

## 2021-03-10 RX ADMIN — ACETAMINOPHEN 650 MG: 325 SOLUTION ORAL at 06:45

## 2021-03-10 RX ADMIN — IBUPROFEN 400 MG: 200 SUSPENSION ORAL at 10:11

## 2021-03-10 RX ADMIN — CEFAZOLIN 1 G: 1 INJECTION, POWDER, FOR SOLUTION INTRAMUSCULAR; INTRAVENOUS at 07:42

## 2021-03-10 RX ADMIN — ACETAMINOPHEN 650 MG: 325 SOLUTION ORAL at 00:48

## 2021-03-10 RX ADMIN — IBUPROFEN 400 MG: 200 SUSPENSION ORAL at 04:02

## 2021-03-10 RX ADMIN — BACITRACIN: 500 OINTMENT TOPICAL at 10:00

## 2021-03-10 RX ADMIN — CHLORHEXIDINE GLUCONATE 0.12% ORAL RINSE 15 ML: 1.2 LIQUID ORAL at 12:44

## 2021-03-10 RX ADMIN — HYDROGEN PEROXIDE: 2.65 LIQUID TOPICAL at 12:44

## 2021-03-10 RX ADMIN — ACETAMINOPHEN 650 MG: 325 SOLUTION ORAL at 12:39

## 2021-03-10 NOTE — OR NURSING
This writer spoke with Dr. Block regarding pts HTN and tachycardia.  No interventions at this time.  Patient okay to head upstairs to inpatient unit.

## 2021-03-10 NOTE — PLAN OF CARE
Pt slow, face is very swollen, pt said pain was only 2. Able to swallow pain meds & soft food. Pt has a friend that will be taking care of her. Nursing showed friend how to do wound care. Wound care supplies sent with meds. Scalp incision intact with staples. Pt & friend are aware of appointments & what to do if signs of infection.

## 2021-03-10 NOTE — PLAN OF CARE
A&O x4. CMS and Neuros intact. VSS. Pt pain well managed overnight with scheduled Tylenol, Ibuprofen, and ice to face. Lung sounds clear and equal bilateral. Pt ambulating with SBA to bathroom. Pt voiding well. PVR was 0. Pt slept majority of shift. Scheduled medications administered as ordered including IV antibiotics. IV patent and infusing fluids at 75 mL/hr. Pt weaned off O2 overnight and now on room air. Call light within reach and pt able to make needs known.    3599-5030  po intake: in progress  pain control: completed  drain and dressing removal: incomplete    8889-3977  po intake: in progress  pain control: completed  drain and dressing removal: incomplete    4291-4742  po intake: in progress  pain control: completed  drain and dressing removal: incomplete    4759-3339  po intake: in progress  pain control: completed  drain and dressing removal: incomplete

## 2021-03-10 NOTE — DISCHARGE INSTRUCTIONS
Care Instructions after Facial Gender Confirmation Surgery    Diet    If you had jaw surgery, start with a liquid diet for 2 weeks to allow your incisions time to heal. You can then advance to soft (non-chewing) food for 2 weeks, and then resume your regular diet as tolerated. Premature chewing can lead to wound breakdown and infection.    If you did not have jaw surgery, resume your normal diet as tolerated    Medicines    Once you get home from surgery, start taking acetaminophen (Tylenol) every 6 hours on schedule. Do not wait until you are in pain to start taking acetaminophen. This allows you to stay on top of your pain control.    If after the first day acetaminophen is not sufficient, start taking ibuprofen (Motrin) every 6 hours on a schedule also. Stagger them 3 hours apart so that every 3 hours you are taking some pain medicine.    For example:   o 8AM: Acetaminophen  o 11AM: Ibuprofen.   o 2PM: Acetaminophen.   o 5PM: Ibuprofen.    Do not start ibuprofen too early since it may increase your risk of bleeding    A small narcotic prescription will be provided. Use this only in case of emergency after you have tried the other pain medicine    If you need to take a narcotic, do not do so on an empty stomach as it can cause nausea.     Narcotics can also cause constipation, so take a stool softener if you are taking narcotics.    Activities    Elevate your head at all times to minimize swelling and pain    Do not lay flat when sleeping. Either sleep in a recliner or stack several pillows. Laying flat will greatly increase your swelling    Do not bend over to pick anything up. This can cause increased pressure to your surgical site and increase risk of bleeding    Do not lift anything over 5 lbs for 4 weeks after surgery    Avoid straining or bearing down. If you feel constipated, try a stool softener or laxative    After forehead surgery, do NOT blow your nose or sneeze if you can avoid it. This can cause  back-pressure into your forehead sinus cavity that could lead to infection or poor bone healing.    Start walking as soon as you can to minimize risk of blood clots in your legs. (But avoid moderate/vigorous exercise!)    Care of the Surgical Area    Keep your incision clean at all times    If you had forehead surgery,  o Keep head wrap on until follow-up. We will take off your wrap in clinic and show you how to do the wound care below.  o Your swelling will worsen in the first week and will take several weeks to resolve.  o Wipe your incision twice daily with a Q-tip dipped in half-strength peroxide diluted with water to clean off all clots and debris.   o After your incision is clean, apply a thin layer of antibiotic ointment (Bacitracin or Neosporin)  o After the first 2-3 days, you may switch to regular ointment (Aquaphor or Vaseline). Continued use of antibiotic ointment can cause skin reactions and redness  o You will have staples along your scalp incision that will be removed in 2 weeks  o You may have some temporary numbness or paralysis of either side of your forehead. This usually resolves in several weeks  o Empty your drain before you go to bed and reset the suction. Keep drain on suction until follow-up.  o Monitor your daily drain output. Once it slows down and is less than 30-40mL please contact clinic to set up visit for removal.  o For your brow lift, you will have two temporary fixation devices underneath your scalp.   - These will become more prominent as your swelling improves.  - These devices can take 6 months to 1 year to dissolve.   - They are often tender and irritating as they dissolve. This is normal.  - Do NOT massage your scalp around these devices, as this can dislodge your scalp and change the shape of your brow.    If you had lip lift surgery,   o Your lip will appear overly tight/short for the first weeks. This will slowly stretch out in time.  o Wipe your incision twice daily as  needed with a Q-tip dipped in half-strength peroxide diluted with water to clean off all clots and debris.   o After your incision is clean, apply a thin layer of antibiotic ointment (Bacitracin or Neosporin)  o After the first 2-3 days, you may switch to regular ointment (Aquaphor or Vaseline). Continued use of antibiotic ointment can cause skin reactions and redness    If you had jaw surgery,   o Rinse your mouth as prescribed with Peridex mouthwash for 1 week.  o After 1 week, switch to over the counter mouthwash like Listerine or Scope. Continued Peridex use can stain your teeth.  o Do not brush your teeth for 2 weeks or until your oral incisions have healed  o There will be some swelling of your jaw which peaks around 48-72 hrs after surgery;   o Apply jaw bra with ice to minimizing pain/swelling as often as possible (or needed)    If you had an Dakota's Apple reduction (trach shave), please remove your outer dressing after 2 days. There will be skin glue on the incision that should remain until follow-up.  o There will be some swelling of your neck. Please call the plastic surgery clinic if you notice any sudden increased swelling, bleeding, air bubbling, or difficulty breathing  o Avoid looking up as much as you can since this stretch the incision and widen your scar.  o You will need to tape your incision for 3 months after surgery. We will give you instructions and supplies at your follow-up visit.    Follow-up Doctor Visit    About 1 week after the surgery. If you had forehead surgery then we will follow-up the next day for drain removal.     Follow-up will be at the 4th Floor, Pod 4K, [facility], located at [address] [place] [address].       You may call [phone] to confirm your appointment    If there is a Problem    If there is a problem, you should call the Plastic Surgery office at [phone]     If you need to contact us after-hours, please call  the [facility] at [phone] and page the plastic surgery service    If you need to go to the ER, please go to [facility]    Signs that indicate calling the nurse or doctor include:  o Sudden swelling, pain, bruising  o Bleeding which is persistent  o Pain which is not relieved by pain medicines  o Loss of appetite, nausea or vomiting which is persistent  o Fever above 101.3F  o Difficulty breathing    If you have questions or concerns you should call the nurse or doctor.

## 2021-03-10 NOTE — OR NURSING
PACU to Inpatient Nursing Handoff    Patient Ilan Baziz is a 25 year old adult who speaks English.   Procedure Procedure(s):  Recontouring of forehead, fronto-orbital bar, Ostectomy, Thyroid reduction chondroplasty  Recontouring Of Mandible And Chin  Bilateral Browpexy   Surgeon(s) Primary: Shayla Cardenas MD  Resident - Assisting: Donna Sanchez MD     Allergies   Allergen Reactions     Wellbutrin [Bupropion] Swelling     Pruritus and edema to feet       Past Medical History   has a past medical history of Anxiety.    Anesthesia General   Dermatome Level     Preop Meds Not applicable   Nerve block Not applicable   Intraop Meds dexamethasone (Decadron)  fentanyl (Sublimaze): 250 mcg total  hydromorphone (Dilaudid): 2 mg total  ondansetron (Zofran): last given at 1725   Local Meds Yes   Antibiotics cefazolin (Ancef) - last given at 1625     Pain Patient Currently in Pain: sleeping, patient not able to self report   PACU meds  hydromorphone (Dilaudid): 1 mg (total dose) last given at 1900 , Tylenol 650mg at 1950  Oxycodone 5mg PO at 1942   PCA / epidural No   Capnography     Telemetry ECG Rhythm: Sinus tachycardia   Inpatient Telemetry Monitor Ordered? No        Labs Glucose No results found for: GLC    Hgb Lab Results   Component Value Date    HGB 15.0 03/08/2021       INR No results found for: INR   PACU Imaging Not applicable     Wound/Incision Incision/Surgical Site 03/09/21 Neck (Active)   Incision Assessment UTV 03/09/21 1817   Closure Approximated;Sutures;Liquid bandage 03/09/21 1817   Incision Care Medication applied - see MAR 03/09/21 1721   Dressing Intervention Clean, dry, intact 03/09/21 1817   Number of days: 0       Incision/Surgical Site 03/09/21 Scalp (Active)   Incision Assessment UTV 03/09/21 1817   Nerissa-Incision Assessment UTV 03/09/21 1817   Incision Drainage Amount Moderate 03/09/21 1817   Drainage Description Sanguinous 03/09/21 1817   Incision Care Medication applied - see MAR 03/09/21 1721    Dressing Intervention Moist drainage 03/09/21 1817   Number of days: 0       Incision/Surgical Site 03/09/21 Mouth (Active)   Incision Assessment UTV 03/09/21 1817   Nerissa-Incision Assessment UTV 03/09/21 1817   Closure Approximated;Sutures 03/09/21 1817   Dressing Intervention Open to air / No Dressing 03/09/21 1817   Number of days: 0      CMS        Equipment ice pack   Other LDA       IV Access Peripheral IV 03/09/21 Right;Dorsal Hand (Active)   Site Assessment [facility] 03/09/21 1817   Line Status Infusing;Checked every 1-2 hour 03/09/21 1817   Phlebitis Scale 0-->no symptoms 03/09/21 1817   Infiltration Scale 0 03/09/21 1817   Infiltration Site Treatment Method  None 03/09/21 1817   Number of days: 0       Peripheral IV 03/09/21 Left Hand (Active)   Site Assessment [facility] 03/09/21 1817   Line Status Saline locked 03/09/21 1817   Phlebitis Scale 0-->no symptoms 03/09/21 1817   Infiltration Scale 0 03/09/21 1817   Infiltration Site Treatment Method  None 03/09/21 1817   Number of days: 0      Blood Products Not applicable    mL   Intake/Output Date 03/09/21 0700 - 03/10/21 0659   Shift 3944-8686 6192-2177 5438-6501 24 Hour Total   INTAKE   I.V. 1400 800  2200   Shift Total(mL/kg) 1400(18.18) 800(10.39)  2200(28.57)   OUTPUT   Urine 800 100  900   Blood 130 300  430   Shift Total(mL/kg) 930(12.08) 400(5.19)  1330(17.27)   Weight (kg) 77 77 77 77      Drains / Irizarry Closed/Suction Drain 1 Left Scalp Bulb 10 Citizen of [place] (Active)   Site Description [facility] 03/09/21 1817   Drainage Appearance Bloody/Bright Red 03/09/21 1817   Status To bulb suction 03/09/21 1817   Number of days: 0      Time of void PreOp Void Prior to Procedure: 0630 (03/09/21 0658)    PostOp      Diapered? No   Bladder Scan     PO    tolerating sips     Vitals    B/P: (!) 157/95  T: 97.9  F (36.6  C)    Temp src: Axillary  P:  Pulse: 118 (03/09/21 1830)          R: 19  O2:  SpO2: 98 %    O2 Device: Simple face mask(blow by) (03/09/21 1830)    Oxygen  Delivery: 6 LPM (03/09/21 1830)         Family/support present significant other   Patient belongings  1 patient belongings bag sent upstairs with patient   Patient transported on Cart   DC meds/scripts (obs/outpt) Not applicable   Inpatient Pain Meds Released? Yes       Special needs/considerations None   Tasks needing completion None       Pat Trinidad, RN  ASCOM [id number]

## 2021-03-10 NOTE — OP NOTE
PLASTIC SURGERY OPERATIVE NOTE    DATE OF SURGERY: 03/09/21    PREOPERATIVE DIAGNOSIS: Facial Gender Dysphoria.     POSTOPERATIVE DIAGNOSIS: Same     PROCEDURE:  1. Anterior frontal sinus osteotomy and setback  2. Bony recontouring of forehead, fronto-orbital bar  3. Bony recontouring of naso-frontal junction  4. Bony recontouring and ostectomy of orbits, bilateral  5. Bony recontouring of bilateral anterior temporal lines  5. Open repair of bilateral brow ptosis with forehead Endotine  6. Elevation of bi-pedicled pericranial flap with release and decompression of bilateral supra-orbital neurovascular bundles  7. Ostectomy and contouring of mandible and chin  8. Thyroid reduction chondroplasty     SURGEON: Shayla Cardenas M.D.     ASSISTANT: Donna Sanchez MD, PGY-4    ANESTHESIA: General     IV FLUIDS: Per anesthesia record     ESTIMATED BLOOD LOSS: 150 mL.     INDICATION FOR PROCEDURE:  Ilan Bazzi is a very pleasant 25 year old trans-lady with history of facial gender dysphoria who presented for evaluation for facial gender confirmation surgery. Focused discussion was completed regarding sources of gender dysphoria derived from the patient s appearance which include the shape of the forehead, prominence of the brow ridge, acute nasofrontal transition, orbital hooding, brow ptosis, shape of the nose, prominence chin and mandibular angles, and thyroid cartilage prominence. After a thorough discussion of the risks of benefits of surgery, the former of which include bleeding, infection, suboptimal bone healing, alopecia, numbness, paresthesias, patient regret, need for revision, and dissatisfaction with the aesthetic outcome, the patient subsequently gave their consent for the above stated procedures.     DESCRIPTION OF PROCEDURE:  The patient presented to the preoperative holding area on March 9, 2021. The patient was evaluated by myself and anesthesia and the consent, patient identification, and site were confirmed per  hospital protocol. The patient was taken to the operating room and laid supine on the operating room table. A second verification of the consent, patient identification, and site was conducted per hospital protocol. The patient was then induced under general anesthesia and nasotracheally intubated. The ET tube was secured with transseptal 2-0 silk suture. They were positioned onto a horseshoe with shoulder roll.    Preoperative antibiotics were administered. Corneal shields were placed. A 1-2cm strip of hair was clipped in bicoronal fashion in anticipation of some redundancy to be excised from the brow ptosis repair. The anterior hair was arranged into three hoang. Local anesthesia was infiltrated in the form of 0.25% bupivacaine with epinephrine, 1:100,000, along the intended incision, subgaleal plane, and about the supra-orbital bundles. Moments elapsed for vasoconstriction.     The entire scalp, including hair, face, neck, and oral cavity were prepped with betadine solution. The posterior hair was excluded with sterile towel and staples. The face was then draped in usual sterile fashion with split drape.      The forehead procedure began with incising the posterior edge of the clipped bicoronal segment with #15 blade scalpel in beveled fashion, followed by bipolar cautery hemostasis. Dissection continued with monopolar cautery down to the subgaleal plane. This extended anteriorly until approximately 1cm superior to the superior orbital rims.     At this point, we began elevating the pericranial flap. A wide, inferiorly-based rectangular flap was marked extending laterally to the anterior temporal line bilaterally. This was then incised with electrocautery. A #9 elevator was used to elevate this flap down to its bilateral supra-orbital pedicles.     Dissection continued into the orbit and onto the orbital roof bilaterally. The supraorbital neurovascular bundles were released from their foraminal attachments  circumferentially. Both bundles exited through intra-osseous tunnels which required osteotomies and release of their attachments. Exposure continued laterally from the orbital roof onto the bilaterally zygomatico-frontal sutures.     At this point, we secured the anterior frontal sinus cutting guide onto the fronto-orbital bar. A sterile pencil was used to brittanie-off the boundaries of the frontal sinus. The frontal bone osteotomy was designed. The remnant of the forehead was gridded off to guide contouring.     We then proceeded with bony contouring of the forehead with powered bur and irrigation, converting the flattened, concave forehead to a gentle convex shape with the bony nasion as reference.      Attention was directed to the anterior frontal sinus osteotomy and setback. A frontal bone flap was designed. This was incised circumferentially with piezo-electric bone saw. The bone flap was then lifted and the sinus mucosa was carefully detached. Any prominent sinus septum was burred down.   The bony nasofrontal junction was carefully contoured to smoothen its transition and minimize its acuity. Irrigation was used to minimize bony resorption. The nasion and glabella were used as reference.    The bilateral supra-orbital rims caudal to the frontal sinus bone flap were also contoured and burred bilaterally to minimize their prominence. This was performed in stepwise fashion in order to minimize any bony defect inferiorly once we re-apply the bone flap.     Once recontouring of the naso-frontal junction and supra-orbital rims was completed, we proceeded to inset the frontal sinus bone flap, making sure to seal off any caudal defects. The bone flaps were then secured to the adjacent intact frontal bone with a pair of thin dog-bone plates.     We then proceeded with the lateral ostectomy and contouring of the orbital roofs bilaterally. The cutting guide was again used to brittanie off symmetric excisions of the  supero-lateral orbit. The excess bone was excised with piezo-electric bone saw and powered bur. The anterior fullness was also burred to make a smooth transition to the zygomaticofrontal sutures.     The anterior temporal lines were examined and noted to be relatively prominent. Electrocautery was used to minimally release the anterior insertion of the temporalis muscle. This was then sharply elevated with #9 elevator. The exposed ridge was then contoured with isela until smooth transition was achieved.    At this point, we proceeded with re-suspension of the pericranial flap in order to provide coverage to the anterior frontal sinus setback hardware. The flap was resuspended to the free edge of the temporalis fascia with interrupted 3-0 Vicryl suture.     Open repair of bilateral brow ptosis was performed. The lateral third of the hairy brow is identified and corresponding points on the frontal bone marked. Powered drill was used to create a partial thickness bony defect. Endotine Forehead devices were then interposed into this defect and secured. Symmetric manual traction was placed on the anterior scalp and the soft tissue was fixed onto the Endotine device.     A 10 Fr round channel drain was placed underneath the scalp flap and exited above ear. This was secured with 3-0 Vicryl suture. The galea was then closed with interrupted 3-0 Vicryl suture. Skin closure was completed with staples.     Attention was directed toward the thyroid reduction chondroplasty. A 2.5cm transverse incision was marked about the cervicomental angle. The hyoid bone, laryngeal prominence, and cricoid cartilage were previously marked. The incision and intended area of dissection was infiltrated with 0.25% bupivacaine with epinephrine. Moments elapsed for vasoconstriction. The incision was then made with a #15 blade scalpel. Dissection deepened through the subcutaneous tissue until the platysma was encountered. We then directed the  dissection caudally until the laryngeal prominence was palpated. The subplatysmal plane was entered through a transverse incision. The median raphe of the infrahyoid strap muscles was then incised with electrocautery. The paired muscles were then lateralized with #9 elevator and retracted. The laryngeal prominence was visualized. The perichondrium was incised. A subperichondrial dissection was then performed to expose the superior and posterior borders of the lateral cartilage. A line 5mm caudal to the prominence was marked as the extent of our excision. The cartilage was then carefully shaved until the prominence was minimized. Hemostasis was achieved. The strap muscles were medialized and the platysma re-approximated with 4-0 PDS suture. Skin was then closed with buried 3-0 Monocryl followed by 4-0 Monocryl. This was eventually dressed with Exofin glue and Primapore.    We then directed attention toward the mandible and chin. Local anesthesia was infiltrated in the form of 0.25% bupivacaine with epinephrine, 1:100,000, along the intended lower gingivobuccal incisions. Moments elapsed for vasoconstriction. A moistened throat pack was placed.    The mucosal incision was made with electrocautery centrally, from canine to canine, leaving a healthy mucosal cuff. This deepened through the mentalis muscle until the periosteum was encountered. This was then incised. A #9 elevator was used to develop a subperiosteal plane exposing the entire buccal surface of the parasymphyeal region bilaterally laterally toward the body. The mental nerve was visualized and protected.     Two additional incisions were made distal to the mental nerve, continuing along the oblique ridge. This was carried down to the bone, bilaterally. The buccal surface of the distal body, angle, and ascending ramus were exposed. The inferior border was then exposed from angle to angle.    At this point, the custom cutting guides were interposed onto the  inferior border and secured with screws. Piezo ultrasonic bone saws were used to completed bicortical cuts about the prominent angles, transitioning into the body. Once this ostectomy was completed, the cutting guide was removed. Prominent portions of the mandibular body and menton were then contoured with bur. Both sides were checked for symmetry.     The pockets were irrigated with saline. The mentalis muscle was resuspended with 3-0 PDS suture. Final mucosal closure was then completed with simple running 3-0 chromic suture.     The wounds were washed and dried. The scalp incision was dressed with bacitracin, telfa strips, kerlix wrap, and trey. Corneal shields were removed. A jaw bra with ice was applied. The patient was extubated and taken to the PACU under stable condition. All sponge, needle, and instrument counts were accurate at the end of the procedure. I was present and scrubbed for the entire case.

## 2021-03-10 NOTE — ANESTHESIA CARE TRANSFER NOTE
Patient: Ilan Bazzi    Procedure(s):  Recontouring of forehead, fronto-orbital bar, Ostectomy, Thyroid reduction chondroplasty  Recontouring Of Mandible And Chin  Bilateral Browpexy    Diagnosis: Gender dysphoria in adolescent and adult [F64.0]  Diagnosis Additional Information: No value filed.    Anesthesia Type:   General     Note:      Level of Consciousness: awake and drowsy  Oxygen Supplementation: blow-by O2  Level of Supplemental Oxygen (L/min / FiO2): 6  Independent Airway: airway patency satisfactory and stable  Dentition: dentition unchanged  Vital Signs Stable: post-procedure vital signs reviewed and stable  Report to RN Given: handoff report given  Patient transferred to: PACU    Handoff Report: Identifed the Patient, Identified the Reponsible Provider, Reviewed the pertinent medical history, Discussed the surgical course, Reviewed Intra-OP anesthesia mangement and issues during anesthesia, Set expectations for post-procedure period and Allowed opportunity for questions and acknowledgement of understanding      Vitals: (Last set prior to Anesthesia Care Transfer)  CRNA VITALS  3/9/2021 1745 - 3/9/2021 1827      3/9/2021             Pulse:  121    SpO2:  97 %    Resp Rate (observed):  (!) 1        Electronically Signed By: SCOTT White CRNA  March 9, 2021  6:27 PM

## 2021-03-10 NOTE — ANESTHESIA POSTPROCEDURE EVALUATION
Patient: Ilan Bazzi    Procedure(s):  Recontouring of forehead, fronto-orbital bar, Ostectomy, Thyroid reduction chondroplasty  Recontouring Of Mandible And Chin  Bilateral Browpexy    Diagnosis:Gender dysphoria in adolescent and adult [F64.0]  Diagnosis Additional Information: No value filed.    Anesthesia Type:  General    Note:  Disposition: Admission   Postop Pain Control: Uneventful            Sign Out: Well controlled pain   PONV: No   Neuro/Psych: Uneventful            Sign Out: Acceptable/Baseline neuro status   Airway/Respiratory: Uneventful            Sign Out: Acceptable/Baseline resp. status   CV/Hemodynamics: Uneventful            Sign Out: Acceptable CV status   Other NRE: NONE   DID A NON-ROUTINE EVENT OCCUR? No         Last vitals:  Vitals:    03/09/21 1915 03/09/21 1930 03/09/21 2011   BP: (!) 168/95 (!) 170/97    Pulse: 132 125    Resp: 17 16    Temp: 36.8  C (98.2  F)     SpO2: 93% 97% 97%       Last vitals prior to Anesthesia Care Transfer:  CRNA VITALS  3/9/2021 1745 - 3/9/2021 1845      3/9/2021             SpO2:  99 %    Resp Rate (observed):  18    EKG:  Sinus rhythm          Electronically Signed By: Brad Block MD  March 9, 2021  8:37 PM

## 2021-03-10 NOTE — PLAN OF CARE
Pt  arrived on unit from PACU by cart  at 20:40  Alert and oriented x4.  Elevated BP, otherwise VSS and afebrile  and capno WNL. Sats 99% on 2L oxygen via NC.  Denies n/v, reflux, CP or SOB.  Lungs clear  and BS hypoactive. No flatus yet. LBM 3/7  Dressing to head -- moderate drainage. Dressing to chin CDI,  MAGDALENE patent with small amt of serosang drainage.  Jaw bra and ice applied.  Due to void. Bladder scan : 50ml at 23:00  Tolerates water and no swallowing difficulty with mouth & lips swelling . IVF infusing @ 75ml/hr.  Instructed how to use IS with couging.   PCD's  on for DVT ppx.  Turned and repositioned pt every 2hrs .  Pain managed with current regimens : scheduled Tylenol and Ibuprofen.  Prn oxycodone 5-10mg q 4hrs  Will continue to monitor with plan of care. Call light in reach.           Observation goals PER UNIT ROUTINE     Comments: Discharge pending po intake, pain control, drain and dressing removal        Pt is observation status. Observation brochure given to pt.  Observation goals reviewed with pt.  Monitored pt with observation goals every 1-2 hrs.    20:40  has not met all Observation goals yet  22:40  has not met all Observation goals yet

## 2021-03-10 NOTE — PROGRESS NOTES
PLASTIC SURGERY INPATIENT NOTE     SUBJECTIVE  No overnight events. Pain controlled. Got OOB. Able to drink. No other complaints.    PHYSICAL EXAM  No acute distress  Regular  Nonlabored  FOREHEAD edematous, bilateral periorbital ecchymosis, edema, scalp incision c/d/i with staples in place, symmetric brow motion, intact but decreased forehead sensibility  NECK dressing c/d/i, no drainage  INTRAORAL incisions intact  Warm, well-perfused    ASSESSMENT/PLAN  25 year old transgender lady with gender dysphoria status post forehead and frontoorbital contouring, brow pexy, mandible/chin contouring, thyroid reduction chondroplasty 3/9, no new acute issues  - po pain meds  - full liquid diet  - int fluids once taking po  - oob, ambulate  - mechanical dvt ppx  - drain to bulb suction, will discharge with drain, patient advised to contact clinic once output below 30-40 for removal  - Wound care: clean scalp incision bid with 1/2 strength peroxide and Q-tip, then apply thin layer of ointment  - plan for discharge today  - page prs service on-call pager if any questions    Shayla Cardenas MD  Pediatric Cleft and Craniofacial Surgery  Division of Plastic Surgery  Pager: [phone]

## 2021-03-10 NOTE — BRIEF OP NOTE
[facility]    Brief Operative Note    Pre-operative diagnosis: Gender dysphoria in adolescent and adult [F64.0]  Post-operative diagnosis Same as pre-operative diagnosis    Procedure: Procedure(s):  Recontouring of forehead, fronto-orbital bar, Ostectomy, Thyroid reduction chondroplasty  Recontouring Of Mandible And Chin  Bilateral Browpexy  Surgeon: Surgeon(s) and Role:     * Shayla Cardenas MD - Primary     * Donna Sanchez MD - Resident - Assisting  Anesthesia: General   Estimated blood loss: 150 ml  Drains: Aden-Lopez  Specimens: * No specimens in log *  Findings:   None.  Complications: None.  Implants:   Implant Name Type Inv. Item Serial No.  Lot No. LRB No. Used Action   KLS Jeffy 1.5 x 4mm Neuro Screws Metallic Hardware/Jet   KLS JEFFY   7 Implanted   KLS Jeffy Neuro 4 Hole Straight Plate  Metallic Hardware/Jet   KLS JEFFY   2 Implanted   KLS Jeffy Neuro 1.8 x 4mm Emergency Screws Metallic Hardware/Jet   KLS JEFFY  N/A 1 Implanted   KLS Jeffy 1.5 x 4mm Neuro Screws  Metallic Hardware/Jet   KLS JEFFY  N/A 1 Implanted and Explanted   IMP DEVICE BROW FXATION ENDOTINE 3.0MM BIOABSRB [id number] Metallic Hardware/Jet IMP DEVICE BROW FXATION ENDOTINE 3.0MM BIOABSRB [id number]  [employer] I [id number] N/A 1 Implanted

## 2021-03-10 NOTE — PROGRESS NOTES
The patient is scheduled for clinic follow up within 24-48 hours of hospital discharge. No post-hospital call is needed at this time.        Eve Pillai CMA,  Post Hospital Discharge Team

## 2021-03-10 NOTE — DISCHARGE SUMMARY
Physician Discharge Summary     Patient ID:  Ilan Bazzi  [id number]  25 year old  1995    Admit date: 3/9/2021    Discharge date and time: 3/10/2021     Admitting Physician: Shayla Cardenas MD     Discharge Physician: Shayla Cardenas    Admission Diagnoses: Gender dysphoria in adolescent and adult [F64.0]    Discharge Diagnoses: Gender Dysphoria    Admission Condition: good    Discharged Condition: good    Indication for Admission: Postoperative monitoring    Hospital Course: Patient underwent facial gender confirmation surgery including forehead recontouring, brow pexy, mandible/chin contouring, thyroid reduction chondroplasty 3/9. Admitted for overnight monitoring, pain control. Pt clinically improving and deemed stable for discharge 3/10/21.    Consults: none      Treatments: IV hydration    Discharge Exam:  No acute distress  Regular  Nonlabored  FOREHEAD edematous, bilateral periorbital ecchymosis, edema, scalp incision c/d/i with staples in place, symmetric brow motion, intact but decreased forehead sensibility  NECK dressing c/d/i, no drainage  INTRAORAL incisions intact  Warm, well-perfused    Disposition: home    Patient Instructions:   Current Discharge Medication List      START taking these medications    Details   acetaminophen (TYLENOL) 325 MG/10.15ML solution Take 20.3 mLs (650 mg) by mouth every 6 hours  Qty: 473 mL, Refills: 1    Associated Diagnoses: Gender dysphoria in adolescent and adult      bacitracin 500 UNIT/GM OINT Apply topically 2 times daily  Qty: 453.9 g, Refills: 0    Associated Diagnoses: Gender dysphoria in adolescent and adult      chlorhexidine (PERIDEX) 0.12 % solution Swish and spit 15 mLs in mouth 2 times daily  Qty: 473 mL, Refills: 0    Associated Diagnoses: Gender dysphoria in adolescent and adult      hydrogen peroxide 3 % solution Apply topically 2 times daily  Qty: 473 mL, Refills: 1    Associated Diagnoses: Gender dysphoria in adolescent and adult      ibuprofen  (ADVIL/MOTRIN) 100 MG/5ML suspension Take 20 mLs (400 mg) by mouth every 6 hours  Qty: 473 mL, Refills: 1    Associated Diagnoses: Gender dysphoria in adolescent and adult      oxyCODONE (ROXICODONE) 5 MG/5ML solution Take 5 mLs (5 mg) by mouth every 6 hours as needed for breakthrough pain or severe pain  Qty: 75 mL, Refills: 0    Associated Diagnoses: Gender dysphoria in adolescent and adult         CONTINUE these medications which have NOT CHANGED    Details   amphetamine-dextroamphetamine (ADDERALL) 10 MG tablet Take 10 mg by mouth 2 times daily as needed      estradiol (ESTRACE) 2 MG tablet Take 7 mg by mouth 2 times daily       finasteride (PROPECIA) 1 MG tablet Take 1 mg by mouth every evening       FLUoxetine (PROZAC) 20 MG capsule Take 40 mg by mouth every morning       Multiple Vitamins-Minerals (MULTIVITAL PO) Take by mouth every morning       PROGESTERONE MICRONIZED PO Take 10 mg by mouth every evening            Activity: per discharge instructions  Diet: liquid diet x 2 weeks  Wound Care: as directed    Follow-up with plastic surgery clinic for drain removal.    Signed:  Shayla Cardenas MD  3/10/2021  7:49 AM

## 2021-03-12 ENCOUNTER — OFFICE VISIT (OUTPATIENT)
Dept: PLASTIC SURGERY | Facility: CLINIC | Age: 26
End: 2021-03-12
Payer: COMMERCIAL

## 2021-03-12 VITALS
WEIGHT: 165 LBS | HEIGHT: 70 IN | DIASTOLIC BLOOD PRESSURE: 71 MMHG | OXYGEN SATURATION: 98 % | HEART RATE: 78 BPM | BODY MASS INDEX: 23.62 KG/M2 | SYSTOLIC BLOOD PRESSURE: 131 MMHG

## 2021-03-12 DIAGNOSIS — F64.0 GENDER DYSPHORIA IN ADOLESCENT AND ADULT: Primary | ICD-10-CM

## 2021-03-12 PROCEDURE — 99024 POSTOP FOLLOW-UP VISIT: CPT | Performed by: PLASTIC SURGERY

## 2021-03-12 ASSESSMENT — MIFFLIN-ST. JEOR: SCORE: 1568.69

## 2021-03-12 ASSESSMENT — PAIN SCALES - GENERAL: PAINLEVEL: NO PAIN (1)

## 2021-03-12 NOTE — LETTER
3/12/2021       RE: Ilan Bazzi  [address]  [place] [address]     Dear Colleague,    Thank you for referring your patient, Ilan Bazzi, to the [facility] PLASTIC AND RECONSTRUCTIVE SURGERY CLINIC [place] at [facility]. Please see a copy of my visit note below.    PLASTIC SURGERY OUTPATIENT NOTE     SUBJECTIVE  No interim events. Very minimal pain. No other complaints.    PHYSICAL EXAM  No acute distress  Regular  Nonlabored  FOREHEAD edematous, mild periorbital ecchymosis, scalp incision c/d/i, no erythema/drainage, staples in place, intraoral incisions intact, neck incision c/d/i, no erythema/drainage  Warm, well-perfused    ASSESSMENT/PLAN  25 year old transgender lady with gender dysphoria status post facial gender confirmation surgery, recontouring of forehead, fronto-orbital bar, bilateral brow pexy, recontouring of chin and mandible, thyroid reduction chondroplasty 3/9/2021 , no new acute issues  - drain removed today  - start taping neck incision until final follow-up  - elevate head of bed  - clean scalp incision bid with peroxide, then apply thin layer of ointment  - liquid diet x 2 weeks postop, continue peridex x 1 week  - follow-up in 1 month    Total time spent with for this visit was 20 minutes, including review of documentation, counseling/discussion with patient, documentation, and organizing any potential follow-up.    Shayla Cardenas MD  Pediatric Cleft and Craniofacial Surgery  Division of Plastic Surgery  Pager: [phone]

## 2021-03-12 NOTE — NURSING NOTE
"Chief Complaint   Patient presents with     Surgical Followup     Post op visit. Drain removal.        Vitals:    03/12/21 1153   BP: 131/71   Pulse: 78   SpO2: 98%   Weight: 74.8 kg (165 lb)   Height: 1.778 m (5' 10\")       Body mass index is 23.68 kg/m .    Russell Jones LPN        "

## 2021-03-12 NOTE — PROGRESS NOTES
PLASTIC SURGERY OUTPATIENT NOTE     SUBJECTIVE  No interim events. Very minimal pain. No other complaints.    PHYSICAL EXAM  No acute distress  Regular  Nonlabored  FOREHEAD edematous, mild periorbital ecchymosis, scalp incision c/d/i, no erythema/drainage, staples in place, intraoral incisions intact, neck incision c/d/i, no erythema/drainage  Warm, well-perfused    ASSESSMENT/PLAN  25 year old transgender lady with gender dysphoria status post facial gender confirmation surgery, recontouring of forehead, fronto-orbital bar, bilateral brow pexy, recontouring of chin and mandible, thyroid reduction chondroplasty 3/9/2021 , no new acute issues  - drain removed today  - start taping neck incision until final follow-up  - elevate head of bed  - clean scalp incision bid with peroxide, then apply thin layer of ointment  - liquid diet x 2 weeks postop, continue peridex x 1 week  - follow-up in 1 month    Total time spent with for this visit was 20 minutes, including review of documentation, counseling/discussion with patient, documentation, and organizing any potential follow-up.    Shayla Cardenas MD  Pediatric Cleft and Craniofacial Surgery  Division of Plastic Surgery  Pager: [phone]

## 2021-03-19 ENCOUNTER — OFFICE VISIT (OUTPATIENT)
Dept: PLASTIC SURGERY | Facility: CLINIC | Age: 26
End: 2021-03-19
Payer: COMMERCIAL

## 2021-03-19 ENCOUNTER — ALLIED HEALTH/NURSE VISIT (OUTPATIENT)
Dept: SURGERY | Facility: CLINIC | Age: 26
End: 2021-03-19
Payer: COMMERCIAL

## 2021-03-19 VITALS
BODY MASS INDEX: 23.68 KG/M2 | HEART RATE: 94 BPM | SYSTOLIC BLOOD PRESSURE: 120 MMHG | TEMPERATURE: 98.4 F | OXYGEN SATURATION: 98 % | DIASTOLIC BLOOD PRESSURE: 80 MMHG | HEIGHT: 70 IN

## 2021-03-19 DIAGNOSIS — F64.0 GENDER DYSPHORIA IN ADOLESCENT AND ADULT: Primary | ICD-10-CM

## 2021-03-19 PROCEDURE — 99024 POSTOP FOLLOW-UP VISIT: CPT | Mod: KX | Performed by: PLASTIC SURGERY

## 2021-03-19 ASSESSMENT — PAIN SCALES - GENERAL: PAINLEVEL: NO PAIN (0)

## 2021-03-19 NOTE — PROGRESS NOTES
PLASTIC SURGERY OUTPATIENT NOTE     SUBJECTIVE  Swelling improved. No other complaints.    PHYSICAL EXAM  No acute distress  Regular  Nonlabored  FOREHEAD with mild edema, mild periorbital ecchymosis, scalp incision c/d/i, no erythema/drainage, staples in place, intraoral incisions intact, neck incision c/d/i, no erythema/drainage  Warm, well-perfused    ASSESSMENT/PLAN  25 year old transgender lady with gender dysphoria status post facial gender confirmation surgery, recontouring of forehead, fronto-orbital bar, bilateral brow pexy, recontouring of chin and mandible, thyroid reduction chondroplasty 3/9/2021 , no new acute issues  - staples removed today   - elevate head of bed until swelling resolved  - continue thin layer of oitnment  - advance to soft diet x 1 week, then regular  - follow-up for 1 month postop    Total time spent with for this visit was 20 minutes, including review of documentation, counseling/discussion with patient, documentation, and organizing any potential follow-up.    Shayla Cardenas MD  Pediatric Cleft and Craniofacial Surgery  Division of Plastic Surgery  Pager: [phone]

## 2021-03-19 NOTE — LETTER
3/19/2021       RE: Ilan Bazzi  [address]  [place] [address]     Dear Colleague,    Thank you for referring your patient, Ilan Bazzi, to the [facility] PLASTIC AND RECONSTRUCTIVE SURGERY CLINIC [place] at [facility]. Please see a copy of my visit note below.    PLASTIC SURGERY OUTPATIENT NOTE     SUBJECTIVE  Swelling improved. No other complaints.    PHYSICAL EXAM  No acute distress  Regular  Nonlabored  FOREHEAD with mild edema, mild periorbital ecchymosis, scalp incision c/d/i, no erythema/drainage, staples in place, intraoral incisions intact, neck incision c/d/i, no erythema/drainage  Warm, well-perfused    ASSESSMENT/PLAN  25 year old transgender lady with gender dysphoria status post facial gender confirmation surgery, recontouring of forehead, fronto-orbital bar, bilateral brow pexy, recontouring of chin and mandible, thyroid reduction chondroplasty 3/9/2021 , no new acute issues  - staples removed today   - elevate head of bed until swelling resolved  - continue thin layer of oitnment  - advance to soft diet x 1 week, then regular  - follow-up for 1 month postop    Total time spent with for this visit was 20 minutes, including review of documentation, counseling/discussion with patient, documentation, and organizing any potential follow-up.    Shayla Cardenas MD  Pediatric Cleft and Craniofacial Surgery  Division of Plastic Surgery  Pager: [phone]

## 2021-03-19 NOTE — NURSING NOTE
"Chief Complaint   Patient presents with     Surgical Followup     2wk post op, [facility] fem, DOS 3/9       Vitals:    03/19/21 1130   BP: 120/80   BP Location: Left arm   Patient Position: Chair   Cuff Size: Adult Regular   Pulse: 94   Temp: 98.4  F (36.9  C)   TempSrc: Oral   SpO2: 98%   Height: 1.778 m (5' 10\")       Body mass index is 23.68 kg/m .    Juarez Perez, EMT    "

## 2021-04-05 ENCOUNTER — TELEPHONE (OUTPATIENT)
Dept: SURGERY | Facility: CLINIC | Age: 26
End: 2021-04-05

## 2021-04-05 NOTE — TELEPHONE ENCOUNTER
Returned patient's call re: creatinine level pre-operatively.   Instructed to follow-up with PCP for redraw to make sure Creatinine is correct.  Does not have PCP.  Referred her to the [facility] NP clinic and encouraged to follow-up and to established care.  Verbalized an understanding.

## 2021-04-21 ENCOUNTER — TRANSFERRED RECORDS (OUTPATIENT)
Dept: HEALTH INFORMATION MANAGEMENT | Facility: CLINIC | Age: 26
End: 2021-04-21

## 2021-04-24 ENCOUNTER — HEALTH MAINTENANCE LETTER (OUTPATIENT)
Age: 26
End: 2021-04-24

## 2021-04-27 ENCOUNTER — OFFICE VISIT (OUTPATIENT)
Dept: FAMILY MEDICINE | Facility: CLINIC | Age: 26
End: 2021-04-27
Payer: COMMERCIAL

## 2021-04-27 VITALS
DIASTOLIC BLOOD PRESSURE: 69 MMHG | HEART RATE: 119 BPM | TEMPERATURE: 98.2 F | SYSTOLIC BLOOD PRESSURE: 113 MMHG | WEIGHT: 168.8 LBS | HEIGHT: 71 IN | OXYGEN SATURATION: 97 % | BODY MASS INDEX: 23.63 KG/M2

## 2021-04-27 DIAGNOSIS — R79.89 ELEVATED SERUM CREATININE: ICD-10-CM

## 2021-04-27 DIAGNOSIS — F32.A DEPRESSION, UNSPECIFIED DEPRESSION TYPE: ICD-10-CM

## 2021-04-27 DIAGNOSIS — F90.9 ATTENTION DEFICIT HYPERACTIVITY DISORDER (ADHD), UNSPECIFIED ADHD TYPE: ICD-10-CM

## 2021-04-27 DIAGNOSIS — G47.00 INSOMNIA, UNSPECIFIED TYPE: ICD-10-CM

## 2021-04-27 DIAGNOSIS — Z00.00 ENCOUNTER FOR MEDICAL EXAMINATION TO ESTABLISH CARE: Primary | ICD-10-CM

## 2021-04-27 DIAGNOSIS — F43.10 PTSD (POST-TRAUMATIC STRESS DISORDER): ICD-10-CM

## 2021-04-27 DIAGNOSIS — F64.0 GENDER DYSPHORIA IN ADOLESCENT AND ADULT: ICD-10-CM

## 2021-04-27 LAB
ALBUMIN SERPL-MCNC: 3.8 G/DL (ref 3.4–5)
ALP SERPL-CCNC: 55 U/L (ref 40–150)
ALT SERPL W P-5'-P-CCNC: 26 U/L (ref 0–50)
ANION GAP SERPL CALCULATED.3IONS-SCNC: 5 MMOL/L (ref 3–14)
AST SERPL W P-5'-P-CCNC: 18 U/L (ref 0–45)
BILIRUB SERPL-MCNC: 0.3 MG/DL (ref 0.2–1.3)
BUN SERPL-MCNC: 15 MG/DL (ref 7–30)
CALCIUM SERPL-MCNC: 9.2 MG/DL (ref 8.5–10.1)
CHLORIDE SERPL-SCNC: 106 MMOL/L (ref 94–109)
CHOLEST SERPL-MCNC: 182 MG/DL
CO2 SERPL-SCNC: 26 MMOL/L (ref 20–32)
CREAT SERPL-MCNC: 0.89 MG/DL (ref 0.52–1.04)
ESTRADIOL SERPL-MCNC: 115 PG/ML
GFR SERPL CREATININE-BSD FRML MDRD: 89 ML/MIN/{1.73_M2}
GLUCOSE SERPL-MCNC: 82 MG/DL (ref 70–99)
HDLC SERPL-MCNC: 52 MG/DL
HGB BLD-MCNC: 15 G/DL (ref 11.7–15.7)
LDLC SERPL CALC-MCNC: 114 MG/DL
NONHDLC SERPL-MCNC: 131 MG/DL
POTASSIUM SERPL-SCNC: 4.3 MMOL/L (ref 3.4–5.3)
PROT SERPL-MCNC: 7.2 G/DL (ref 6.8–8.8)
SODIUM SERPL-SCNC: 138 MMOL/L (ref 133–144)
TRIGL SERPL-MCNC: 86 MG/DL

## 2021-04-27 PROCEDURE — 80053 COMPREHEN METABOLIC PANEL: CPT | Performed by: STUDENT IN AN ORGANIZED HEALTH CARE EDUCATION/TRAINING PROGRAM

## 2021-04-27 PROCEDURE — 99204 OFFICE O/P NEW MOD 45 MIN: CPT | Performed by: STUDENT IN AN ORGANIZED HEALTH CARE EDUCATION/TRAINING PROGRAM

## 2021-04-27 PROCEDURE — 36415 COLL VENOUS BLD VENIPUNCTURE: CPT | Performed by: STUDENT IN AN ORGANIZED HEALTH CARE EDUCATION/TRAINING PROGRAM

## 2021-04-27 PROCEDURE — 84403 ASSAY OF TOTAL TESTOSTERONE: CPT | Performed by: STUDENT IN AN ORGANIZED HEALTH CARE EDUCATION/TRAINING PROGRAM

## 2021-04-27 PROCEDURE — 82670 ASSAY OF TOTAL ESTRADIOL: CPT | Performed by: STUDENT IN AN ORGANIZED HEALTH CARE EDUCATION/TRAINING PROGRAM

## 2021-04-27 PROCEDURE — 80061 LIPID PANEL: CPT | Performed by: STUDENT IN AN ORGANIZED HEALTH CARE EDUCATION/TRAINING PROGRAM

## 2021-04-27 PROCEDURE — 85018 HEMOGLOBIN: CPT | Performed by: STUDENT IN AN ORGANIZED HEALTH CARE EDUCATION/TRAINING PROGRAM

## 2021-04-27 RX ORDER — DEXTROAMPHETAMINE SACCHARATE, AMPHETAMINE ASPARTATE, DEXTROAMPHETAMINE SULFATE AND AMPHETAMINE SULFATE 5; 5; 5; 5 MG/1; MG/1; MG/1; MG/1
TABLET ORAL
COMMUNITY
Start: 2021-04-21 | End: 2021-06-25

## 2021-04-27 RX ORDER — DEXTROAMPHETAMINE SULFATE, DEXTROAMPHETAMINE SACCHARATE, AMPHETAMINE SULFATE AND AMPHETAMINE ASPARTATE 5; 5; 5; 5 MG/1; MG/1; MG/1; MG/1
40 CAPSULE, EXTENDED RELEASE ORAL
COMMUNITY
Start: 2021-04-22 | End: 2021-06-25

## 2021-04-27 ASSESSMENT — MIFFLIN-ST. JEOR: SCORE: 1601.8
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
## 2021-04-29 LAB — TESTOST SERPL-MCNC: 713 NG/DL (ref 8–60)

## 2021-05-20 ENCOUNTER — VIRTUAL VISIT (OUTPATIENT)
Dept: FAMILY MEDICINE | Facility: CLINIC | Age: 26
End: 2021-05-20
Payer: COMMERCIAL

## 2021-05-20 DIAGNOSIS — F64.0 GENDER DYSPHORIA IN ADOLESCENT AND ADULT: Primary | ICD-10-CM

## 2021-05-20 DIAGNOSIS — M79.10 MYALGIA: ICD-10-CM

## 2021-05-20 DIAGNOSIS — F32.A DEPRESSION, UNSPECIFIED DEPRESSION TYPE: ICD-10-CM

## 2021-05-20 DIAGNOSIS — F90.9 ATTENTION DEFICIT HYPERACTIVITY DISORDER (ADHD), UNSPECIFIED ADHD TYPE: ICD-10-CM

## 2021-05-20 DIAGNOSIS — F41.9 ANXIETY: ICD-10-CM

## 2021-05-20 PROCEDURE — 99215 OFFICE O/P EST HI 40 MIN: CPT | Mod: 95 | Performed by: STUDENT IN AN ORGANIZED HEALTH CARE EDUCATION/TRAINING PROGRAM

## 2021-05-20 RX ORDER — NEEDLES, SAFETY 18GX1 1/2"
NEEDLE, DISPOSABLE MISCELLANEOUS
Qty: 100 EACH | Refills: 3 | Status: SHIPPED | OUTPATIENT
Start: 2021-05-20 | End: 2022-06-23

## 2021-05-20 RX ORDER — ESTRADIOL VALERATE 20 MG/ML
6 INJECTION INTRAMUSCULAR
Qty: 5 ML | Refills: 1 | Status: SHIPPED | OUTPATIENT
Start: 2021-05-20 | End: 2021-05-20

## 2021-05-20 RX ORDER — ESTRADIOL 1 MG/1
TABLET ORAL
COMMUNITY
Start: 2021-04-26 | End: 2021-05-20

## 2021-05-20 RX ORDER — ESTRADIOL VALERATE 20 MG/ML
4 INJECTION INTRAMUSCULAR
Qty: 5 ML | Refills: 0 | Status: SHIPPED | OUTPATIENT
Start: 2021-05-20 | End: 2021-08-11

## 2021-05-20 RX ORDER — LANOLIN ALCOHOL/MO/W.PET/CERES
400 CREAM (GRAM) TOPICAL DAILY
COMMUNITY
End: 2022-04-20

## 2021-05-20 RX ORDER — FINASTERIDE 1 MG/1
1 TABLET, FILM COATED ORAL EVERY EVENING
Qty: 90 TABLET | Refills: 2 | Status: SHIPPED | OUTPATIENT
Start: 2021-05-20 | End: 2022-06-10

## 2021-05-20 RX ORDER — PROGESTERONE 100 MG/1
100 CAPSULE ORAL DAILY
Qty: 90 CAPSULE | Refills: 2 | Status: SHIPPED | OUTPATIENT
Start: 2021-05-20 | End: 2022-02-16

## 2021-05-20 ASSESSMENT — PATIENT HEALTH QUESTIONNAIRE - PHQ9: SUM OF ALL RESPONSES TO PHQ QUESTIONS 1-9: 11

## 2021-05-20 NOTE — PATIENT INSTRUCTIONS
MAGNESIUM    Magnesium for tight muscles and/or headaches. In the early 1900's people were consuming approximately 10 times the amount of magnesium obtained from foods now.    Taurate: Heart benefits, stabilizing cell membrane, easily absorbed, less laxative effect.    Malate: readily soluble, improves energy. Benefit for chronic fatigue, pain, insomnia and musculoskeletal dysfunction of fibromyalgia.  Dimagnesium malate: Same properties with twice as much magnesium available.    Glycinate: small laxative effect. Five times more available than magnesium oxide. Better nutrient absorption. Most expensive form.    Citrate: cheap. Better laxative effect.   Magnesium oxide: not chelated (less absorption). Cheap. High elemental magnesium but poorly absorbed and used by the body. More of a laxative effect. More likely to cause free radical damage (not good).     Avoid magnesium glutamate: forms glutamic acid which is neurotoxic.   Avoid magnesium aspartate: forms aspartic acid. Neurotoxic.   Best to take between meals.     Directions:   Start with 200-300 mg tablets, as these allow you to target in on your optimal dose.  1 tab daily x 3 days.   Increase by one tab every 3-5 days.  Lower the dose if experiencing loose stools.  Some people may end up taking as much as 1,000-2,000 mg daily.   Have with plenty of water.    Food Sources:  Foods containing magnesium include whole grains, leafy green vegetables (spinach and Swiss chard are great sources), as well as almonds, cashews and other nuts, avocados, beans, and halibut. Be aware that a diet high in fat may cause less magnesium to be absorbed, and cooking may decrease the magnesium content of food.

## 2021-05-20 NOTE — PROGRESS NOTES
"Ilan is a 25 year old who is being evaluated via a billable video visit.      How would you like to obtain your AVS? [employer]      Video Start Time: 10:10AM    Assessment & Plan     Gender dysphoria in adolescent and adult  Feminizing HRT. Testosterone poorly suppressed, has always been the case. Discussed at length multiple possible factors for anxiety and muscle tightness but certainly high T could be playing a role. Has avoided fadumo in the past. Concerns about hair loss and pursuing coverage for hair transplant - I am happy to write a letter. Discussed poorly suppressed T w/ Dr. Davis as well, suggested change in E route first to see if better T suppression. Pt prefers injectable over transdermal d/t concern for patches staying on w/ sweating/activity. RN visit for subcutaneous injection teaching tomorrow.     Discussed mid-dose given pt on higher end of oral estradiol dosing. Re-sent prescription for 4mg weekly -- on further thought I'm concerned that 6mg could result in supratherapeutic levels, can always increase if needed after next labs. Consider adding fadumo if indicated; would probably recommend leaving finasteride on for hair loss concerns but also depends on ED/sexual side effect concerns.    - continue finasteride, progesterone at current doses   - estradiol valerate (DELESTROGEN) 20 MG/ML injection  Dispense: 5 mL; Refill: 1  - Testosterone Total  - Comprehensive Metabolic Panel  - Hemoglobin (HGB)  - Lipid Cascade  - Estradiol   - needle, disp, 18G X 1\" MISC  Dispense: 25 each; Refill: 3  - syringe, disposable, 1 ML MISC  Dispense: 25 each; Refill: 3  - Needle, Disp, (BD ECLIPSE NEEDLE) 25G X 5/8\" MISC  Dispense: 100 each; Refill: 3  - Will plan for mid-cycle lab draw in 2-3 months unless concerns sooner.     Myalgia  Discussed increasing magnesium supplement, trying different formulation. Consider reviewing diagnostic criteria for RLS - doesn't totally sound like this but could be "     Anxiety  Depression, unspecified depression type  See above. Multiple factors likely at play. Will start with targeting hormones, revisit fluoxetine and other med options if not improving w/ better T suppression. Did not tolerate bupropion in the past. Consider adding buspirone to fluoxetine vs switching to escitalopram vs an SNRI    Attention deficit hyperactivity disorder (ADHD), unspecified ADHD type  On Adderall, awaiting records from [facility] to transfer prescribing         Review of prior external note(s) from - Outside records from [place]  Review of the result(s) of each unique test - CMP, Hgb, lipids, T  Ordering of each unique test  Prescription drug management  48 minutes spent on the date of the encounter doing chart review, history and exam, documentation and further activities per the note      Return in about 2 months (around 7/20/2021) for Follow up, with me.    Sofy Hudson DO  [facility] Talavera is a 25 year old who presents for the following health issues      Chief Complaint   Patient presents with     Results     wants to discuss the results of bloodwork      Refill Request     wants to make sure refills were received from other health provider           HPI     HRT      Legs: Once every 3d, wakes up at 3-4 in the morning. Muscles so tense she needs to stretch before she goes back to sleep. Hamstrings and calves have a lot of tension in them.   - no change in diet or workout routine  - theories: anxiety thing, long-release Adderall change (seems odd to her),   - drinks almost exclusively water - water bottles by her desk, drinks multiple daily     [facility] meds:  Adderall XR 40mg daily  Stopped taking short release   Was having some trouble w/ sleep - short release in the evening made it tough to fall asleep. Now w/ just long release in ht emorning trouble falling asleep, not staying asleep.     Maybe less peak in focus w/ the long release -  "\"miss the bar\" on wanting to focus     HRT  See recent [employer] messaging     Anxiety meds:   Has tried sertraline, fluoxetine, mirtazepine,   Gabapentin for panic attack as needed years ago   Bupropion allergy listed - itching and swelling to feet. No adverse effects until she was going to sleep and noticed her feet were ichy, didn't think anything of it, woke up 2h later and feet were itchy   Sexual side effects actually less of an issue recently     PHQ 5/20/2021   PHQ-9 Total Score 11   Q9: Thoughts of better off dead/self-harm past 2 weeks Not at all         Review of Systems   Pertinent positives and negatives per HPI.      Social:  Popeye, a loud siamese cat         Objective           Vitals:  No vitals were obtained today due to virtual visit.    Physical Exam   GENERAL: Healthy, alert and no distress  EYES: Eyes grossly normal to inspection.  No discharge or erythema, or obvious scleral/conjunctival abnormalities.  RESP: No audible wheeze, cough, or visible cyanosis.  No visible retractions or increased work of breathing.    SKIN: Visible skin clear. No significant rash, abnormal pigmentation or lesions.  NEURO: Cranial nerves grossly intact.  Mentation and speech appropriate for age.  PSYCH: Mentation appears normal, affect normal/bright, judgement and insight intact, normal speech and appearance well-groomed.    Office Visit on 04/27/2021   Component Date Value Ref Range Status     Testosterone Total 04/27/2021 713* 8 - 60 ng/dL Final    Comment: This test was developed and its performance characteristics determined by the   [facility].   It has not been cleared or approved by the FDA. The laboratory is regulated   under CLIA as qualified to perform high-complexity testing. This test is used   for clinical purposes. It should not be regarded as investigational or for   research.       Estradiol 04/27/2021 115  pg/mL Final    Comment: " Estradiol reference ranges for pre-menopausal  Follicular     pg/mL  Mid-cycle    pg/mL  Luteal          pg/mL       Sodium 04/27/2021 138  133 - 144 mmol/L Final     Potassium 04/27/2021 4.3  3.4 - 5.3 mmol/L Final     Chloride 04/27/2021 106  94 - 109 mmol/L Final     Carbon Dioxide 04/27/2021 26  20 - 32 mmol/L Final     Anion Gap 04/27/2021 5  3 - 14 mmol/L Final     Glucose 04/27/2021 82  70 - 99 mg/dL Final     Urea Nitrogen 04/27/2021 15  7 - 30 mg/dL Final     Creatinine 04/27/2021 0.89  0.52 - 1.04 mg/dL Final     GFR Estimate 04/27/2021 89  >60 mL/min/[1.73_m2] Final    Comment: Non  GFR Calc  Starting 12/18/2018, serum creatinine based estimated GFR (eGFR) will be   calculated using the Chronic Kidney Disease Epidemiology Collaboration   (CKD-EPI) equation.       GFR Estimate If Black 04/27/2021 >90  >60 mL/min/[1.73_m2] Final    Comment:  GFR Calc  Starting 12/18/2018, serum creatinine based estimated GFR (eGFR) will be   calculated using the Chronic Kidney Disease Epidemiology Collaboration   (CKD-EPI) equation.       Calcium 04/27/2021 9.2  8.5 - 10.1 mg/dL Final     Bilirubin Total 04/27/2021 0.3  0.2 - 1.3 mg/dL Final     Albumin 04/27/2021 3.8  3.4 - 5.0 g/dL Final     Protein Total 04/27/2021 7.2  6.8 - 8.8 g/dL Final     Alkaline Phosphatase 04/27/2021 55  40 - 150 U/L Final     ALT 04/27/2021 26  0 - 50 U/L Final     AST 04/27/2021 18  0 - 45 U/L Final     Hemoglobin 04/27/2021 15.0  11.7 - 15.7 g/dL Final     Cholesterol 04/27/2021 182  <200 mg/dL Final     Triglycerides 04/27/2021 86  <150 mg/dL Final     HDL Cholesterol 04/27/2021 52  >49 mg/dL Final     LDL Cholesterol Calculated 04/27/2021 114* <100 mg/dL Final    Comment: Above desirable:  100-129 mg/dl  Borderline High:  130-159 mg/dL  High:             160-189 mg/dL  Very high:       >189 mg/dl       Non HDL Cholesterol 04/27/2021 131* <130 mg/dL Final    Comment: Above Desirable:  130-159  mg/dl  Borderline high:  160-189 mg/dl  High:             190-219 mg/dl  Very high:       >219 mg/dl                 Video-Visit Details    Type of service:  Video Visit    Video End Time: 10:46    Originating Location (pt. Location): Home    Distant Location (provider location):  [facility]     Platform used for Video Visit: [employer]

## 2021-05-21 ENCOUNTER — ALLIED HEALTH/NURSE VISIT (OUTPATIENT)
Dept: FAMILY MEDICINE | Facility: CLINIC | Age: 26
End: 2021-05-21
Payer: COMMERCIAL

## 2021-05-21 DIAGNOSIS — F64.0 GENDER DYSPHORIA IN ADOLESCENT AND ADULT: Primary | ICD-10-CM

## 2021-05-21 PROCEDURE — 99211 OFF/OP EST MAY X REQ PHY/QHP: CPT

## 2021-05-21 NOTE — NURSING NOTE
"Patient presented to clinic with all supplies for subQ estrogen injection teaching. Patient presented to visit alone    RN reviewed necessary supplies: difference in needles (drawing up vs injecting), how to read a syringe, how to read medication label, disposal of sharps, and proper hand hygiene.     Discussed how to switch out needles and patient demonstrated understanding of reading syringe. RN reviewed safe needle handling (using \"scoop\" method). Patient independently augustine up proper amount of Delestrogen.     RN discussed site for subQ injection and reviewed proper subQ injection technique. Patient practiced using injecting pad.    At end of visit, patient independently completed self-injection of 0.2 mL (4 mg) Delestrogen into left abdomen under supervision of RN.    Completed visit with discussion of refill policy.     Patient verbalized understanding and was engaged during appointment.    Dr. Skinner was the preceptor on site for this visit and available for questions.    Kai Mercado RN      "

## 2021-06-18 ENCOUNTER — OFFICE VISIT (OUTPATIENT)
Dept: PLASTIC SURGERY | Facility: CLINIC | Age: 26
End: 2021-06-18
Payer: COMMERCIAL

## 2021-06-18 VITALS
HEART RATE: 112 BPM | WEIGHT: 169.7 LBS | HEIGHT: 71 IN | OXYGEN SATURATION: 95 % | DIASTOLIC BLOOD PRESSURE: 75 MMHG | BODY MASS INDEX: 23.76 KG/M2 | SYSTOLIC BLOOD PRESSURE: 119 MMHG

## 2021-06-18 DIAGNOSIS — F64.0 GENDER DYSPHORIA IN ADOLESCENT AND ADULT: Primary | ICD-10-CM

## 2021-06-18 PROCEDURE — 99213 OFFICE O/P EST LOW 20 MIN: CPT | Mod: KX | Performed by: PLASTIC SURGERY

## 2021-06-18 ASSESSMENT — PAIN SCALES - GENERAL: PAINLEVEL: NO PAIN (0)

## 2021-06-18 ASSESSMENT — MIFFLIN-ST. JEOR: SCORE: 1605.88

## 2021-06-18 NOTE — PROGRESS NOTES
PLASTIC SURGERY OUTPATIENT NOTE     SUBJECTIVE  No new complaints. Feels clicking when raising her eyebrows.     PHYSICAL EXAM  No acute distress  Regular  Nonlabored  FOREHEAD edema resolved, sensibility/motor function intact and symmetric, softened fronto-orbital bar  CHIN with no edema, sensibility intact, intra-oral incisions healed  NECK INCISION c/d/i, no erythema/drainage, no signs of infection, some scar banding with maximum neck extension  Warm, well-perfused    ASSESSMENT/PLAN  25 year old transgender lady with gender dysphoria status post facial gender confirmation surgery, recontouring of forehead, fronto-orbital bar, bilateral brow pexy, recontouring of chin and mandible, thyroid reduction chondroplasty 3/9/2021 , no new acute issues  - no precautions at this time  - activities as tolerated  - try scar massage to neck  - follow-up prn    Total time spent with for this visit was 20 minutes, including review of documentation, counseling/discussion with patient, documentation, and organizing any potential follow-up.    Shayla Cardenas MD  Pediatric Cleft and Craniofacial Surgery  Division of Plastic Surgery  Pager: [phone]

## 2021-06-18 NOTE — NURSING NOTE
"Chief Complaint   Patient presents with     RECHECK     3 month post op.       Vitals:    06/18/21 1231   BP: 119/75   BP Location: Left arm   Patient Position: Sitting   Cuff Size: Adult Regular   Pulse: 112   SpO2: 95%   Weight: 77 kg (169 lb 11.2 oz)   Height: 1.803 m (5' 11\")       Body mass index is 23.67 kg/m .                          Elva Meyers EMT    "

## 2021-06-18 NOTE — LETTER
6/18/2021       RE: Ilan Bazzi  [address]  [place] [address]     Dear Colleague,    Thank you for referring your patient, Ilan Bazzi, to the [facility] [place] at [facility]. Please see a copy of my visit note below.    PLASTIC SURGERY OUTPATIENT NOTE     SUBJECTIVE  No new complaints. Feels clicking when raising her eyebrows.     PHYSICAL EXAM  No acute distress  Regular  Nonlabored  FOREHEAD edema resolved, sensibility/motor function intact and symmetric, softened fronto-orbital bar  CHIN with no edema, sensibility intact, intra-oral incisions healed  NECK INCISION c/d/i, no erythema/drainage, no signs of infection, some scar banding with maximum neck extension  Warm, well-perfused    ASSESSMENT/PLAN  25 year old transgender lady with gender dysphoria status post facial gender confirmation surgery, recontouring of forehead, fronto-orbital bar, bilateral brow pexy, recontouring of chin and mandible, thyroid reduction chondroplasty 3/9/2021 , no new acute issues  - no precautions at this time  - activities as tolerated  - try scar massage to neck  - follow-up prn    Total time spent with for this visit was 20 minutes, including review of documentation, counseling/discussion with patient, documentation, and organizing any potential follow-up.    Shayla Cardenas MD  Pediatric Cleft and Craniofacial Surgery  Division of Plastic Surgery  Pager: [phone]            Again, thank you for allowing me to participate in the care of your patient.      Sincerely,    Shayla Cardenas MD

## 2021-06-25 ENCOUNTER — VIRTUAL VISIT (OUTPATIENT)
Dept: FAMILY MEDICINE | Facility: CLINIC | Age: 26
End: 2021-06-25
Payer: COMMERCIAL

## 2021-06-25 DIAGNOSIS — F90.9 ATTENTION DEFICIT HYPERACTIVITY DISORDER (ADHD), UNSPECIFIED ADHD TYPE: ICD-10-CM

## 2021-06-25 DIAGNOSIS — F64.0 GENDER DYSPHORIA IN ADOLESCENT AND ADULT: ICD-10-CM

## 2021-06-25 DIAGNOSIS — F32.A DEPRESSION, UNSPECIFIED DEPRESSION TYPE: Primary | ICD-10-CM

## 2021-06-25 DIAGNOSIS — M79.10 MYALGIA: ICD-10-CM

## 2021-06-25 DIAGNOSIS — G25.81 RESTLESS LEGS SYNDROME (RLS): ICD-10-CM

## 2021-06-25 PROCEDURE — 99214 OFFICE O/P EST MOD 30 MIN: CPT | Mod: 95 | Performed by: STUDENT IN AN ORGANIZED HEALTH CARE EDUCATION/TRAINING PROGRAM

## 2021-06-25 RX ORDER — MIRTAZAPINE 15 MG/1
15 TABLET, FILM COATED ORAL AT BEDTIME
Qty: 30 TABLET | Refills: 3 | Status: SHIPPED | OUTPATIENT
Start: 2021-06-25 | End: 2021-08-11

## 2021-06-25 RX ORDER — DEXTROAMPHETAMINE SULFATE, DEXTROAMPHETAMINE SACCHARATE, AMPHETAMINE SULFATE AND AMPHETAMINE ASPARTATE 7.5; 7.5; 7.5; 7.5 MG/1; MG/1; MG/1; MG/1
CAPSULE, EXTENDED RELEASE ORAL
COMMUNITY
Start: 2021-06-01 | End: 2021-07-04

## 2021-06-25 ASSESSMENT — ANXIETY QUESTIONNAIRES
2. NOT BEING ABLE TO STOP OR CONTROL WORRYING: NOT AT ALL
7. FEELING AFRAID AS IF SOMETHING AWFUL MIGHT HAPPEN: NOT AT ALL
1. FEELING NERVOUS, ANXIOUS, OR ON EDGE: NOT AT ALL
GAD7 TOTAL SCORE: 7
6. BECOMING EASILY ANNOYED OR IRRITABLE: MORE THAN HALF THE DAYS
3. WORRYING TOO MUCH ABOUT DIFFERENT THINGS: NOT AT ALL
5. BEING SO RESTLESS THAT IT IS HARD TO SIT STILL: MORE THAN HALF THE DAYS
IF YOU CHECKED OFF ANY PROBLEMS ON THIS QUESTIONNAIRE, HOW DIFFICULT HAVE THESE PROBLEMS MADE IT FOR YOU TO DO YOUR WORK, TAKE CARE OF THINGS AT HOME, OR GET ALONG WITH OTHER PEOPLE: NOT DIFFICULT AT ALL

## 2021-06-25 ASSESSMENT — PATIENT HEALTH QUESTIONNAIRE - PHQ9
SUM OF ALL RESPONSES TO PHQ QUESTIONS 1-9: 9
5. POOR APPETITE OR OVEREATING: NEARLY EVERY DAY

## 2021-06-25 NOTE — PATIENT INSTRUCTIONS
Patient Education   Here is the plan from today's visit    Depression/anxiety medication change:  1) stop fluoxetine. It will self-taper slowly in your system  2) 7 days later, start mirtazepine 15mg every night.   3) Let's follow up about a month after you start mirtazepine unless you have problems    Leg cramps: you do meet criteria for restless leg syndrome, so that is a possible reason for your symptoms. Medication like SSRIs could be a factor. Mirtazepine can also potentially cause those symptoms -- we will see how it goes after the switch.   - i'll check iron levels w next labs, this can also be a factor  - try a switch to magnesium taurate or magnesium malate -- more $$ but less GI side effects   - there are other med options if things stay bothersome    Here is a link to an overview on Restless Legs to learn more. I can't create a hyperlink so you'll have to copy-paste, sorry!  <[email or web address]    Mid-cycle HRT labs next month!    1. Depression, unspecified depression type  - mirtazapine (REMERON) 15 MG tablet; Take 1 tablet (15 mg) by mouth At Bedtime  Dispense: 30 tablet; Refill: 3    2. Attention deficit hyperactivity disorder (ADHD), unspecified ADHD type    3. Gender dysphoria in adolescent and adult  - Estradiol; Future  - Ferritin; Future  - Testosterone Total; Future  - Comprehensive Metabolic Panel; Future  - Hemoglobin (HGB); Future  - Lipid Cascade; Future    4. Restless legs syndrome (RLS)    5. Myalgia    6. Orgasm dysfunction      Please call or return to clinic if your symptoms don't go away.    Follow up plan  Return in about 4 weeks (around 7/23/2021).    Thank you for coming to [facility] today.  COVID-19 Vaccine:  If you are eligible for the COVID-19 vaccine, you can schedule via [employer] or call [employer] Scheduling at [phone]. If you need assistance with scheduling, please speak to a [facility]  Coordinator or your provider.   Lab Testing:  **If you had lab testing today and your results are reassuring or normal they will be mailed to you or sent through [employer] within 7 days.   **If the lab tests need quick action we will call you with the results.  **If you are having labs done on a different day, please call [phone] to schedule at [facility] or [phone] for other [facility] locations.   The phone number we will call with results is # [phone] (home) [phone] (work). If this is not the best number please call our clinic and change the number.  Medication Refills:  If you need any refills please call your pharmacy and they will contact us.   If you need to  your refill at a new pharmacy, please contact the new pharmacy directly. The new pharmacy will help you get your medications transferred faster.   Scheduling:  If you have any concerns about today's visit or wish to schedule another appointment please call our office during normal business hours [phone] (8-5:00 M-F)  If a referral was made to a [facility] and you don't get a call from central scheduling please call [phone].  If a Mammogram was ordered for you at [facility] call [phone] to schedule or change your appointment.  If you had an EKG/XRay/CT/Ultrasound/MRI ordered the number is [phone] to schedule or change your radiology appointment.   Medical Concerns:  If you have urgent medical concerns please call [phone] at any time of the day.    Sofy Hudson,

## 2021-06-25 NOTE — PROGRESS NOTES
Ilan is a 25 year old who is being evaluated via a billable video visit.      How would you like to obtain your AVS? [employer]    Video Start Time: 10:46 AM    Assessment & Plan     Depression, unspecified depression type  Sexual side effects - possible selective serotonin reuptake inhibitor contributing. Found mirtazepine very helpful in the past. Reviewed possibility of selective serotonin reuptake inhibitor and mirtaz both can contribute to RLS.   - discontinue fluoxetine, see AVS for cross taper plan   - mirtazapine (REMERON) 15 MG tablet  Dispense: 30 tablet; Refill: 3 -- goal 30mg daily     Attention deficit hyperactivity disorder (ADHD), unspecified ADHD type  Still via outside prescriber   - ADDERALL XR 30 MG 24 hr capsule    Gender dysphoria in adolescent and adult  Doing great w/ switch to injectable estradiol! Mid-cycle labs at 2-3 month brittanie.   - Estradiol  - Testosterone Total  - Comprehensive Metabolic Panel  - Hemoglobin (HGB)  - Lipid Cascade    Restless legs syndrome (RLS)  Myalgia  Meets criteria for RLS. Change from fluoxetine as above.   - trial different form of mag   - Ferritin  - consider dedicated pharmacotherapy if other changes not helpful     Orgasm dysfunction  Changes as above. Sildenafil or simliar an option if needed .      Review of external notes as documented elsewhere in note  Diagnosis or treatment significantly limited by social determinants of health - trans patient at risk for healthcare bias  Ordering of each unique test  Prescription drug management  35 minutes spent on the date of the encounter doing chart review, history and exam, documentation and further activities per the note      Return in about 4 weeks (around 7/23/2021).    Sofy Hudson DO  [facility]    Subjective   Ilan is a 25 year old who presents for the following health issues      Chief Complaint   Patient presents with     Recheck Medication     Psychiatric meds and follow  up-magnesium supplement and effects to digestive health (since increasing, has been having soft stools/diarrhea; stopped taking magnesium because of this)        HPI     HRT: estradiol injections - going well! Noticing more breast growth. Way easier to remember weekly than twice daily.   Injection day: Friday        Magnesium: started for muscle tension. Was actually better w/ magnesium but when started taking the second capsule a day, had GI issues, mainly soft stools. Stopped taking 2 capsules. Muscle tension still an issue, wondering if there's something else we can try. Typically does a warm up and stretching.     Reviewed RLS diagonstic criteria -- positive for all questions. Doesn't remember if better before fluoxetine     Mental Health: depresison and anxiety. Wanting to make a med switch   - had good results on mirtazepine in the past, just started w fluoxetine bc Psych wanted to     TOMÁS-7 SCORE 6/25/2021   Total Score 7       PHQ 5/20/2021 6/25/2021   PHQ-9 Total Score 11 9   Q9: Thoughts of better off dead/self-harm past 2 weeks Not at all Not at all       Review of Systems   Pertinent positives and negatives per HPI.           Objective           Vitals:  No vitals were obtained today due to virtual visit.    Physical Exam   GENERAL: Healthy, alert and no distress  EYES: Eyes grossly normal to inspection.  No discharge or erythema, or obvious scleral/conjunctival abnormalities.  RESP: No audible wheeze, cough, or visible cyanosis.  No visible retractions or increased work of breathing.    SKIN: Visible skin clear. No significant rash, abnormal pigmentation or lesions.  NEURO: Cranial nerves grossly intact.  Mentation and speech appropriate for age.  PSYCH: Mentation appears normal, affect normal/bright, judgement and insight intact, normal speech and appearance well-groomed.        Video-Visit Details    Type of service:  Video Visit    Video End Time:11:10 AM    Originating Location (pt. Location):  Home    Distant Location (provider location):  [facility]     Platform used for Video Visit: [employer]

## 2021-06-26 ASSESSMENT — ANXIETY QUESTIONNAIRES: GAD7 TOTAL SCORE: 7

## 2021-07-20 ENCOUNTER — DOCUMENTATION ONLY (OUTPATIENT)
Dept: FAMILY MEDICINE | Facility: CLINIC | Age: 26
End: 2021-07-20

## 2021-07-20 DIAGNOSIS — I86.1 LEFT VARICOCELE: ICD-10-CM

## 2021-07-20 NOTE — PROGRESS NOTES
Received large pile of scanned records from [facility]. Relevant records as below:     Hx left varicocele.     HRT:   Gender identity is fluid to feminine, uses she/her pronouns.   Finasteride 1mg since 2/19 and estrogen since 06/2019, prometrium since 09/2020. Finasteride as antiestrogen to help slow scalp hair loss instead of spironolactone.     ADHD:  Was on adderall 10mg jdaily, switched from XR to IR due to efficacy. Assessment at [facility] in early 2020 showed ADHD, combined presentation.     Past labs:  CMP wnl 6/2020, creat 0.77, Hgb 14.6   T 809 6/24/2020  Estradiol 133, free estradiol 2.40 on 6/25/20    CMP wnl, creat 1.0 on 2/24/20   PSA total 0.1ng/mL 2/25/20     Prolactin 6.0 on 1/1/2019   Varicella immune 1/2/2019     US Testicles 10/20/20: L-sided varicocele. Calcifications of the L testicle without discrete mass. Right-sided scrotal jorge luis and epididymal head cyst which may explain clinical history of right mass.     Past trials of:   Fluoxetine  escitalopram   Sertraline

## 2021-08-03 ENCOUNTER — LAB (OUTPATIENT)
Dept: LAB | Facility: CLINIC | Age: 26
End: 2021-08-03
Attending: OPHTHALMOLOGY
Payer: COMMERCIAL

## 2021-08-03 DIAGNOSIS — F64.0 GENDER DYSPHORIA IN ADOLESCENT AND ADULT: ICD-10-CM

## 2021-08-03 DIAGNOSIS — G25.81 RESTLESS LEGS SYNDROME (RLS): ICD-10-CM

## 2021-08-03 LAB
ALBUMIN SERPL-MCNC: 3.6 G/DL (ref 3.4–5)
ALP SERPL-CCNC: 48 U/L (ref 40–150)
ALT SERPL W P-5'-P-CCNC: 27 U/L (ref 0–70)
ANION GAP SERPL CALCULATED.3IONS-SCNC: 3 MMOL/L (ref 3–14)
AST SERPL W P-5'-P-CCNC: 15 U/L (ref 0–45)
BILIRUB SERPL-MCNC: 0.2 MG/DL (ref 0.2–1.3)
BUN SERPL-MCNC: 13 MG/DL (ref 7–30)
CALCIUM SERPL-MCNC: 8.9 MG/DL (ref 8.5–10.1)
CHLORIDE BLD-SCNC: 108 MMOL/L (ref 94–109)
CHOLEST SERPL-MCNC: 155 MG/DL
CO2 SERPL-SCNC: 29 MMOL/L (ref 20–32)
CREAT SERPL-MCNC: 0.87 MG/DL (ref 0.52–1.25)
ESTRADIOL SERPL-MCNC: 271 PG/ML
FASTING STATUS PATIENT QL REPORTED: ABNORMAL
FERRITIN SERPL-MCNC: 21 NG/ML (ref 12–388)
GFR SERPL CREATININE-BSD FRML MDRD: >90 ML/MIN/1.73M2
GLUCOSE BLD-MCNC: 92 MG/DL (ref 70–99)
HDLC SERPL-MCNC: 42 MG/DL
HGB BLD-MCNC: 13.8 G/DL (ref 11.7–17.7)
IRON SATN MFR SERPL: 29 % (ref 15–46)
IRON SERPL-MCNC: 95 UG/DL (ref 35–180)
LDLC SERPL CALC-MCNC: 63 MG/DL
NONHDLC SERPL-MCNC: 113 MG/DL
POTASSIUM BLD-SCNC: 4 MMOL/L (ref 3.4–5.3)
PROT SERPL-MCNC: 6.7 G/DL (ref 6.8–8.8)
SODIUM SERPL-SCNC: 140 MMOL/L (ref 133–144)
TIBC SERPL-MCNC: 330 UG/DL (ref 240–430)
TRANSFERRIN SERPL-MCNC: 272 MG/DL (ref 210–360)
TRIGL SERPL-MCNC: 250 MG/DL

## 2021-08-03 PROCEDURE — 83550 IRON BINDING TEST: CPT | Performed by: PATHOLOGY

## 2021-08-03 PROCEDURE — 85018 HEMOGLOBIN: CPT | Performed by: PATHOLOGY

## 2021-08-03 PROCEDURE — 84466 ASSAY OF TRANSFERRIN: CPT | Performed by: STUDENT IN AN ORGANIZED HEALTH CARE EDUCATION/TRAINING PROGRAM

## 2021-08-03 PROCEDURE — 80061 LIPID PANEL: CPT | Performed by: PATHOLOGY

## 2021-08-03 PROCEDURE — 36415 COLL VENOUS BLD VENIPUNCTURE: CPT | Performed by: PATHOLOGY

## 2021-08-03 PROCEDURE — 82670 ASSAY OF TOTAL ESTRADIOL: CPT | Performed by: STUDENT IN AN ORGANIZED HEALTH CARE EDUCATION/TRAINING PROGRAM

## 2021-08-03 PROCEDURE — 80053 COMPREHEN METABOLIC PANEL: CPT | Performed by: PATHOLOGY

## 2021-08-03 PROCEDURE — 82728 ASSAY OF FERRITIN: CPT | Performed by: PATHOLOGY

## 2021-08-03 PROCEDURE — 84403 ASSAY OF TOTAL TESTOSTERONE: CPT | Performed by: STUDENT IN AN ORGANIZED HEALTH CARE EDUCATION/TRAINING PROGRAM

## 2021-08-05 LAB — TESTOST SERPL-MCNC: 28 NG/DL (ref 8–950)

## 2021-08-10 ENCOUNTER — MYC REFILL (OUTPATIENT)
Dept: FAMILY MEDICINE | Facility: CLINIC | Age: 26
End: 2021-08-10

## 2021-08-10 DIAGNOSIS — F32.A DEPRESSION, UNSPECIFIED DEPRESSION TYPE: ICD-10-CM

## 2021-08-10 DIAGNOSIS — F64.0 GENDER DYSPHORIA IN ADOLESCENT AND ADULT: ICD-10-CM

## 2021-08-10 DIAGNOSIS — F90.9 ATTENTION DEFICIT HYPERACTIVITY DISORDER (ADHD), UNSPECIFIED ADHD TYPE: ICD-10-CM

## 2021-08-10 NOTE — TELEPHONE ENCOUNTER
"Patient requesting refill for adderall. Last virtual office visit on 6/25/21 with Dr. Hudson. RN unable to refill controlled substance, routing to PCP to refill if appropriate.     Request for medication refill:    Providers if patient needs an appointment and you are willing to give a one month supply please refill for one month and  send a letter/MyChart using \".SMILLIMITEDREFILL\" .smillimited and route chart to \"P SMI \" (Giving one month refill in non controlled medications is strongly recommended before denial)    If refill has been denied, meaning absolutely no refills without visit, please complete the smart phrase \".smirxrefuse\" and route it to the \"P SMI MED REFILLS\"  pool to inform the patient and the pharmacy.    Kai Mercado RN        "

## 2021-08-11 RX ORDER — MIRTAZAPINE 15 MG/1
30 TABLET, FILM COATED ORAL AT BEDTIME
Qty: 60 TABLET | Refills: 3 | Status: SHIPPED | OUTPATIENT
Start: 2021-08-11 | End: 2021-09-03

## 2021-08-11 RX ORDER — ESTRADIOL VALERATE 20 MG/ML
4 INJECTION INTRAMUSCULAR
Qty: 5 ML | Refills: 0 | Status: SHIPPED | OUTPATIENT
Start: 2021-08-11 | End: 2021-11-12

## 2021-08-11 RX ORDER — DEXTROAMPHETAMINE SULFATE, DEXTROAMPHETAMINE SACCHARATE, AMPHETAMINE SULFATE AND AMPHETAMINE ASPARTATE 7.5; 7.5; 7.5; 7.5 MG/1; MG/1; MG/1; MG/1
60 CAPSULE, EXTENDED RELEASE ORAL DAILY
Qty: 60 CAPSULE | Refills: 0 | Status: SHIPPED | OUTPATIENT
Start: 2021-08-11 | End: 2021-09-27

## 2021-08-12 PROBLEM — G25.81 RESTLESS LEGS SYNDROME (RLS): Status: ACTIVE | Noted: 2021-08-12

## 2021-09-02 NOTE — PROGRESS NOTES
Ilan is a 26 year old who is being evaluated via a billable video visit.      How would you like to obtain your AVS? Luxim    Video Start Time: 11:47 AM    Assessment & Plan     Depression, unspecified depression type  Persistent anhedonia since a bad breakup in July, minimal improvement w/ switch from fluoxetine to mirtazepine. Established w/ therapist. Hx multiple med trials. Discussed options, goal of stepwise changes if possible to be able to know what has been helpful. Inattention may improve with better addressing depression. Will maximize mirtazepine to see if effective. If no improvement 4-6 weeks after dose increase, would cross taper. Could consider SNRI or a different selective serotonin reuptake inhibitor. Could also consider addressing adhd meds at that time.   - mirtazapine (REMERON) 45 MG tablet  Dispense: 30 tablet; Refill: 3    Attention deficit hyperactivity disorder (ADHD), unspecified ADHD type  On max dose Adderall, helping attention and function but suppressing appetite. Discussed that ideally would either decrease dose or make a lateral change to different stimulant to see if helpful for appetite suppression. Shared decision making - no med change today, may consider a change at follow up. Will send info on Concerta and Vyvanse.     Loss of appetite  Likely r/t stimulant. mirtazepine has not been very helpful w/ appetite. Weight is stable.     Restless legs syndrome (RLS)  Sx improving on iron supplementation. If resolving, will just continue on iron. If still some persisting will repeat ferritin in 2-3 mos with target >75. Consider a nighttime prn like caribodpa-levodopa or pramipexole if still bothersome.     External hemorrhoids  Likely hemorrhoids vs anal fissure by description. Still some rectal bleeding and pain despite stool softener. Will trial a topical, RTC for in-person exam if persisting.   - phenylephrine-shark liver oil-mineral oil-petrolatum (PREPARATION H) 0.25-3-14-71.9 %  rectal ointment  Dispense: 57 g; Refill: 0    Gender dysphoria in adolescent and adult  HRT no changes or labs today       Diagnosis or treatment significantly limited by social determinants of health - trans at risk for healthcare bias  Prescription drug management  30 minutes spent on the date of the encounter doing chart review, history and exam, documentation and further activities per the note      Return in about 4 weeks (around 10/1/2021) for Follow up, with me.    Sofy DO Tristan  [facility] TERRANCE Talavera is a 26 year old who presents for the following health issues   HPI         ADHD, depression/anxiety:  adderall XR 60mg daily. Was 40mg XR daily before, increased in June   Past hx: Adderall 10mg IR twice daily Sept 2020, switched to XR in April 2021.      Started mirtazepine, now up to 30mg daily xfew weeks. No change in mood. Some change in sexual side effects but not really better or worse. No negative side effects. No positive change in sleep or appetite.     Therapist currently - seeing them once a week but she's off this coming Monday so every other week     Takes dog for walk. Forces self to work out. Helps for a little while. Helps w/ droswiness or feeling tired. Doesn't help with feeling interested.       BM: painful and blood or very soft with a greenish color, second attributes to iron, first has not been going away    RLS: somewhat better    PHQ 5/20/2021 6/25/2021 9/3/2021   PHQ-9 Total Score 11 9 10   Q9: Thoughts of better off dead/self-harm past 2 weeks Not at all Not at all Not at all     TOMÁS-7 SCORE 6/25/2021 9/3/2021   Total Score 7 0       Review of Systems   Pertinent positives and negatives per HPI.        Objective         Vitals:  No vitals were obtained today due to virtual visit.    Physical Exam   GENERAL: alert and no distress  EYES: Eyes grossly normal to inspection.  No discharge or erythema, or obvious scleral/conjunctival  abnormalities.  RESP: No audible wheeze, cough, or visible cyanosis.  No visible retractions or increased work of breathing.    SKIN: Visible skin clear. No significant rash, abnormal pigmentation or lesions.  NEURO: Cranial nerves grossly intact.  Mentation and speech appropriate for age.  PSYCH: Mentation appears normal, affect restricted, judgement and insight intact, normal speech and appearance well-groomed.      Video-Visit Details    Type of service:  Video Visit    Video End Time:12:05pm    Originating Location (pt. Location): Home    Distant Location (provider location):  [facility]     Platform used for Video Visit: Aleth

## 2021-09-03 ENCOUNTER — VIRTUAL VISIT (OUTPATIENT)
Dept: FAMILY MEDICINE | Facility: CLINIC | Age: 26
End: 2021-09-03
Payer: COMMERCIAL

## 2021-09-03 DIAGNOSIS — G25.81 RESTLESS LEGS SYNDROME (RLS): ICD-10-CM

## 2021-09-03 DIAGNOSIS — F64.0 GENDER DYSPHORIA IN ADOLESCENT AND ADULT: ICD-10-CM

## 2021-09-03 DIAGNOSIS — F90.9 ATTENTION DEFICIT HYPERACTIVITY DISORDER (ADHD), UNSPECIFIED ADHD TYPE: ICD-10-CM

## 2021-09-03 DIAGNOSIS — K64.4 EXTERNAL HEMORRHOIDS: ICD-10-CM

## 2021-09-03 DIAGNOSIS — F32.A DEPRESSION, UNSPECIFIED DEPRESSION TYPE: Primary | ICD-10-CM

## 2021-09-03 DIAGNOSIS — R63.0 LOSS OF APPETITE: ICD-10-CM

## 2021-09-03 PROCEDURE — 99214 OFFICE O/P EST MOD 30 MIN: CPT | Mod: 95 | Performed by: STUDENT IN AN ORGANIZED HEALTH CARE EDUCATION/TRAINING PROGRAM

## 2021-09-03 RX ORDER — MIRTAZAPINE 30 MG/1
TABLET, FILM COATED ORAL
COMMUNITY
Start: 2021-08-11 | End: 2021-09-27

## 2021-09-03 RX ORDER — MIRTAZAPINE 45 MG/1
45 TABLET, FILM COATED ORAL AT BEDTIME
Qty: 30 TABLET | Refills: 3 | Status: SHIPPED | OUTPATIENT
Start: 2021-09-03 | End: 2021-11-02

## 2021-09-03 ASSESSMENT — ANXIETY QUESTIONNAIRES
6. BECOMING EASILY ANNOYED OR IRRITABLE: NOT AT ALL
3. WORRYING TOO MUCH ABOUT DIFFERENT THINGS: NOT AT ALL
5. BEING SO RESTLESS THAT IT IS HARD TO SIT STILL: NOT AT ALL
IF YOU CHECKED OFF ANY PROBLEMS ON THIS QUESTIONNAIRE, HOW DIFFICULT HAVE THESE PROBLEMS MADE IT FOR YOU TO DO YOUR WORK, TAKE CARE OF THINGS AT HOME, OR GET ALONG WITH OTHER PEOPLE: NOT DIFFICULT AT ALL
2. NOT BEING ABLE TO STOP OR CONTROL WORRYING: NOT AT ALL
7. FEELING AFRAID AS IF SOMETHING AWFUL MIGHT HAPPEN: NOT AT ALL
1. FEELING NERVOUS, ANXIOUS, OR ON EDGE: NOT AT ALL
GAD7 TOTAL SCORE: 0

## 2021-09-03 ASSESSMENT — PATIENT HEALTH QUESTIONNAIRE - PHQ9
SUM OF ALL RESPONSES TO PHQ QUESTIONS 1-9: 10
5. POOR APPETITE OR OVEREATING: NOT AT ALL

## 2021-09-04 ASSESSMENT — ANXIETY QUESTIONNAIRES: GAD7 TOTAL SCORE: 0

## 2021-09-27 ENCOUNTER — VIRTUAL VISIT (OUTPATIENT)
Dept: FAMILY MEDICINE | Facility: CLINIC | Age: 26
End: 2021-09-27
Payer: COMMERCIAL

## 2021-09-27 ENCOUNTER — TELEPHONE (OUTPATIENT)
Dept: FAMILY MEDICINE | Facility: CLINIC | Age: 26
End: 2021-09-27

## 2021-09-27 DIAGNOSIS — F32.A DEPRESSION, UNSPECIFIED DEPRESSION TYPE: Primary | ICD-10-CM

## 2021-09-27 DIAGNOSIS — G25.81 RESTLESS LEGS SYNDROME (RLS): ICD-10-CM

## 2021-09-27 DIAGNOSIS — F64.0 GENDER DYSPHORIA IN ADOLESCENT AND ADULT: ICD-10-CM

## 2021-09-27 DIAGNOSIS — R11.0 NAUSEA: ICD-10-CM

## 2021-09-27 DIAGNOSIS — F90.9 ATTENTION DEFICIT HYPERACTIVITY DISORDER (ADHD), UNSPECIFIED ADHD TYPE: ICD-10-CM

## 2021-09-27 PROCEDURE — 99213 OFFICE O/P EST LOW 20 MIN: CPT | Mod: 95 | Performed by: STUDENT IN AN ORGANIZED HEALTH CARE EDUCATION/TRAINING PROGRAM

## 2021-09-27 RX ORDER — DEXTROAMPHETAMINE SACCHARATE, AMPHETAMINE ASPARTATE MONOHYDRATE, DEXTROAMPHETAMINE SULFATE AND AMPHETAMINE SULFATE 5; 5; 5; 5 MG/1; MG/1; MG/1; MG/1
40 CAPSULE, EXTENDED RELEASE ORAL DAILY
Qty: 60 CAPSULE | Refills: 0 | Status: SHIPPED | OUTPATIENT
Start: 2021-09-27 | End: 2021-11-02

## 2021-09-27 RX ORDER — QUINIDINE GLUCONATE 324 MG
TABLET, EXTENDED RELEASE ORAL DAILY
COMMUNITY

## 2021-09-27 NOTE — PATIENT INSTRUCTIONS
Patient Education   Here is the plan from today's visit    Adderall XR 40mg daily - consider a med change if we still can't find the balance between appetite suppression and targeting focus    Keep working on other strategies to support focus -- imposing some of that structure you have at work into unstructured time       Re: nausea and headaches -- not totally sure why you're dealing with this. We talked about targeting possible triggers/causes like hydration status, and consider a small snack (Saltine or something else bland) or at least paying attention to appetite/recent food intake. Could be related to Adderall change. If not getting better with time, we can try and look into it more.       1. Depression, unspecified depression type    2. Attention deficit hyperactivity disorder (ADHD), unspecified ADHD type  - amphetamine-dextroamphetamine (ADDERALL XR) 20 MG 24 hr capsule; Take 2 capsules (40 mg) by mouth daily  Dispense: 60 capsule; Refill: 0    3. Gender dysphoria in adolescent and adult    4. Restless legs syndrome (RLS)    5. Nausea      Please call or return to clinic if your symptoms don't go away.    Follow up plan  Return in about 3 months (around 12/27/2021) for Follow up - sooner if you need     Thank you for coming to [facility] today.  COVID-19 Vaccine:  [facility] has walk-in appointments for COVID-19 vaccines. No appointment needed!   You also have the option of receiving Moderna vaccine during your physician appointment. Please ask your care team for more information!  Lab Testing:  **If you had lab testing today and your results are reassuring or normal they will be mailed to you or sent through [employer] within 7 days.   **If the lab tests need quick action we will call you with the results.  **If you are having labs done on a different day, please call [phone] to schedule at [facility] or [phone] for other [facility] locations.   The phone number  we will call with results is # [phone] (home) [phone] (work). If this is not the best number please call our clinic and change the number.  Medication Refills:  If you need any refills please call your pharmacy and they will contact us.   If you need to  your refill at a new pharmacy, please contact the new pharmacy directly. The new pharmacy will help you get your medications transferred faster.   Scheduling:  If you have any concerns about today's visit or wish to schedule another appointment please call our office during normal business hours [phone] (8-5:00 M-F)  If a referral was made to a [facility] and you don't get a call from central scheduling please call [phone].  If a Mammogram was ordered for you at [facility] call [phone] to schedule or change your appointment.  If you had an EKG/XRay/CT/Ultrasound/MRI ordered the number is [phone] to schedule or change your radiology appointment.   Medical Concerns:  If you have urgent medical concerns please call [phone] at any time of the day.    Sofy Hudson,

## 2021-09-27 NOTE — PROGRESS NOTES
Ilan is a 26 year old who is being evaluated via a billable video visit.      How would you like to obtain your AVS? [email or web address]     Video Start Time: 11:00AM    Assessment & Plan     Depression, unspecified depression type  Improved on increased mirtazapine. Will continue current dose for now.     Attention deficit hyperactivity disorder (ADHD), unspecified ADHD type  Had decreased stimulant w/ improved appetite but worsening focus. ADHD sx not improved w/ improved depression control.  Discussed non-pharm strategies including regular exercise, good sleep and sleep hygeine, and imposing some structure/strategizing ways to create a more focused environment in free time. Discussed options - will increase dose slightly to 40mg to see if we can find a balance between appetite and symptom control.   - amphetamine-dextroamphetamine (ADDERALL XR) 20 MG 24 hr capsule  Dispense: 60 capsule; Refill: 0  - consider different stimulant trial if unable to find an effective dose     Gender dysphoria in adolescent and adult  Stable, no changes to meds     Restless legs syndrome (RLS)  Improved on iron supplementation - I don't see a need to repeat ferritin level unless sx worsening on iron     Nausea  Differential broad. No red flags. Discussed targeting possible triggers/causes - hydration status, consider a snack or at least paying attention to appetite/recent food intake, possibly r/t recent change in Adderall dosing. Will CTM and consider further eval if persistent worsening symptoms.       Ordering of each unique test  Prescription drug management  32 minutes spent on the date of the encounter doing chart review, history and exam, documentation and further activities per the note      Return in about 3 months (around 12/27/2021) for Follow up.    Sofy Hudson DO  [facility] TERRANCE    Kameron Talavera is a 26 year old who presents for the following health issues     HPI     ADHD  Last visit: consider  "concerta or vyvanse     Depression and Anxiety Follow-Up  9/3/21:  Will maximize mirtazepine to see if effective. If no improvement 4-6 weeks after dose increase, would cross taper. Could consider SNRI or a different selective serotonin reuptake inhibitor. Could also consider addressing adhd meds at that time.       How are you doing with your depression since your last visit? Improved - does feel like increasing the mirtazapine has been helping. Doesn't quite feel 100% yet. Something that might improve with time.      Went down to 30mg daily of the Adderall - has helped w/ making herself eat more consistently but now really hard to focus again. Not really sure if there's a better solution for that.     Still trying to get back to consistent workouts. Currently managing one or maybe two a week. Goal is 3.     Works afternoon/evening shifts until 1030-11p - decompress until 1 or 2 am, wake up at 11a. Easier to focus at work. Has noticed that the times she feels most energized and able to focus are when she has specific motivations and a timeline in which they have to get done. 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 It usually happens around dinner time but can happen at any time in the day, I'm starting to get frustrated with it and don't know what to do. I haven't been able to pin down what causes it. I know we have a meeting in 5 " "days but wanted to say something in case I forget to in the meeting.      Review of Systems   Dizziness - new: usually in the evening. Sometimes lasts for a while - to the point that sometimes I just want to go to sleep so I don't have to deal w/ it. Sometimes associated nausea. Sometimes associated headache - not sharp, but chronic enough that it is distracting.   Describes as: sensation of mild nausea, deals w/ by not looking at the screen, putting head down. Describes as less like \"I'm going to throw up\" more like back of the neck/whole body discomfort that rest of body feels tense. Usually has something like a headache - more waves of \"this doesn't feel good.\"   - notices more when at home - socializing w/ people online. Notices less at work. Has a lot of screen time on most days, screen set w/ blue light filters.   - headaches aren't new, maybe seem less     Pertinent positives and negatives per HPI.        Objective           Vitals:  No vitals were obtained today due to virtual visit.    Physical Exam   GENERAL: Healthy, alert and no distress  EYES: Eyes grossly normal to inspection.  No discharge or erythema, or obvious scleral/conjunctival abnormalities.  RESP: No audible wheeze, cough, or visible cyanosis.  No visible retractions or increased work of breathing.    SKIN: Visible skin clear. No significant rash, abnormal pigmentation or lesions.  NEURO: Cranial nerves grossly intact.  Mentation and speech appropriate for age.  PSYCH: Mentation appears normal, affect normal/bright, judgement and insight intact, normal speech and appearance well-groomed.      Video-Visit Details    Type of service:  Video Visit    Video End Time:11:24AM    Originating Location (pt. Location): Home    Distant Location (provider location):  [facility]     Platform used for Video Visit: Julian"

## 2021-09-27 NOTE — TELEPHONE ENCOUNTER
Prior Authorization Retail Medication Request    Medication/Dose: amphetamine-dextroamphetamine (ADDERALL XR) 20 MG 24 hr capsule  ICD code (if different than what is on RX):  Attention deficit hyperactivity disorder (ADHD), unspecified ADHD type [F90.9]   Previously Tried and Failed:  See chart  Rationale:  See chart    Insurance Name:  [employer] [place]  Insurance ID:  [id number]       Pharmacy Information (if different than what is on RX)  Name:  [facility]  Phone:  [phone]

## 2021-09-28 NOTE — TELEPHONE ENCOUNTER
PA Initiation    Medication: amphetamine-dextroamphetamine (ADDERALL XR) 20 MG 24 hr cap- INITIATED  Insurance Company: [employer] Plus [employer]AP - Phone [phone] Fax [phone]  Pharmacy Filling the Rx: [place], [place] - [address] [place] [address] [address]  Filling Pharmacy Phone: [phone]  Filling Pharmacy Fax: [phone]  Start Date: 9/28/2021

## 2021-09-29 NOTE — TELEPHONE ENCOUNTER
Prior Authorization Approval    Authorization Effective Date: 1/21/2021  Authorization Expiration Date: 4/21/2022  Medication: ADDERALL XR 20 MG 24 hr cap- approved  Approved Dose/Quantity: 60 CAPS  Reference #: [id number]   Insurance Company: [employer] Plus [employer]AP - Phone [phone] Fax [phone]  Expected CoPay:       CoPay Card Available:      Foundation Assistance Needed:    Which Pharmacy is filling the prescription (Not needed for infusion/clinic administered): [facility] - [place], [place] - [address] [place] [address] [address]  Pharmacy Notified: Yes, pharmacy has a paid claim  Patient Notified: Pharmacy will notify patient when ready

## 2021-10-09 ENCOUNTER — HEALTH MAINTENANCE LETTER (OUTPATIENT)
Age: 26
End: 2021-10-09

## 2021-11-02 DIAGNOSIS — F90.9 ATTENTION DEFICIT HYPERACTIVITY DISORDER (ADHD), UNSPECIFIED ADHD TYPE: ICD-10-CM

## 2021-11-02 DIAGNOSIS — F32.A DEPRESSION, UNSPECIFIED DEPRESSION TYPE: ICD-10-CM

## 2021-11-02 RX ORDER — DEXTROAMPHETAMINE SACCHARATE, AMPHETAMINE ASPARTATE MONOHYDRATE, DEXTROAMPHETAMINE SULFATE AND AMPHETAMINE SULFATE 5; 5; 5; 5 MG/1; MG/1; MG/1; MG/1
40 CAPSULE, EXTENDED RELEASE ORAL DAILY
Qty: 60 CAPSULE | Refills: 0 | Status: SHIPPED | OUTPATIENT
Start: 2021-11-02 | End: 2021-11-12

## 2021-11-02 RX ORDER — MIRTAZAPINE 45 MG/1
45 TABLET, FILM COATED ORAL AT BEDTIME
Qty: 30 TABLET | Refills: 5 | Status: SHIPPED | OUTPATIENT
Start: 2021-11-02 | End: 2022-05-10

## 2021-11-02 NOTE — TELEPHONE ENCOUNTER
"Request for medication refill:amphetamine-dextroamphetamine (ADDERALL XR) 20 MG 24 hr capsule AND Mirtazapine 30mg tablet - Mirtazapine dosage different from medication pended - PLEASE ADVISE.     Providers if patient needs an appointment and you are willing to give a one month supply please refill for one month and  send a letter/MyChart using \".SMILLIMITEDREFILL\" .smillimited and route chart to \"P SMI \" (Giving one month refill in non controlled medications is strongly recommended before denial)    If refill has been denied, meaning absolutely no refills without visit, please complete the smart phrase \".smirxrefuse\" and route it to the \"P SMI MED REFILLS\"  pool to inform the patient and the pharmacy.    Ana Chappell        "

## 2021-11-12 ENCOUNTER — OFFICE VISIT (OUTPATIENT)
Dept: FAMILY MEDICINE | Facility: CLINIC | Age: 26
End: 2021-11-12
Payer: COMMERCIAL

## 2021-11-12 VITALS
SYSTOLIC BLOOD PRESSURE: 109 MMHG | HEART RATE: 134 BPM | OXYGEN SATURATION: 98 % | TEMPERATURE: 98.2 F | RESPIRATION RATE: 16 BRPM | WEIGHT: 172 LBS | BODY MASS INDEX: 23.99 KG/M2 | DIASTOLIC BLOOD PRESSURE: 70 MMHG

## 2021-11-12 DIAGNOSIS — F32.A DEPRESSION, UNSPECIFIED DEPRESSION TYPE: ICD-10-CM

## 2021-11-12 DIAGNOSIS — F90.9 ATTENTION DEFICIT HYPERACTIVITY DISORDER (ADHD), UNSPECIFIED ADHD TYPE: ICD-10-CM

## 2021-11-12 DIAGNOSIS — R68.82 LOW LIBIDO: ICD-10-CM

## 2021-11-12 DIAGNOSIS — R00.0 TACHYCARDIA: ICD-10-CM

## 2021-11-12 DIAGNOSIS — G25.81 RESTLESS LEGS SYNDROME (RLS): ICD-10-CM

## 2021-11-12 DIAGNOSIS — F64.0 GENDER DYSPHORIA IN ADOLESCENT AND ADULT: Primary | ICD-10-CM

## 2021-11-12 LAB
ESTRADIOL SERPL-MCNC: 128 PG/ML
FERRITIN SERPL-MCNC: 31 NG/ML (ref 12–388)
TSH SERPL DL<=0.005 MIU/L-ACNC: 0.86 MU/L (ref 0.4–4)

## 2021-11-12 PROCEDURE — 82728 ASSAY OF FERRITIN: CPT | Performed by: STUDENT IN AN ORGANIZED HEALTH CARE EDUCATION/TRAINING PROGRAM

## 2021-11-12 PROCEDURE — 82670 ASSAY OF TOTAL ESTRADIOL: CPT | Performed by: STUDENT IN AN ORGANIZED HEALTH CARE EDUCATION/TRAINING PROGRAM

## 2021-11-12 PROCEDURE — 99215 OFFICE O/P EST HI 40 MIN: CPT | Performed by: STUDENT IN AN ORGANIZED HEALTH CARE EDUCATION/TRAINING PROGRAM

## 2021-11-12 PROCEDURE — 36415 COLL VENOUS BLD VENIPUNCTURE: CPT | Performed by: STUDENT IN AN ORGANIZED HEALTH CARE EDUCATION/TRAINING PROGRAM

## 2021-11-12 PROCEDURE — 84443 ASSAY THYROID STIM HORMONE: CPT | Performed by: STUDENT IN AN ORGANIZED HEALTH CARE EDUCATION/TRAINING PROGRAM

## 2021-11-12 PROCEDURE — 84403 ASSAY OF TOTAL TESTOSTERONE: CPT | Performed by: STUDENT IN AN ORGANIZED HEALTH CARE EDUCATION/TRAINING PROGRAM

## 2021-11-12 RX ORDER — METHYLPHENIDATE HYDROCHLORIDE 36 MG/1
36 TABLET ORAL EVERY MORNING
Qty: 30 TABLET | Refills: 0 | Status: SHIPPED | OUTPATIENT
Start: 2021-11-12 | End: 2021-11-17

## 2021-11-12 RX ORDER — ESTRADIOL VALERATE 20 MG/ML
3 INJECTION INTRAMUSCULAR
Qty: 5 ML | Refills: 0 | Status: SHIPPED | OUTPATIENT
Start: 2021-11-12 | End: 2022-05-10

## 2021-11-12 NOTE — PATIENT INSTRUCTIONS
Patient Education   Here is the plan from today's visit    ADHD: Stop Adderall. Start Concerta 36mg once daily (extended release - once daily in AM)   -- monitor appetite suppression, time spent on food/eating, and ability to focus    Hormones: Stop progesterone. Decrease estradiol to 0.15mL (3mg) weekly from 4mg. Stay on finasteride.     Check labs today - hormones, thyroid, iron level.     We will follow up on heart rate at next visit and do more workup if still fast.         Please call or return to clinic if your symptoms don't go away.    Follow up plan  Return in about 6-8 weeks (around 12/24/2021).    Thank you for coming to [facility] today.  Lab Testing:  **If you had lab testing today and your results are reassuring or normal they will be mailed to you or sent through [employer] within 7 days.   **If the lab tests need quick action we will call you with the results.  **If you are having labs done on a different day, please call [phone] to schedule at [facility] or [phone] for other [facility] locations. Labs do not offer walk-in appointments.  The phone number we will call with results is # [phone] (home) [phone] (work). If this is not the best number please call our clinic and change the number.  Medication Refills:  If you need any refills please call your pharmacy and they will contact us.   If you need to  your refill at a new pharmacy, please contact the new pharmacy directly. The new pharmacy will help you get your medications transferred faster.   Scheduling:  If you have any concerns about today's visit or wish to schedule another appointment please call our office during normal business hours [phone] (8-5:00 M-F)  If a referral was made to a [facility] and you don't get a call from central scheduling please call [phone].  If a Mammogram was ordered for you at [facility] call [phone] to schedule or change your  appointment.  If you had an EKG/XRay/CT/Ultrasound/MRI ordered the number is [phone] to schedule or change your radiology appointment.   [facility] has limited ultrasound appointments available on Wednesdays, if you would like your ultrasound at [facility], please call [phone] to schedule.   Medical Concerns:  If you have urgent medical concerns please call [phone] at any time of the day.    Sofy Hudson,

## 2021-11-12 NOTE — PROGRESS NOTES
"  Assessment & Plan     Gender dysphoria in adolescent and adult  Low libido  Shared decision making-will decrease estradiol dose given last level mid 200s.  Unsure if there are any finasteride alternatives that would still provide hair loss benefit w less erectile dysfunction.  - Estradiol  - Testosterone total  - estradiol valerate (DELESTROGEN) 20 MG/ML injection  Dispense: 5 mL; Refill: 0  - Estradiol  - Testosterone total    Depression, unspecified depression type  Low mood, likely multifactorial.  PHQ-9 11.  Other changes as above and below.  -Continue mirtazapine 45 mg nightly    Attention deficit hyperactivity disorder (ADHD), unspecified ADHD type  Low appetite   Has struggled with substantial appetite suppression on Adderall XR despite dose decrease.  Discussed stimulant versus nonstimulant options like Strattera.  We looked at side effects he had appetite suppression as well possibility of Strattera.  Shared decision making around med choice and will switch to Concerta.  Has not tolerated bupropion in the past.  - methylphenidate (CONCERTA) 36 MG CR tablet  Dispense: 30 tablet; Refill: 0    Tachycardia  Heart rate 134 on morning.  88 on my check later on in the visit.  At times does notice heart racing fast but no chest discomfort.  Does not wear any kind of smart watch her heart rate tracker.  Stimulant changes as above. If persistently tachycardic at next visit, EKG and consider Zio patch.  Notes family history of \"total autonomic failure \"in father diagnosed at [place].  - TSH with free T4 reflex  - TSH with free T4 reflex    Restless legs syndrome (RLS)  Improved on intermittent oral iron.  - Ferritin  - Ferritin      Diagnosis or treatment significantly limited by social determinants of health - gender identity  Ordering of each unique test  Prescription drug management  44 minutes spent on the date of the encounter doing chart review, history and exam, documentation and further activities per the " "note  =    Return in about 6 weeks (around 12/24/2021).    DO TANIA Snowden [facility] TERRANCE Talavera is a 26 year old who presents for the following health issues      Chief Complaint   Patient presents with     Recheck Medication     med check and cough. not persistent but sometimes pt will have a cough for a half hour.. every 3 days or so. Been going on for about a week.         HPI     \"Appetite is becoming a debilitating problem.\" Had to try and force herself to eat stuff - picked at a meal for 3h. \"if the food is right in front of me and I can't eat it, all the time I spend not eating it is wasted anyway.\"     \"My libido is kind of gone and I resent that. Sexual empowerment is something I got a lot of empowerment out of. Even to the point of impotence sometimes whichis doubly frustrating.\"     \"\"Amount of energy needed to get my body warmed up is significantly higher. Have to get blood flowing, oil the hinges\"     was sick two weeks ago for the weekend, had a bad throat cold and now I have a cough that won't go away. Every few hours I have to cough and even while talking sometimes I can't finish a sentence without coughing. I have caught myself in depressed thoughts still. Very negative feelings and low energy, I don't know what to do but I figured it's worth a mention, the higher dose of the mirtazipine has helped but it might not be enough.    Finasteride - takes for hair loss. Most dysphoria-inducing thing she has at the moment. Hair transplant tentatively moving forward a bit. [employer] insurance - current plan may not be an option.         Depression and Anxiety Follow-Up  Energy levels up and down a lot   Sleeping okay   Trying to work out twice a week.   Some days it's really hard to get the ball rolling. If she can't get it rolling, feels like the day just gets away from her. Feels like she can't engage w/ her motivation as often as she'd like to.   Getting a little better " w/ finding places to socialize - social energy.     meds tried: Has tried sertraline, fluoxetine, mirtazepine,   Gabapentin for panic attack as needed years ago   Bupropion allergy listed     PHQ 5/20/2021 6/25/2021 9/3/2021   PHQ-9 Total Score 11 9 10   Q9: Thoughts of better off dead/self-harm past 2 weeks Not at all Not at all Not at all     TOMÁS-7 SCORE 6/25/2021 9/3/2021   Total Score 7 0          Review of Systems   Pertinent positives and negatives per HPI.    At times can notice that heart is beating fast.  Does not really feel uncomfortable.  Denies any chest discomfort, chest pain, dyspnea.  No lightheadedness      Objective    /70   Pulse (!) 134   Temp 98.2  F (36.8  C) (Oral)   Resp 16   Wt 78 kg (172 lb)   SpO2 98%   BMI 23.99 kg/m    Body mass index is 23.99 kg/m .   HR 88 bpm on my recheck   Physical Exam   GENERAL: healthy, alert and no distress  RESP: lungs clear to auscultation - no rales, rhonchi or wheezes  CV: regular rate and rhythm, normal S1 S2, no S3 or S4, no murmur, click or rub, peripheral pulses strong  MS: no gross musculoskeletal defects noted, no edema  NEURO: Normal strength and tone, mentation intact and speech normal  PSYCH: mentation appears normal, affect normal/bright

## 2021-11-15 ENCOUNTER — TELEPHONE (OUTPATIENT)
Dept: FAMILY MEDICINE | Facility: CLINIC | Age: 26
End: 2021-11-15
Payer: COMMERCIAL

## 2021-11-15 NOTE — TELEPHONE ENCOUNTER
Prior Authorization Retail Medication Request    Medication/Dose: methylphenidate (CONCERTA) 36 MG CR tablet  ICD code (if different than what is on RX):  Attention deficit hyperactivity disorder (ADHD), unspecified ADHD type [F90.9]   Previously Tried and Failed:  See chart  Rationale:  See chart    Insurance Name:  BLUE PLUS  Insurance ID:  [id number]      Pharmacy Information (if different than what is on RX)  Name:  [facility]  Phone:  [phone]

## 2021-11-16 LAB — TESTOST SERPL-MCNC: 12 NG/DL (ref 8–950)

## 2021-11-16 NOTE — TELEPHONE ENCOUNTER
PA Initiation    Medication: methylphenidate (CONCERTA) 36 MG CR tablet  Insurance Company: [employer] Plus PMAP - Phone [phone] Fax [phone]  Pharmacy Filling the Rx: [address]  Filling Pharmacy Phone: [phone]  Filling Pharmacy Fax: [phone]  Start Date: 11/16/2021

## 2021-11-17 NOTE — TELEPHONE ENCOUNTER
Patient's insurance covers brand name Concerta. Spoke with pharmacy and they do not stock the brand name. Patient will need to get this filled at a different pharmacy. Please contact patient and relay this information.

## 2021-11-19 NOTE — TELEPHONE ENCOUNTER
Left message for patient informing we would need to send Rx to another pharmacy. Requested call back or [employer] message with alternate pharmacy information to send Methylphenidate to.

## 2022-01-19 ENCOUNTER — IMMUNIZATION (OUTPATIENT)
Dept: NURSING | Facility: CLINIC | Age: 27
End: 2022-01-19
Payer: COMMERCIAL

## 2022-01-19 PROCEDURE — 0054A COVID-19,PF,PFIZER (12+ YRS): CPT

## 2022-01-19 PROCEDURE — 90686 IIV4 VACC NO PRSV 0.5 ML IM: CPT

## 2022-01-19 PROCEDURE — 90471 IMMUNIZATION ADMIN: CPT

## 2022-01-19 PROCEDURE — 91305 COVID-19,PF,PFIZER (12+ YRS): CPT

## 2022-02-08 ENCOUNTER — LAB (OUTPATIENT)
Dept: LAB | Facility: CLINIC | Age: 27
End: 2022-02-08
Payer: COMMERCIAL

## 2022-02-08 DIAGNOSIS — F64.0 GENDER DYSPHORIA IN ADOLESCENT AND ADULT: ICD-10-CM

## 2022-02-08 LAB — ESTRADIOL SERPL-MCNC: 162 PG/ML

## 2022-02-08 PROCEDURE — 36415 COLL VENOUS BLD VENIPUNCTURE: CPT

## 2022-02-08 PROCEDURE — 84403 ASSAY OF TOTAL TESTOSTERONE: CPT

## 2022-02-08 PROCEDURE — 82670 ASSAY OF TOTAL ESTRADIOL: CPT

## 2022-02-11 LAB — TESTOST SERPL-MCNC: 40 NG/DL (ref 8–950)

## 2022-02-16 ENCOUNTER — OFFICE VISIT (OUTPATIENT)
Dept: FAMILY MEDICINE | Facility: CLINIC | Age: 27
End: 2022-02-16
Payer: COMMERCIAL

## 2022-02-16 VITALS
OXYGEN SATURATION: 95 % | WEIGHT: 173.8 LBS | HEART RATE: 100 BPM | BODY MASS INDEX: 24.33 KG/M2 | HEIGHT: 71 IN | SYSTOLIC BLOOD PRESSURE: 114 MMHG | TEMPERATURE: 97.9 F | DIASTOLIC BLOOD PRESSURE: 64 MMHG | RESPIRATION RATE: 16 BRPM

## 2022-02-16 DIAGNOSIS — F64.0 GENDER DYSPHORIA IN ADOLESCENT AND ADULT: ICD-10-CM

## 2022-02-16 DIAGNOSIS — M25.561 ACUTE PAIN OF RIGHT KNEE: Primary | ICD-10-CM

## 2022-02-16 DIAGNOSIS — F90.9 ATTENTION DEFICIT HYPERACTIVITY DISORDER (ADHD), UNSPECIFIED ADHD TYPE: ICD-10-CM

## 2022-02-16 DIAGNOSIS — F32.A DEPRESSION, UNSPECIFIED DEPRESSION TYPE: ICD-10-CM

## 2022-02-16 PROCEDURE — 99214 OFFICE O/P EST MOD 30 MIN: CPT | Performed by: STUDENT IN AN ORGANIZED HEALTH CARE EDUCATION/TRAINING PROGRAM

## 2022-02-16 NOTE — Clinical Note
Hi PharmDs! My note isn't done yet but this pt is interseted in and a good candidate for [employer] testing -- many med trials, both antidepressants and now stimulants. I don't initiate this process very often - anything else I need to do to help facilitate? She is hoping for a mychart over a phone call if possible.     Thanks!  Sofy

## 2022-02-16 NOTE — PROGRESS NOTES
Assessment & Plan     Acute pain of right knee  Knee strain, possible meniscus injury. No instability on exam. Exercise/activity important aspect of mental health and physical health care.   - Physical Therapy Referral    Attention deficit hyperactivity disorder (ADHD), unspecified ADHD type  Depression, unspecified depression type  Plateau on mirtazepine/maybe a bit worse. Has trialed multiple other psychotropics. No acute SI or other red flags.   - PharmD to reach out to arrange for [employer] testing to guide medication choice   - continue mirtazepine and adderall until we can review results     Orgasm dysfunction  Possibly multifactorial - hormone meds and mental health meds could both be at play. HRT changes as below, consider Urology referral vs sexual health therapy if persistent     Gender dysphoria in adolescent and adult  Feminizing HRT - estradiol, finasteride. See AVS       Diagnosis or treatment significantly limited by social determinants of health - gender identity  Ordering of each unique test  Prescription drug management  40 minutes spent on the date of the encounter doing chart review, history and exam, documentation and further activities per the note     Return in about 4 weeks (around 3/16/2022).    Sofy Hudson, [facility] TERRANCE Talavera is a 26 year old who presents for the following health issues     Chief Complaint   Patient presents with     RECHECK     f/u meds adderal. would like to discuss dosage      Refill Request     adderal         HPI       Sexual dysfunction  Skips peak of orgasm. Some anejaculation. w masturbation as well. Bothersome - really enjoys those aspects of sex.    R knee:   x2 mos. Doesn't hurt w/  Ranging, but does hurt w/ lateral / varus pressure. Sometimes painful w/ full flexion. No preceding injury, no big change in workout. Is running/walking less.   - squats are fine.   - no knee swelling   - no hx of knee probs     Works out  "2-3x/week - squats, lunges, pushups/pullups, jumping jacks/warmups, stretching at the end       Depression and Anxiety Follow-Up    How are you doing with your depression since your last visit? Worsened - just not feeling well past several weeks. Winter, decreased physical activity.     Sleep: either sleeps too much or hard time falling asleep.     Very few days of focus even w/ stimulants. Few days with desire to do anything - lack of interest or motivation.     Had a great day last week. Had gone to bed after something good the night beofre. Back-to-back pleasant experiences.     Taking community classes - capable of paying attention there. [facility] ed.     Social History     Tobacco Use     Smoking status: Never Smoker     Smokeless tobacco: Never Used   Vaping Use     Vaping Use: Never used   Substance Use Topics     Alcohol use: Not Currently     Drug use: Not Currently     PHQ 6/25/2021 9/3/2021 2/17/2022   PHQ-9 Total Score 9 10 15   Q9: Thoughts of better off dead/self-harm past 2 weeks Not at all Not at all Not at all     TOMÁS-7 SCORE 6/25/2021 9/3/2021 2/17/2022   Total Score 7 0 4   Review of Systems   Pertinent positives and negatives per HPI.        Objective    /64   Pulse 100   Temp 97.9  F (36.6  C) (Oral)   Resp 16   Ht 1.803 m (5' 11\")   Wt 78.8 kg (173 lb 12.8 oz)   SpO2 95%   BMI 24.24 kg/m    Body mass index is 24.24 kg/m .  Physical Exam   GENERAL: alert, cooperative, in no acute distress  HEENT: sclera clear, moist mucous membranes   PULM: normal respiratory effort   CV: regular rate, extremities warm and well perfused   MSK: right knee:  - Inspections: No deformities. No muscle atrophy. No erythema, warmth, or effusion.   - Tenderness to palpation: lateral joint line, over lateral collateral ligament/meniscus  - AROM: full flexion and extension. Normal patellofemoral tracking.   - Strength: intact  - Special tests:    - Lachman test negative   - No laxity with valgus and " varus strain. Some discomfort w/ varus.  NEURO: alert and oriented, grossly intact, moves all extremities, normal gait   SKIN: no rashes or lesions visualized   PSYCH: euthymic affect

## 2022-02-17 ASSESSMENT — ANXIETY QUESTIONNAIRES
GAD7 TOTAL SCORE: 4
5. BEING SO RESTLESS THAT IT IS HARD TO SIT STILL: NOT AT ALL
1. FEELING NERVOUS, ANXIOUS, OR ON EDGE: SEVERAL DAYS
7. FEELING AFRAID AS IF SOMETHING AWFUL MIGHT HAPPEN: NOT AT ALL
2. NOT BEING ABLE TO STOP OR CONTROL WORRYING: SEVERAL DAYS
6. BECOMING EASILY ANNOYED OR IRRITABLE: NOT AT ALL
IF YOU CHECKED OFF ANY PROBLEMS ON THIS QUESTIONNAIRE, HOW DIFFICULT HAVE THESE PROBLEMS MADE IT FOR YOU TO DO YOUR WORK, TAKE CARE OF THINGS AT HOME, OR GET ALONG WITH OTHER PEOPLE: NOT DIFFICULT AT ALL
3. WORRYING TOO MUCH ABOUT DIFFERENT THINGS: SEVERAL DAYS

## 2022-02-17 ASSESSMENT — PATIENT HEALTH QUESTIONNAIRE - PHQ9
5. POOR APPETITE OR OVEREATING: SEVERAL DAYS
SUM OF ALL RESPONSES TO PHQ QUESTIONS 1-9: 15

## 2022-02-18 DIAGNOSIS — F90.9 ATTENTION DEFICIT HYPERACTIVITY DISORDER (ADHD), UNSPECIFIED ADHD TYPE: ICD-10-CM

## 2022-02-18 RX ORDER — DEXTROAMPHETAMINE SACCHARATE, AMPHETAMINE ASPARTATE, DEXTROAMPHETAMINE SULFATE AND AMPHETAMINE SULFATE 1.25; 1.25; 1.25; 1.25 MG/1; MG/1; MG/1; MG/1
5 TABLET ORAL DAILY
Qty: 30 TABLET | Refills: 0 | Status: SHIPPED | OUTPATIENT
Start: 2022-02-18 | End: 2022-03-31

## 2022-02-18 ASSESSMENT — ANXIETY QUESTIONNAIRES: GAD7 TOTAL SCORE: 4

## 2022-02-18 NOTE — TELEPHONE ENCOUNTER
Last visit date: 2/16/22  Visit schedule:every 6 months  Next visit due: 8/2022  PDMP Review       Value Time User    State PDMP site checked  Yes 2/18/2022  5:16 PM Sara Skinner DO          Last moiwmt42 day supply on 1/18/22.   Refill is appropriate.     Sara Skinner DO

## 2022-02-18 NOTE — TELEPHONE ENCOUNTER
"Request for medication refill: amphetamine-dextroamphetamine (ADDERALL) 5 MG tablet    Providers if patient needs an appointment and you are willing to give a one month supply please refill for one month and  send a letter/MyChart using \".SMILLIMITEDREFILL\" .smillimited and route chart to \"P [facility] \" (Giving one month refill in non controlled medications is strongly recommended before denial)    If refill has been denied, meaning absolutely no refills without visit, please complete the smart phrase \".smirxrefuse\" and route it to the \"P SMI MED REFILLS\"  pool to inform the patient and the pharmacy.    Ana Chappell        "

## 2022-02-23 ENCOUNTER — TELEPHONE (OUTPATIENT)
Dept: PHARMACY | Facility: CLINIC | Age: 27
End: 2022-02-23
Payer: COMMERCIAL

## 2022-02-23 DIAGNOSIS — F32.A DEPRESSION, UNSPECIFIED DEPRESSION TYPE: Primary | ICD-10-CM

## 2022-02-23 NOTE — TELEPHONE ENCOUNTER
PHARMACY TELEPHONE ENCOUNTER:    Reason: spoke to patient regarding pharmacogenetic therapy.      Past med experience:  Has taken mirtazapine for some time  Fluoxetine - had side effects  Other SSRIs that Ilan is not sure of the name.      Gabapentin in the past for anxiety: which caused drowsiness.      Ilan's specific concern regarding pharmacogenetics and mental health medications is the concern about resistance with long term use of anti-depressants,      A/P  Patient agrees to start PGx testing.      Samson Rodriguez PharmHerreraD.

## 2022-03-14 ENCOUNTER — THERAPY VISIT (OUTPATIENT)
Dept: PHYSICAL THERAPY | Facility: CLINIC | Age: 27
End: 2022-03-14
Attending: STUDENT IN AN ORGANIZED HEALTH CARE EDUCATION/TRAINING PROGRAM
Payer: COMMERCIAL

## 2022-03-14 DIAGNOSIS — M25.561 ACUTE PAIN OF RIGHT KNEE: ICD-10-CM

## 2022-03-14 PROCEDURE — 97110 THERAPEUTIC EXERCISES: CPT | Mod: GP | Performed by: PHYSICAL THERAPIST

## 2022-03-14 PROCEDURE — 97161 PT EVAL LOW COMPLEX 20 MIN: CPT | Mod: GP | Performed by: PHYSICAL THERAPIST

## 2022-03-14 ASSESSMENT — ACTIVITIES OF DAILY LIVING (ADL)
SWELLING: I DO NOT HAVE THE SYMPTOM
SQUAT: ACTIVITY IS NOT DIFFICULT
GIVING WAY, BUCKLING OR SHIFTING OF KNEE: I DO NOT HAVE THE SYMPTOM
KNEEL ON THE FRONT OF YOUR KNEE: ACTIVITY IS NOT DIFFICULT
HOW_WOULD_YOU_RATE_THE_OVERALL_FUNCTION_OF_YOUR_KNEE_DURING_YOUR_USUAL_DAILY_ACTIVITIES?: NEARLY NORMAL
RISE FROM A CHAIR: ACTIVITY IS NOT DIFFICULT
KNEE_ACTIVITY_OF_DAILY_LIVING_SUM: 70
LIMPING: I DO NOT HAVE THE SYMPTOM
WEAKNESS: I DO NOT HAVE THE SYMPTOM
GO DOWN STAIRS: ACTIVITY IS NOT DIFFICULT
SIT WITH YOUR KNEE BENT: ACTIVITY IS NOT DIFFICULT
STAND: ACTIVITY IS NOT DIFFICULT
AS_A_RESULT_OF_YOUR_KNEE_INJURY,_HOW_WOULD_YOU_RATE_YOUR_CURRENT_LEVEL_OF_DAILY_ACTIVITY?: NEARLY NORMAL
PAIN: I DO NOT HAVE THE SYMPTOM
KNEE_ACTIVITY_OF_DAILY_LIVING_SCORE: 100
STIFFNESS: I DO NOT HAVE THE SYMPTOM
GO UP STAIRS: ACTIVITY IS NOT DIFFICULT
RAW_SCORE: 70
WALK: ACTIVITY IS NOT DIFFICULT
HOW_WOULD_YOU_RATE_THE_CURRENT_FUNCTION_OF_YOUR_KNEE_DURING_YOUR_USUAL_DAILY_ACTIVITIES_ON_A_SCALE_FROM_0_TO_100_WITH_100_BEING_YOUR_LEVEL_OF_KNEE_FUNCTION_PRIOR_TO_YOUR_INJURY_AND_0_BEING_THE_INABILITY_TO_PERFORM_ANY_OF_YOUR_USUAL_DAILY_ACTIVITIES?: 98

## 2022-03-14 NOTE — PROGRESS NOTES
Physical Therapy Initial Evaluation  March 14, 2022     Precautions/Restrictions/MD instructions: PT eval and treat.      Subjective:   Date of Onset: November 2021, MD order on 2/26/22.  C/C: right sided lateral knee pain  Quality of pain is dull and sharp. Pains are described as intermittent in nature. Pain is worse: after a workout . Pain is rated 0-4/10 normally, 6-7/10 if she really pushed it.   History of symptoms: Pains began gradually as the result of unknown origins. Since onset, symptoms are unsure. Previous episodes:none  Worsened by: standing figure four, squatting - mild popping  Alleviated by: Rest.    General health as reported by patient: good  Pertinent medical/surgical history: FFS, orthognathic jaw.    Imaging: none. 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 Patient's goals are: decrease pain, resume running without pain, improve tolerance to her post work put stretches, reduce popping at the knee with squats. Return to MD:  PRN.    Objective:  KNEE:    PROM:   L  R   Hyperextension 3 3   Extension 0 0   Flexion 150 150     Strength:   L R   HIP     Flex 5/5 4+/5   ER 5/5 4+/5   Abd 4/5 4-/5   KNEE     Flex     Ext       Hip PROM:  Min limit in R hip IR, WNL in all other planes       Special tests:   R   Anterior Drawer -   Posterior Drawer -   Lachman's -   Valgus 0 degrees -   Valgus 30 degrees -   Varus 0 degrees -   Varus 30 degrees -   Susannah's -   Appley's -   Lateral Compression -   Patellar Compression -       Palpation: tender over distal insertion of ITB consistent with chief complaint    Patellar tracking: WNL    Functional: moderate valgus with single leg squat    Gait: normal          Assessment/Plan:    The patient is a 26 year old adult with chief complaint of right sided lateral knee pain consistent with ITB syndrome.    The patient has the following significant findings with corresponding treatment plan.  Diagnosis 1:  Right lateral knee pain    Pain -  hot/cold  therapy, manual therapy, splint/taping/bracing/orthotics, self management, education, directional preference exercise and home program  Decreased ROM/flexibility - manual therapy and therapeutic exercise  Decreased strength - therapeutic exercise and therapeutic activities  Impaired balance - neuro re-education and therapeutic activities  Decreased proprioception - neuro re-education and therapeutic activities  Impaired gait - gait training  Impaired muscle performance - neuro re-education  Decreased function - therapeutic activities  Impaired posture - neuro re-education       Therapy Evaluation Codes:    Cumulative Therapy Evaluation is: Low complexity.    Previous and current functional limitations:  (See Goal Flow Sheet for this information)    Short term and Long term goals: (See Goal Flow Sheet for this information)     Communication ability:  Patient appears to be able to clearly communicate and understand verbal and written communication and follow directions correctly.  Treatment Explanation - The following has been discussed with the patient: RX ordered/plan of care, anticipated outcomes, and possible risks and side effects.  This patient would benefit from PT intervention to resume normal activities.   Rehab potential is good.    Frequency:  1 X week, once daily  Duration:  for 6 weeks  Discharge Plan: Achieve all LTGs, be independent in home treatment program, and reach maximal therapeutic benefit.    Please refer to the daily flowsheet for treatment today, total treatment time and time spent performing 1:1 timed codes.

## 2022-03-14 NOTE — PROGRESS NOTES
[facility]    OUTPATIENT Physical Therapy ORTHOPEDIC EVALUATION  PLAN OF TREATMENT FOR OUTPATIENT REHABILITATION  (COMPLETE FOR INITIAL CLAIMS ONLY)  Patient's Last Name, First Name, M.I.  YOB: 1995  Ilan Bazzi    Provider s Name:  [facility]   Medical Record No.  [id number]   Start of Care Date:  03/14/22   Onset Date:   02/16/22   Type:     _X__PT   ___OT Medical Diagnosis:    Encounter Diagnosis   Name Primary?     Acute pain of right knee         Treatment Diagnosis:  right lateral knee pain        Goals:     03/14/22 0500   Body Part   Goals listed below are for right knee   Goal #1   Goal #1 squatting/kneeling   Previous Functional Level No restrictions   Current Functional Level Can do a partial squat   Performance Level occasional popping at knee, valgus   STG Target Performance Do a partial squat   Performance Level no popping at lateral knee at valgus   Rationale for proper body mechanics while performing housework;for proper body mechanics while performing yardwork and home maintenance   Due date 04/11/22   LTG Target Performance Do a full squat   Performance Level no knee popping or valgus   Rationale for proper body mechanics while performing yardwork and home maintenance   Due date 05/09/22       Therapy Frequency:  1 x per week  Predicted Duration of Therapy Intervention:  6 weeks    Azul Urrutia PT                 I CERTIFY THE NEED FOR THESE SERVICES FURNISHED UNDER        THIS PLAN OF TREATMENT AND WHILE UNDER MY CARE     (Physician attestation of this document indicates review and certification of the therapy plan).                       Certification Date From:  03/14/22   Certification Date To:  06/11/22    Referring Provider:  Sofy Hudson    Initial Assessment        See Epic Evaluation SOC Date: 03/14/22

## 2022-03-21 ENCOUNTER — THERAPY VISIT (OUTPATIENT)
Dept: PHYSICAL THERAPY | Facility: CLINIC | Age: 27
End: 2022-03-21
Attending: STUDENT IN AN ORGANIZED HEALTH CARE EDUCATION/TRAINING PROGRAM
Payer: COMMERCIAL

## 2022-03-21 DIAGNOSIS — M25.561 ACUTE PAIN OF RIGHT KNEE: ICD-10-CM

## 2022-03-21 PROCEDURE — 97110 THERAPEUTIC EXERCISES: CPT | Mod: GP | Performed by: PHYSICAL THERAPIST

## 2022-03-21 PROCEDURE — 97112 NEUROMUSCULAR REEDUCATION: CPT | Mod: GP | Performed by: PHYSICAL THERAPIST

## 2022-03-29 DIAGNOSIS — F90.9 ATTENTION DEFICIT HYPERACTIVITY DISORDER (ADHD), UNSPECIFIED ADHD TYPE: ICD-10-CM

## 2022-03-29 NOTE — TELEPHONE ENCOUNTER
"Request for medication refill:  amphetamine-dextroamphetamine (ADDERALL) 5 MG tablet    Providers if patient needs an appointment and you are willing to give a one month supply please refill for one month and  send a letter/MyChart using \".SMILLIMITEDREFILL\" .smillimited and route chart to \"P [facility] \" (Giving one month refill in non controlled medications is strongly recommended before denial)    If refill has been denied, meaning absolutely no refills without visit, please complete the smart phrase \".smirxrefuse\" and route it to the \"P SMI MED REFILLS\"  pool to inform the patient and the pharmacy.    Brie Figueroa MA        "

## 2022-03-31 RX ORDER — DEXTROAMPHETAMINE SACCHARATE, AMPHETAMINE ASPARTATE, DEXTROAMPHETAMINE SULFATE AND AMPHETAMINE SULFATE 2.5; 2.5; 2.5; 2.5 MG/1; MG/1; MG/1; MG/1
10 TABLET ORAL DAILY
Qty: 30 TABLET | Refills: 0 | Status: SHIPPED | OUTPATIENT
Start: 2022-03-31 | End: 2022-05-11

## 2022-04-13 ENCOUNTER — THERAPY VISIT (OUTPATIENT)
Dept: PHYSICAL THERAPY | Facility: CLINIC | Age: 27
End: 2022-04-13
Payer: COMMERCIAL

## 2022-04-13 DIAGNOSIS — M25.561 ACUTE PAIN OF RIGHT KNEE: Primary | ICD-10-CM

## 2022-04-13 PROCEDURE — 97112 NEUROMUSCULAR REEDUCATION: CPT | Mod: GP | Performed by: PHYSICAL THERAPIST

## 2022-04-13 PROCEDURE — 97110 THERAPEUTIC EXERCISES: CPT | Mod: GP | Performed by: PHYSICAL THERAPIST

## 2022-04-13 ASSESSMENT — ACTIVITIES OF DAILY LIVING (ADL)
SIT WITH YOUR KNEE BENT: ACTIVITY IS NOT DIFFICULT
HOW_WOULD_YOU_RATE_THE_OVERALL_FUNCTION_OF_YOUR_KNEE_DURING_YOUR_USUAL_DAILY_ACTIVITIES?: NORMAL
RAW_SCORE: 70
RISE FROM A CHAIR: ACTIVITY IS NOT DIFFICULT
PAIN: I DO NOT HAVE THE SYMPTOM
WALK: ACTIVITY IS NOT DIFFICULT
STAND: ACTIVITY IS NOT DIFFICULT
STIFFNESS: I DO NOT HAVE THE SYMPTOM
KNEE_ACTIVITY_OF_DAILY_LIVING_SCORE: 100
AS_A_RESULT_OF_YOUR_KNEE_INJURY,_HOW_WOULD_YOU_RATE_YOUR_CURRENT_LEVEL_OF_DAILY_ACTIVITY?: NORMAL
KNEEL ON THE FRONT OF YOUR KNEE: ACTIVITY IS NOT DIFFICULT
GO UP STAIRS: ACTIVITY IS NOT DIFFICULT
WEAKNESS: I DO NOT HAVE THE SYMPTOM
SQUAT: ACTIVITY IS NOT DIFFICULT
LIMPING: I DO NOT HAVE THE SYMPTOM
HOW_WOULD_YOU_RATE_THE_CURRENT_FUNCTION_OF_YOUR_KNEE_DURING_YOUR_USUAL_DAILY_ACTIVITIES_ON_A_SCALE_FROM_0_TO_100_WITH_100_BEING_YOUR_LEVEL_OF_KNEE_FUNCTION_PRIOR_TO_YOUR_INJURY_AND_0_BEING_THE_INABILITY_TO_PERFORM_ANY_OF_YOUR_USUAL_DAILY_ACTIVITIES?: 100
GO DOWN STAIRS: ACTIVITY IS NOT DIFFICULT
SWELLING: I DO NOT HAVE THE SYMPTOM
GIVING WAY, BUCKLING OR SHIFTING OF KNEE: I DO NOT HAVE THE SYMPTOM
KNEE_ACTIVITY_OF_DAILY_LIVING_SUM: 70

## 2022-04-19 RX ORDER — DESVENLAFAXINE 50 MG/1
50 TABLET, FILM COATED, EXTENDED RELEASE ORAL DAILY
Status: CANCELLED | OUTPATIENT
Start: 2022-04-19

## 2022-04-20 ENCOUNTER — OFFICE VISIT (OUTPATIENT)
Dept: FAMILY MEDICINE | Facility: CLINIC | Age: 27
End: 2022-04-20
Payer: COMMERCIAL

## 2022-04-20 VITALS
BODY MASS INDEX: 24.27 KG/M2 | HEART RATE: 80 BPM | TEMPERATURE: 97.4 F | DIASTOLIC BLOOD PRESSURE: 70 MMHG | HEIGHT: 71 IN | OXYGEN SATURATION: 97 % | SYSTOLIC BLOOD PRESSURE: 105 MMHG | WEIGHT: 173.4 LBS

## 2022-04-20 DIAGNOSIS — F32.A DEPRESSION, UNSPECIFIED DEPRESSION TYPE: Primary | ICD-10-CM

## 2022-04-20 DIAGNOSIS — R68.82 LOW LIBIDO: ICD-10-CM

## 2022-04-20 DIAGNOSIS — F90.9 ATTENTION DEFICIT HYPERACTIVITY DISORDER (ADHD), UNSPECIFIED ADHD TYPE: ICD-10-CM

## 2022-04-20 DIAGNOSIS — F64.0 GENDER DYSPHORIA IN ADOLESCENT AND ADULT: ICD-10-CM

## 2022-04-20 PROCEDURE — 99214 OFFICE O/P EST MOD 30 MIN: CPT | Performed by: STUDENT IN AN ORGANIZED HEALTH CARE EDUCATION/TRAINING PROGRAM

## 2022-04-20 RX ORDER — VENLAFAXINE HYDROCHLORIDE 37.5 MG/1
CAPSULE, EXTENDED RELEASE ORAL
Qty: 60 CAPSULE | Refills: 2 | Status: SHIPPED | OUTPATIENT
Start: 2022-04-20 | End: 2022-06-23

## 2022-04-20 RX ORDER — MIRTAZAPINE 15 MG/1
TABLET, FILM COATED ORAL
Qty: 21 TABLET | Refills: 0 | Status: SHIPPED | OUTPATIENT
Start: 2022-04-20 | End: 2022-06-23

## 2022-04-20 ASSESSMENT — ANXIETY QUESTIONNAIRES
6. BECOMING EASILY ANNOYED OR IRRITABLE: NOT AT ALL
IF YOU CHECKED OFF ANY PROBLEMS ON THIS QUESTIONNAIRE, HOW DIFFICULT HAVE THESE PROBLEMS MADE IT FOR YOU TO DO YOUR WORK, TAKE CARE OF THINGS AT HOME, OR GET ALONG WITH OTHER PEOPLE: NOT DIFFICULT AT ALL
7. FEELING AFRAID AS IF SOMETHING AWFUL MIGHT HAPPEN: NOT AT ALL
1. FEELING NERVOUS, ANXIOUS, OR ON EDGE: SEVERAL DAYS
5. BEING SO RESTLESS THAT IT IS HARD TO SIT STILL: NOT AT ALL
GAD7 TOTAL SCORE: 3
3. WORRYING TOO MUCH ABOUT DIFFERENT THINGS: SEVERAL DAYS
2. NOT BEING ABLE TO STOP OR CONTROL WORRYING: NOT AT ALL

## 2022-04-20 ASSESSMENT — PATIENT HEALTH QUESTIONNAIRE - PHQ9
SUM OF ALL RESPONSES TO PHQ QUESTIONS 1-9: 9
5. POOR APPETITE OR OVEREATING: SEVERAL DAYS

## 2022-04-20 NOTE — PATIENT INSTRUCTIONS
Patient Education   Here is the plan from today's visit    Med Cross taper:   Week 1 (start today): mirtazepine 30mg nightly   Week 2: mirtazepine 15mg nightly, venlafaxine 37.5mg daily   Week 3 and on: venlafaxine 75mg daily     For sexual function:   - referral for sex therapy  - decreasing estradiol and a switch from finasteride to fadumo are both options. Some changes are definitely related to the high estrogen/lower tesotsterone state, and my goal is to keep navigating what your goals and desires are and what feels like the best/most affirming balance for you. Switching from finasteride to spironolactone is an option. I also failed to mention that if erectile issues specifically are something to target, we can certainly trial viagra or something similar to see if that change could be affirming/satisfying for you.     1. Depression, unspecified depression type  - venlafaxine (EFFEXOR-XR) 37.5 MG 24 hr capsule; Take 1 capsule (37.5mg) by mouth once daily x1 week, then increase to 2 capsules (75mg) once daily  Dispense: 60 capsule; Refill: 2  - mirtazapine (REMERON) 15 MG tablet; Take 2 tabs (30mg) once daily x1 week, then decrease to 1 tab (15mg) once daily x1 week, then discontinue.  Dispense: 21 tablet; Refill: 0    2. Attention deficit hyperactivity disorder (ADHD), unspecified ADHD type    3. Gender dysphoria in adolescent and adult    4. Orgasm dysfunction  - [facility]  Referral; Future    5. Low libido  - [facility]  Referral; Future      Please call or return to clinic if your symptoms don't go away.    Follow up plan  Return in about 6 weeks (around 6/1/2022).    Thank you for coming to [facility] today.  Lab Testing:  **If you had lab testing today and your results are reassuring or normal they will be mailed to you or sent through [employer] within 7 days.   **If the lab tests need quick action we will call you with the results.  **If you are having labs  done on a different day, please call [phone] to schedule at [facility] or [phone] for other [facility] locations. Labs do not offer walk-in appointments.  The phone number we will call with results is # [phone] (home) [phone] (work). If this is not the best number please call our clinic and change the number.  Medication Refills:  If you need any refills please call your pharmacy and they will contact us.   If you need to  your refill at a new pharmacy, please contact the new pharmacy directly. The new pharmacy will help you get your medications transferred faster.   Scheduling:  If you have any concerns about today's visit or wish to schedule another appointment please call our office during normal business hours [phone] (8-5:00 M-F)  If a referral was made to an [facility] specialty provider and you do not get a call from central scheduling, please refer to directions on your visit summary or call our office during normal business hours for assistance.   If a Mammogram was ordered for you at the [facility] call [phone] to schedule or change your appointment.  If you had an XRay/CT/Ultrasound/MRI ordered the number is [phone] to schedule or change your radiology appointment.   [facility] has limited ultrasound appointments available on Wednesdays, if you would like your ultrasound at [facility], please call [phone] to schedule.   Medical Concerns:  If you have urgent medical concerns please call [phone] at any time of the day.    Soyf Hudson, DO

## 2022-04-20 NOTE — PROGRESS NOTES
Assessment & Plan     Depression, unspecified depression type  Reviewed [employer] and PharmD insights w Ilan. Multiple pharmacodynamic and pharmacokinetic effects. SNRIs appear to be best choice. Will cross taper to venlafaxine - see AVS. Follow up 4 or so weeks after on 75mg daily venlafaxine.   - venlafaxine (EFFEXOR-XR) 37.5 MG 24 hr capsule  Dispense: 60 capsule; Refill: 2  - mirtazapine (REMERON) 15 MG tablet  Dispense: 21 tablet; Refill: 0    Attention deficit hyperactivity disorder (ADHD), unspecified ADHD type  Continue Adderall for now - reevaluate once stable on venlafaxine     Gender dysphoria in adolescent and adult  Orgasm dysfunction  Low libido  On feminizing HRT with injectable estrogen and finasteride. See previous discussions/messages. Discussed insights and expectations of HRT maury around sexual function, orgasm, ejaculate, and penile appearance on feminizing HRT. Reviewed that a decrease in estradiol could be helpful. could also consider a switch from finasteride to fadumo, may be helpful but can't predict - concern is further hair loss. For now pt interested in sex therapy - referral placed. Further med adjustments as indicated per shared decision making.   - [facility]  Referral      Diagnosis or treatment significantly limited by social determinants of health - gender identity  Prescription drug management  24 minutes spent on the date of the encounter doing chart review, history and exam, documentation and further activities per the note    Return in about 6 weeks (around 6/1/2022).    Sofy Hudson DO  [facility] TERRANCE    Kameron Talavera is a 26 year old who presents for the following health issues      HPI       PharmD info on [employer]:  The results for Ilan's PGx testing is back.  Normally I try to touch base with the provider first to explain the findings. I will be out of othe office for a week so will do this by msg.  Then, I am happy to  "call or meet with the pt to review findings as well.   There are 3 main findings:   1) decreased response to SSRIs (mainly citalopram, escitalopram and paraxetine) but may involve others.   2) increase adverse effects associated mainly with citalopram and escitalopram     3) High metabolizer of Cy - mirtazapine   Assessment:   Try to avoid the SSRIs that I list above. The others could be used with some caution.  I may recommend considering using effexor as an option early in the process as the SNRIs are not at involved in some of these interactions and sensitivities.           PHQ 9/3/2021 2022 2022   PHQ-9 Total Score 10 15 9   Q9: Thoughts of better off dead/self-harm past 2 weeks Not at all Not at all Not at all         Review of Systems   Pertinent positives and negatives per HPI.      Objective    /70   Pulse 80   Temp 97.4  F (36.3  C) (Oral)   Ht 1.803 m (5' 11\")   Wt 78.7 kg (173 lb 6.4 oz)   SpO2 97%   BMI 24.18 kg/m    Body mass index is 24.18 kg/m .  Physical Exam   GENERAL: alert, cooperative, in no acute distress  HEENT: sclera clear, moist mucous membranes   PULM: normal respiratory effort   CV: regular rate, extremities warm and well perfused   NEURO: alert and oriented, grossly intact, moves all extremities, normal gait   SKIN: no rashes or lesions visualized   PSYCH: euthymic affect, mildly restricted       "

## 2022-04-21 ASSESSMENT — ANXIETY QUESTIONNAIRES: GAD7 TOTAL SCORE: 3

## 2022-05-16 ENCOUNTER — VIRTUAL VISIT (OUTPATIENT)
Dept: PSYCHOLOGY | Facility: CLINIC | Age: 27
End: 2022-05-16
Payer: COMMERCIAL

## 2022-05-16 DIAGNOSIS — F33.41 RECURRENT MAJOR DEPRESSIVE DISORDER, IN PARTIAL REMISSION (H): ICD-10-CM

## 2022-05-16 DIAGNOSIS — N52.2 DRUG-INDUCED ERECTILE DYSFUNCTION: Primary | ICD-10-CM

## 2022-05-16 DIAGNOSIS — F41.1 GENERALIZED ANXIETY DISORDER: ICD-10-CM

## 2022-05-16 DIAGNOSIS — F64.0 GENDER DYSPHORIA IN ADOLESCENT AND ADULT: ICD-10-CM

## 2022-05-16 PROCEDURE — 99207 PR NO BILLABLE SERVICE THIS VISIT: CPT | Performed by: MARRIAGE & FAMILY THERAPIST

## 2022-05-16 PROCEDURE — 90791 PSYCH DIAGNOSTIC EVALUATION: CPT | Mod: 95 | Performed by: MARRIAGE & FAMILY THERAPIST

## 2022-05-16 NOTE — PROGRESS NOTES
"[facility]  Provider Name:  Linda Coppola [facility], [facility]         PATIENT'S NAME: Ilan Bazzi  PREFERRED NAME: Ilan  PRONOUNS: she/her  MRN: [id number]  : 1995 , Age: 26 year old  DATE OF SERVICE: 22  START TIME: 1057  END TIME: 1159  PREFERRED PHONE: [phone] (home) [phone] (work)   May we leave a program related message: Yes  SERVICE MODALITY:  Video Visit:      Provider verified identity through the following two step process.  Patient provided:  Patient     Telemedicine Visit: The patient's condition can be safely assessed and treated via synchronous audio and visual telemedicine encounter.      Reason for Telemedicine Visit: Services only offered telehealth    Originating Site (Patient Location): Patient's home    Distant Site (Provider Location): Provider Remote Setting- Home Office    Consent:  The patient/guardian has verbally consented to: the potential risks and benefits of telemedicine (video visit) versus in person care; bill my insurance or make self-payment for services provided; and responsibility for payment of non-covered services.     Patient would like the video invitation sent by:  My Chart    Mode of Communication:  Video Conference via DJZ    As the provider I attest to compliance with applicable laws and regulations related to telemedicine.    UNIVERSAL ADULT Mental Health DIAGNOSTIC ASSESSMENT    Reviewed confidentiality, informed consent, and relevant [facility] policies.    Identifying Information:  Patient is a 26 year old year old,   .  The pronoun use throughout this assessment reflects the patient's chosen pronoun.  Patient was referred for an assessment by primary care provider .  Patient attended the session alone.    Chief Complaint:   The reason for seeking services at this time is: \" sexual desire and relationship with sex \"   The problem(s) began 9 or 10 months ago. Patient has not attempted to resolve these concerns " "in the past.    Social/Family History:  Patient reported they grew up in [place].  They were raised by biological parents.  Parents stayed ..   Patient reported that their childhood was sheltered, repressed, and priviledged.  Patient described their current relationships with family of origin as \" we dont talk\" talk to some aunts and uncles, havent spoken to mom in 3 years, sister same, brother in 2 years, older sister 1.5 years, very occasionally I will text with dad, but havent actually talked to dad since 2020.      The patient describes their cultural background as american exceptional ism, grew up in all white community, moved to [place] when was 12, been here ever since, went from [employer] to public school in [place], a little more diversity than [place], up until 21 I had no idea that I was queer, that I wasn't \"default setting\" I havent had the language or experience to know any different.  For the past 5 years aldo learned a lot about queer spaces, class consciousness, still have lots of cultural blind spots, havent had lots of experiences, Cultural influences and impact on patient's life structure, values, norms, and healthcare: see above.  Contextual influences on patient's health include: Individual Factors past history in [employer].    These factors will be addressed in the Preliminary Treatment plan.  Patient identified their preferred language to be English. Patient reported they do  need the assistance of an  or other support involved in therapy.     Patient reported had no significant delays in developmental tasks.   Patient's highest education level was college graduate. Patient identified the following learning problems: attention, concentration and moderate adhd diagnosis.  Modifications will not be used to assist communication in therapy. .    Patient reports they are  able to understand written materials.    Patient reported the following relationship " history in a relationship with Valentin, started dating in September 2016-partners.  Patient's current relationship status is partnered / significant other for since 2016.   Patient identified their sexual orientation as homosexual lesbian  Patient reported having zero child(cheyenne). Patient identified partner, pets, friends and roommates/friends as part of their support system.  Patient identified the quality of these relationships as stable and meaningful.     Patient's current living/housing situation involves staying in own home/apartment.  They live with 4 roommates and they report that housing is stable.     Patient is currently employed part time and reports they are able to function appropriately at work. is looking at getting full time internship, struggles with 40 hour a week job due to executive dysfunction.  Patient reports their finances are obtained through employment.  Patient does identify finances as a current stressor.      Patient reported that they have not been involved with the legal system.   Patient denies being on probation / parole / under the jurisdiction of the court.      Current Significant Relationship History: has been partnered with Valentin since September 2016-they are poly together, no other physical partners, is flirtatious with others    Relationship and Sexual Therapy (REST) Program-Specific Information   1. Sexual behaviors/Functioning    Sexual Frequency: have not had sex with valentin in maybe a year-masturbating 3-4 days a week sometimes more    Sexual Desire/Interest/Arousal:  Patient reports concerns related to sexual interest. This includes partnered activities, fantasies, masturbation. With the transition hormones-the functioning of sex organs/penis have changed, is working on finding balance with estrogen and testosterone, wants to being able to have libido and erections-    Erections:   Patient reports difficulties getting/keeping erections sometimes but not all of the time,  "sometimes there is soreness in penis, usually around masturbation since pt has not had sex with partner in about a year        Orgasm:   Patient reports they reach orgasm in partnered activities (has been engaging in role play), and 100% of time in solo activities. She reports that sometimes it seems like she skips the orgasm but had some medication to address this, seems to be getting better     Genital Pain:   Patient reports pain and discomfort with erection at times    Sexual aversion/avoidance:  Patient reports feeling averse to NA.    Patient denies gender identity concerns. Is confident in gender identity as a trans woman     2. Relationship History     First sexual experience with partner: 2 sexual partners before valentin, had \"awkward oral sex\" second person dated had sex-was 17 or 18 when had first sexual experience with partner senior year of high school    Unpleasant or traumatic sexual experiences: reports had experiences with partner where the partner would tell her she was doing things wrong-reports she would have anxiety about doing or saying the wrong things, pt reports she didn't have healthy sexual communication, reports she was coercive or pressured partner for sex reports feeling \"really scummy about it still\" had a partner    Same sex experiences and fantasies: \"yes I am a lesbian \" have had bisexual fantasy at times, but has never really been aroused by a man    3. Compulsive Sexuality:  Patient denies any compulsive sexual concerns.  Patient's Strengths and Limitations:  Patient identified the following strengths or resources that will help them succeed in treatment: family support. Things that may interfere with the patient's success in treatment include: none identified.     Personal and Family Medical History:  Patient does report a family history of mental health concerns.  Patient reports family history includes Alzheimer Disease in her maternal grandmother; Coronary Artery Disease in her " maternal grandfather; Depression in her mother; Macular Degeneration in her father; Myocardial Infarction in her maternal grandfather and paternal grandmother; Other - See Comments (age of onset: 66) in her father..     Patient does report Mental Health Diagnosis and/or Treatment.  Patient Patient reported the following previous diagnoses which include(s): an Anxiety Disorder and Depression.  Patient reported symptoms began in childhood.   Patient has received mental health services in the past: therapy with grace jones at [facility] has been seeing ly 2020.  Psychiatric Hospitalizations: [facility] when 18 for suicide watch for one night .  Patient denies a history of civil commitment.  Patient is receiving other mental health services.  These include psychotherapy with grace jones at [facility]  and none.         Patient has had a physical exam to rule out medical causes for current symptoms.  Date of last physical exam was within the past year. Client was encouraged to follow up with PCP if symptoms were to develop. The patient has a [facility] Primary Care Provider, who is named Sofy Hudson..  Patient reports no current medical concerns and no current dental concerns.  Patient denies any issues with pain..   There are not significant appetite / nutritional concerns / weight changes.   Patient does report a history of head injury / trauma / cognitive impairment.  Concussion when 6      Patient reports current meds as:   No outpatient medications have been marked as taking for the 5/16/22 encounter (Virtual Visit) with Eros Coppola LMFT, [facility].       Medication Adherence:  Patient reports taking prescribed medications as prescribed.    Patient Allergies:    Allergies   Allergen Reactions     Wellbutrin [Bupropion] Swelling     Pruritus and edema to feet       Medical History:    Past Medical History:   Diagnosis Date     Anxiety      Concussion without loss of consciousness 2001     Left varicocele  7/20/2021         Current Mental Status Exam:   Appearance:  Appropriate    Eye Contact:  Good   Psychomotor:  Normal   Attitude / Demeanor: Cooperative   Speech      Rate / Production: Normal/ Responsive      Volume:  Normal  volume      Language:  intact  Mood:   Anxious  Depressed   Affect:   Appropriate    Thought Content: Clear   Thought Process: Coherent  Logical       Associations: No loosening of associations  Insight:   Good   Judgment:  Intact   Orientation:  All  Attention/concentration: Good      Substance Use:  Patient reports history of substance use.  Patient denies current substance use.  Patient denies belief that their current substance use is problematic.    Significant Losses / Trauma / Abuse / Neglect Issues:   Patient   Did not  serve in the .  There are indications or report of significant loss, trauma, abuse or neglect issues related to: Uatsdin trauma, abandonment trauma.  Concerns for possible neglect are not present.     Safety Assessment:   Current Safety Concerns:  Jenkins Suicide Severity Rating Scale (Lifetime/Recent)No flowsheet data found.  Patient denies current homicidal ideation and behaviors.  Patient denies current self-injurious ideation and behaviors.    Patient denied risk behaviors associated with substance use.  Patient denies any high risk behaviors associated with mental health symptoms.  Patient reports the following current concerns for their personal safety: None.  Patient reports there are not  firearms in the house.       There are no firearms in the home..    History of Safety Concerns:  Patient denied a history of homicidal ideation.     Patient reported a history of personal safety concerns: yes at 18-suicidal ideation  Patient denied a history of assaultive behaviors.    Patient denied a history of sexual assault behaviors.     Patient denied a history of risk behaviors associated with substance use.  Patient denies any history of high risk behaviors  associated with mental health symptoms.  Patient reports the following protective factors:      Risk Plan:  See Recommendations for Safety and Risk Management Plan    Review of Symptoms per patient report:  Depression: Change in sleep, Lack of interest, Change in energy level, Difficulties concentrating, Psychomotor slowing or agitation, Feelings of hopelessness, Feelings of helplessness, Low self-worth, Ruminations, Irritability, Feeling sad, down, or depressed, Withdrawn and Poor hygeine  Danii:  No Symptoms  Psychosis: No Symptoms  Anxiety: Excessive worry, Nervousness, Physical complaints, such as headaches, stomachaches, muscle tension, Separation anxiety, Social anxiety, Sleep disturbance, Ruminations and Poor concentration  Panic:  No symptoms  Post Traumatic Stress Disorder:  Hypervigilance and Increased arousal   Eating Disorder: No Symptoms  ADD / ADHD:  Inattentive, Poor task completion, Poor organizational skills, Distractibility, Forgetful, Impulsive and Restlessness/fidgety  Conduct Disorder: No symptoms  Autism Spectrum Disorder: No symptoms  Obsessive Compulsive Disorder: No Symptoms    Patient reports the following compulsive behaviors and treatment history: none.      Diagnostic Criteria:   Generalized Anxiety Disorder  A. Excessive anxiety and worry about a number of events or activities (such as work or school performance).   B. The person finds it difficult to control the worry.  C. Select 3 or more symptoms (required for diagnosis). Only one item is required in children.   - Restlessness or feeling keyed up or on edge.    - Being easily fatigued.    - Difficulty concentrating or mind going blank.    - Muscle tension.    - Sleep disturbance (difficulty falling or staying asleep, or restless unsatisfying sleep).   D. The focus of the anxiety and worry is not confined to features of an Axis I disorder.  E. The anxiety, worry, or physical symptoms cause clinically significant distress or impairment  in social, occupational, or other important areas of functioning.   F. The disturbance is not due to the direct physiological effects of a substance (e.g., a drug of abuse, a medication) or a general medical condition (e.g., hyperthyroidism) and does not occur exclusively during a Mood Disorder, a Psychotic Disorder, or a Pervasive Developmental Disorder. Major Depressive Disorder  CRITERIA (A-C) REPRESENT A MAJOR DEPRESSIVE EPISODE - SELECT THESE CRITERIA  A) Recurrent episode(s) - symptoms have been present during the same 2-week period and represent a change from previous functioning 5 or more symptoms (required for diagnosis)   - Depressed mood. Note: In children and adolescents, can be irritable mood.     - Diminished interest or pleasure in all, or almost all, activities.    - Psychomotor activity agitation.    - Fatigue or loss of energy.    - Feelings of worthlessness or NA guilt.    - Diminished ability to think or concentrate, or indecisiveness.   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

## 2022-05-21 ENCOUNTER — HEALTH MAINTENANCE LETTER (OUTPATIENT)
Age: 27
End: 2022-05-21

## 2022-06-01 ENCOUNTER — VIRTUAL VISIT (OUTPATIENT)
Dept: PSYCHOLOGY | Facility: CLINIC | Age: 27
End: 2022-06-01
Payer: COMMERCIAL

## 2022-06-01 DIAGNOSIS — F41.1 GENERALIZED ANXIETY DISORDER: ICD-10-CM

## 2022-06-01 DIAGNOSIS — F33.41 RECURRENT MAJOR DEPRESSIVE DISORDER, IN PARTIAL REMISSION (H): ICD-10-CM

## 2022-06-01 DIAGNOSIS — F64.0 GENDER DYSPHORIA IN ADOLESCENT AND ADULT: Primary | ICD-10-CM

## 2022-06-01 DIAGNOSIS — N52.2 DRUG-INDUCED ERECTILE DYSFUNCTION: ICD-10-CM

## 2022-06-01 PROCEDURE — 99207 PR NO BILLABLE SERVICE THIS VISIT: CPT | Performed by: MARRIAGE & FAMILY THERAPIST

## 2022-06-01 PROCEDURE — 90837 PSYTX W PT 60 MINUTES: CPT | Mod: 95 | Performed by: MARRIAGE & FAMILY THERAPIST

## 2022-06-01 ASSESSMENT — PATIENT HEALTH QUESTIONNAIRE - PHQ9
SUM OF ALL RESPONSES TO PHQ QUESTIONS 1-9: 8
SUM OF ALL RESPONSES TO PHQ QUESTIONS 1-9: 8
10. IF YOU CHECKED OFF ANY PROBLEMS, HOW DIFFICULT HAVE THESE PROBLEMS MADE IT FOR YOU TO DO YOUR WORK, TAKE CARE OF THINGS AT HOME, OR GET ALONG WITH OTHER PEOPLE: SOMEWHAT DIFFICULT

## 2022-06-01 NOTE — PROGRESS NOTES
[facility]  [address] [place] [address], [address]  [place] [address]  Phone: [phone]  Fax: [phone]  [employer]    [facility]:   Acknowledgement of Current Treatment Plan       I have reviewed my treatment plan with my therapist on 6/1/2022.   I agree with the plan as it is written in the electronic health record.    Name:      Signature:  Ilan Bazzi       Unable to sign due to covid 19 protocols   PARISH Cooper  Psychotherapist   PARISH Quispe, [facility] on 6/1/2022 at 2:19 PM           Regulatory Guidelines for Updating Treatment Plan  [place] Medical Assistance: Reviewed & signed at least every 90days  Medicare:  Update per policy

## 2022-06-01 NOTE — PROGRESS NOTES
[facility] - Progress Note    Date of Service: 22   Name: Ilan Bazzi  : 1995  Medical Record Number: [id number]  Treating Provider: PARISH Casiano LADC  Type of Session: Individual  Present in Session: pt  Session Start and Stop Time: 0146-9017  Number of Minutes:  55    SERVICE MODALITY:  Video Visit:      Provider verified identity through the following two step process.  Patient provided:  Patient  and Patient address    Telemedicine Visit: The patient's condition can be safely assessed and treated via synchronous audio and visual telemedicine encounter.      Reason for Telemedicine Visit: Services only offered telehealth    Originating Site (Patient Location): Patient's home    Distant Site (Provider Location): Provider Remote Setting- Home Office    Consent:  The patient/guardian has verbally consented to: the potential risks and benefits of telemedicine (video visit) versus in person care; bill my insurance or make self-payment for services provided; and responsibility for payment of non-covered services.     Patient would like the video invitation sent by:  My Chart    Mode of Communication:  Video Conference via [facility]    As the provider I attest to compliance with applicable laws and regulations related to telemedicine.    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  302.85 (F64.1) Gender dysphoria in Adolescents and Adults  Posttransition  ADHD by history   Provisional Diagnostic Hypothesis (Explain R/O, other Provisional Diagnosis, and why alternative Diagnosis that were considered were ruled out): NA      Current Reported Symptoms and Status update:  Pt wants to increase boundaries around sexual expression   Pt wants to integrate sexual identity  "and expression with partners     Progress Toward Treatment Goals:   Created itp     Therapeutic Interventions/Treatment Strategies:    Area(s) of treatment focus addressed in this session included Symptom Management and Relationship     Pt engaged in treatment planning -explored how she had event last week     Psychotherapist offered support, feedback and validation and reinforced use of skills. Treatment modalities used include Motivational Interviewing, Cognitive Behavioral Therapy and Dialectical Behavioral Therapy. Interventions include Relationship Skills: Assisted patients in implementing more effective communication skills in their relationships, Encouraged development and maintenance  of healthy boundaries and Discussed strategies to promote healthier understanding of interpersonal relationships.  Support, Feedback, Structured Activity, Problem Solving, Clarification and Education    Patient Response:   Patient responded to session by accepting feedback, listening, focusing on goals, being attentive, accepting support, appearing alert and verbalizing understanding  Possible barriers to participation / learning include: and no barriers identified    Current Mental Status Exam:   Appearance:  Appropriate   Eye Contact:  Good   Attitude / Demeanor: Cooperative   Speech      Rate / Production: Normal/ Responsive      Volume:  Normal  volume  Orientation:  All  Mood:   Anxious   Affect:   Appropriate   Thought Content: Clear   Insight:   Good       Plan/Need for Future Services:  Return for therapy in 3 weeks to treat diagnosed problems.    Patient has a current master individualized treatment plan and today was our weekly review of the patient's progress.  See Epic treatment plan for progress / updates on goals and plan.    Assignment:  Start thinking about and creating a go green check list-there may need to be different check lists for different types of play -please be detailed -think of it as a \"pre flight " "checklist\"    Interactive Complexity:  There are four specific communication difficulties that complicate the work of the primary psychiatric procedure.  Interactive complexity (+23024) may be reported when at least one of these difficulties is present.    Communication difficulties present during current the psychiatric procedure include:  1. None.      Signature/Title:    Eros Coppola, LMFT, 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    SERVICE MODALITY:  Video Visit:      Provider verified identity through the following two step process.  Patient provided:  Patient     Telemedicine Visit: The patient's condition can be safely assessed and treated via synchronous audio and visual telemedicine encounter.      Reason for Telemedicine Visit: Services only offered telehealth    Originating Site (Patient Location): Patient's home    Distant Site " "(Provider Location): Provider Remote Setting- Home Office    Consent:  The patient/guardian has verbally consented to: the potential risks and benefits of telemedicine (video visit) versus in person care; bill my insurance or make self-payment for services provided; and responsibility for payment of non-covered services.     Patient would like the video invitation sent by:  My Chart    Mode of Communication:  Video Conference via [facility]    As the provider I attest to compliance with applicable laws and regulations related to telemedicine.    Start Date: 6/1/2022  Anticipated duration/number of sessions: 26 (pending authorization/clinical changes)    Impact of Symptoms on Function:  Decreased Physical/Health Status  Decreased Social/Family Function    Sexual Problems:  Low Desire  Arousal Problems  Erectile Problems    Gender Concerns:  Trans female post transition    Client Strengths:  caring, empathetic, good listener, has a previous history of therapy, intelligent, open to learning, open to suggestions / feedback, support of family, friends and providers and supportive    Client Participation in Plan:  Contributed to goals and plan   Attended individual treatment plan meeting on 6/1/2022  Agrees with plan   Received copy of treatment plan   Discussed with staff     Areas of Vulnerability:  Anxiety  Depressive symptoms   Trauma/Abuse/Neglect    Long-Term Goals:  Knowledge about illness and management of symptoms     Discharge Criteria:  Satisfactory progress toward treatment goals   Improvement re: identified problems and symptoms   Has a discharge plan in place   Regular attendance as scheduled     Areas of Treatment Focus     Why are you seeking treatment/What do you want to focus on during treatment? \"I want to figure out my relationship to sex and specifically how it pertains to my sexuality and the way I view sex whether with intimate partner or friend and how to minimize the risk of me getting carried away in " "sexual situations and then hurting someone I care about \"       Area of Treatment Focus:   Symptom Stabilization and Management  Start Date:    6/1/2022    Goal: Target Date: 9/1/2022 Status: Active   -pt will process and explore, thoughts and feelings about sex and relationships, sexual identity, as it pertains to integration of sexual self  -Pt will learn and practice ways of setting and exploring boundaries and values for herself and partners, how to express them and live authentically-utilizing skills they may learn in therapy      Progress:           Treatment Strategies:   Assess / reassess for appropriate therapy program involvement, encourage participation in therapies  Provide education regarding human sexuality   Teach adaptive coping skills and communication skills  Use reality based supportive approach   Intervention(s):  Therapist will assign homework as needed   Therapist will make referrals to as needed     Area of Treatment Focus:   Community Resources / Support and Discharge Planning  Start Date:    6/1/2022    Goal: Target Date: 9/1/2022 Status: Active  Will develop an aftercare / transition plan by by time of discharge      Progress:           Treatment Strategies:   Assist clients in establishing / strengthening support network  Assist with discharge planning  Assess / reassess for appropriate therapy program involvement, encourage participation in therapies   Intervention(s):  Therapist will make referrals to as needed     See treatment plan signature page for patient and provider signature     The Individualized Treatment Plan Signature Page has been routed to the provider for co-sign (as required).    PARISH Quispe, [facility]        "

## 2022-06-02 ENCOUNTER — MYC MEDICAL ADVICE (OUTPATIENT)
Dept: FAMILY MEDICINE | Facility: CLINIC | Age: 27
End: 2022-06-02
Payer: COMMERCIAL

## 2022-06-02 DIAGNOSIS — F90.9 ATTENTION DEFICIT HYPERACTIVITY DISORDER (ADHD), UNSPECIFIED ADHD TYPE: ICD-10-CM

## 2022-06-06 PROBLEM — M25.561 ACUTE PAIN OF RIGHT KNEE: Status: RESOLVED | Noted: 2022-03-14 | Resolved: 2022-06-06

## 2022-06-06 NOTE — PROGRESS NOTES
Discharge Note    Progress reporting period is from initial evaluation date (please see noted date below) to Apr 13, 2022.  No linked episodes      Ilan failed to follow up and current status is unknown.  Please see information below for last relevant information on current status.  Patient seen for 3 visits.    SUBJECTIVE  Subjective changes noted by patient:  Reports she has been practicing with her HEP. Reports R knee is not popping as often. ITB tightness is much improved. She reports a 90% improvement in symptoms overall. Reports she tweaked her neck when sleeping a few days ago. She could not turn to the right very well at first and  .  Current pain level is  .     Previous pain level was  1/10.   Changes in function:  Yes (See Goal flowsheet attached for changes in current functional level)  Adverse reaction to treatment or activity: None    OBJECTIVE  Changes noted in objective findings: Min valgus occasionally with SL squat but improves with cues.  Screen c-spine: min limit in R rotation and flexion. Resolves with cervical retraction     ASSESSMENT/PLAN  Diagnosis: right lateral knee pain   Updated problem list and treatment plan:   Pain - HEP  Decreased ROM/flexibility - HEP  Decreased function - HEP  STG/LTGs have been met or progress has been made towards goals:  Yes, please see goal flowsheet for most current information  Assessment of Progress: current status is unknown.    Last current status:     Self Management Plans:  HEP  I have re-evaluated this patient and find that the nature, scope, duration and intensity of the therapy is appropriate for the medical condition of the patient.  Ilan continues to require the following intervention to meet STG and LTG's:  HEP.    Recommendations:  Discharge with current home program.  Patient to follow up with MD as needed.    Please refer to the daily flowsheet for treatment today, total treatment time and time spent performing 1:1 timed codes.

## 2022-06-07 RX ORDER — DEXTROAMPHETAMINE SACCHARATE, AMPHETAMINE ASPARTATE, DEXTROAMPHETAMINE SULFATE AND AMPHETAMINE SULFATE 2.5; 2.5; 2.5; 2.5 MG/1; MG/1; MG/1; MG/1
TABLET ORAL
Qty: 90 TABLET | Refills: 0 | Status: SHIPPED | OUTPATIENT
Start: 2022-06-07 | End: 2022-06-23

## 2022-06-07 NOTE — TELEPHONE ENCOUNTER
Okay with Adderall dose change via MyChart - following up with me later this month. 20mg IR in AM, 10mg in PM.

## 2022-06-09 DIAGNOSIS — F64.0 GENDER DYSPHORIA IN ADOLESCENT AND ADULT: ICD-10-CM

## 2022-06-09 NOTE — TELEPHONE ENCOUNTER
"Request for medication refill:  finasteride (PROPECIA) 1 MG tablet  Providers if patient needs an appointment and you are willing to give a one month supply please refill for one month and  send a letter/MyChart using \".SMILLIMITEDREFILL\" .smillimited and route chart to \"[facility] \" (Giving one month refill in non controlled medications is strongly recommended before denial)    If refill has been denied, meaning absolutely no refills without visit, please complete the smart phrase \".smirxrefuse\" and route it to the \"[facility] MED REFILLS\"  pool to inform the patient and the pharmacy.    Jaja Torres        "

## 2022-06-10 RX ORDER — FINASTERIDE 1 MG/1
1 TABLET, FILM COATED ORAL EVERY EVENING
Qty: 90 TABLET | Refills: 2 | Status: SHIPPED | OUTPATIENT
Start: 2022-06-10 | End: 2023-03-07

## 2022-06-22 ENCOUNTER — VIRTUAL VISIT (OUTPATIENT)
Dept: PSYCHOLOGY | Facility: CLINIC | Age: 27
End: 2022-06-22
Payer: COMMERCIAL

## 2022-06-22 DIAGNOSIS — N52.2 DRUG-INDUCED ERECTILE DYSFUNCTION: ICD-10-CM

## 2022-06-22 DIAGNOSIS — F41.1 GENERALIZED ANXIETY DISORDER: ICD-10-CM

## 2022-06-22 DIAGNOSIS — F33.41 RECURRENT MAJOR DEPRESSIVE DISORDER, IN PARTIAL REMISSION (H): Primary | ICD-10-CM

## 2022-06-22 PROCEDURE — 99207 PR NO BILLABLE SERVICE THIS VISIT: CPT | Performed by: MARRIAGE & FAMILY THERAPIST

## 2022-06-22 PROCEDURE — 90837 PSYTX W PT 60 MINUTES: CPT | Mod: 95 | Performed by: MARRIAGE & FAMILY THERAPIST

## 2022-06-22 NOTE — PROGRESS NOTES
[facility] - Progress Note    Date of Service: 22   Name: Ilan Bazzi  : 1995  Medical Record Number: [id number]  Treating Provider: PARISH Casiano LADC  Type of Session: Individual  Present in Session: pt  Session Start and Stop Time: 9063-2452  Number of Minutes:  54    SERVICE MODALITY:  Video Visit:      Provider verified identity through the following two step process.  Patient provided:  Patient  and Patient address    Telemedicine Visit: The patient's condition can be safely assessed and treated via synchronous audio and visual telemedicine encounter.      Reason for Telemedicine Visit: Services only offered telehealth    Originating Site (Patient Location): Patient's home    Distant Site (Provider Location): Provider Remote Setting- Home Office    Consent:  The patient/guardian has verbally consented to: the potential risks and benefits of telemedicine (video visit) versus in person care; bill my insurance or make self-payment for services provided; and responsibility for payment of non-covered services.     Patient would like the video invitation sent by:  My Chart    Mode of Communication:  Video Conference via [facility]    As the provider I attest to compliance with applicable laws and regulations related to telemedicine.    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  302.85 (F64.1) Gender dysphoria in Adolescents and Adults  Posttransition  ADHD by history   Provisional Diagnostic Hypothesis (Explain R/O, other Provisional Diagnosis, and why alternative Diagnosis that were considered were ruled out): NA      Current Reported Symptoms and Status update:  Pt wants to increase boundaries around sexual expression   Pt wants to integrate sexual identity  "and expression with partners       Progress Toward Treatment Goals:   Minimal progress - PREPARATION (Decided to change - considering how); Intervened by negotiating a change plan and determining options / strategies for behavior change, identifying triggers, exploring social supports, and working towards setting a date to begin behavior change    Therapeutic Interventions/Treatment Strategies:    Area(s) of treatment focus addressed in this session included Symptom Management and Develop Socialization / Interpersonal Relationship Skills.    Start thinking about and creating a go green check list-there may need to be different check lists for different types of play -please be detailed -think of it as a \"pre flight checklist\"    Been together 6 years, before transition and knowing is trans, before knowledgeable about ongoing consent, before knowing adhd, or knowing about some social cue, confusing feelings, had a crush on mutual friend, communicated about it, expressed to valentin that she wished she looked like crush MJ, it was hurtful to valentin because of body image issues, the hurt hasnt gone away, trust if dontae is attracted to valentin, issues with respecting the no, after the transition has been a change in relationship, valentin trying to figure out what sexuality means for her, confidence in sexuality/trying new things is hard-has been laughed at, struggling with asserting, has been more passive in relationship as a result, valentin was not comfortable with me initiating sexual encounters, dontae is scared to initiation, worried about being to pushy or saying something stupid and being laughed at, currently there is a disconnect, can talk about rekindling but there is a block-they say they want things and nothing ends up happening-have not had consistent sex in 2 years, last time was July of last year, they are poly-valentin has more sexual partners, dontae has had 2 sexual partners-fear over screwing up " relationship-    Psychotherapist offered support, feedback and validation and reinforced use of skills. Treatment modalities used include Dialectical Behavioral Therapy and sex therapy . Interventions include Cognitive Restructuring:  Facilitated recognition of the connection between negative thoughts and negative core beliefs and Relationship Skills: Assisted patients in implementing more effective communication skills in their relationships and Discussed strategies to promote healthier understanding of interpersonal relationships.  Support, Feedback, Problem Solving and Clarification    Patient Response:   Patient responded to session by accepting feedback, listening, focusing on goals, being attentive, accepting support, appearing alert and verbalizing understanding  Possible barriers to participation / learning include: and no barriers identified    Current Mental Status Exam:   Appearance:  Appropriate   Eye Contact:  Good   Attitude / Demeanor: Cooperative   Speech      Rate / Production: Normal/ Responsive      Volume:  Normal  volume  Orientation:  All  Mood:   Anxious  Depressed   Affect:   Appropriate  Blunted   Thought Content: Clear   Insight:   Good       Plan/Need for Future Services:  Return for therapy in 2 weeks to treat diagnosed problems.    Patient has a current master individualized treatment plan.  See Epic treatment plan for more information.    Assignment:  Try out the checklist-notice if things need to be tweaked-notice discomfort and anxiety/awkwardness    Interactive Complexity:  There are four specific communication difficulties that complicate the work of the primary psychiatric procedure.  Interactive complexity (+98705) may be reported when at least one of these difficulties is present.    Communication difficulties present during current the psychiatric procedure include:  1. None.      Signature/Title:    PARISH Casiano, [facility]

## 2022-07-06 ENCOUNTER — VIRTUAL VISIT (OUTPATIENT)
Dept: PSYCHOLOGY | Facility: CLINIC | Age: 27
End: 2022-07-06
Payer: COMMERCIAL

## 2022-07-06 DIAGNOSIS — N52.2 DRUG-INDUCED ERECTILE DYSFUNCTION: ICD-10-CM

## 2022-07-06 DIAGNOSIS — F41.1 GENERALIZED ANXIETY DISORDER: Primary | ICD-10-CM

## 2022-07-06 PROCEDURE — 99207 PR NO BILLABLE SERVICE THIS VISIT: CPT | Performed by: MARRIAGE & FAMILY THERAPIST

## 2022-07-06 PROCEDURE — 90837 PSYTX W PT 60 MINUTES: CPT | Mod: 95 | Performed by: MARRIAGE & FAMILY THERAPIST

## 2022-07-06 ASSESSMENT — PATIENT HEALTH QUESTIONNAIRE - PHQ9
SUM OF ALL RESPONSES TO PHQ QUESTIONS 1-9: 4
SUM OF ALL RESPONSES TO PHQ QUESTIONS 1-9: 4
10. IF YOU CHECKED OFF ANY PROBLEMS, HOW DIFFICULT HAVE THESE PROBLEMS MADE IT FOR YOU TO DO YOUR WORK, TAKE CARE OF THINGS AT HOME, OR GET ALONG WITH OTHER PEOPLE: SOMEWHAT DIFFICULT

## 2022-07-06 NOTE — PROGRESS NOTES
[facility] - Progress Note    Date of Service: 22   Name: Ilan Bazzi  : 1995  Medical Record Number: [id number]  Treating Provider: PARISH Casiano LADC  Type of Session: Individual  Present in Session: pt  Session Start and Stop Time: 0739-5946  Number of Minutes:  55    SERVICE MODALITY:  Video Visit:      Provider verified identity through the following two step process.  Patient provided:  Patient is known previously to provider    Telemedicine Visit: The patient's condition can be safely assessed and treated via synchronous audio and visual telemedicine encounter.      Reason for Telemedicine Visit: Services only offered telehealth    Originating Site (Patient Location): Patient's home    Distant Site (Provider Location): Provider Remote Setting- Home Office    Consent:  The patient/guardian has verbally consented to: the potential risks and benefits of telemedicine (video visit) versus in person care; bill my insurance or make self-payment for services provided; and responsibility for payment of non-covered services.     Patient would like the video invitation sent by:  My Chart    Mode of Communication:  Video Conference via [facility]    As the provider I attest to compliance with applicable laws and regulations related to telemedicine.    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  302.85 (F64.1) Gender dysphoria in Adolescents and Adults  Posttransition  ADHD by history   Provisional Diagnostic Hypothesis (Explain R/O, other Provisional Diagnosis, and why alternative Diagnosis that were considered were ruled out): NA      Current Reported Symptoms and Status update:  Pt wants to increase boundaries around sexual expression   Pt wants to integrate sexual  identity and expression with partners     Progress Toward Treatment Goals:   Minimal progress - ACTION (Actively working towards change); Intervened by reinforcing change plan / affirming steps taken    Therapeutic Interventions/Treatment Strategies:    Area(s) of treatment focus addressed in this session included Symptom Management and Develop Socialization / Interpersonal Relationship Skills.    Assignment:  Try out the checklist-notice if things need to be tweaked-notice discomfort and anxiety/awkwardness    hasnt felt very sexual in the past few weeks, has been isolating more, has noticed that she has been reaching out to people more to regulate herself     Psychotherapist offered support, feedback and validation and reinforced use of skills. Treatment modalities used include Motivational Interviewing and Dialectical Behavioral Therapy. Interventions include Relationship Skills: Assisted patients in implementing more effective communication skills in their relationships, Encouraged development and maintenance  of healthy boundaries and Discussed strategies to promote healthier understanding of interpersonal relationships.  Support, Feedback, Problem Solving, Clarification and Education    Patient Response:   Patient responded to session by accepting feedback, listening, focusing on goals, being attentive, accepting support, appearing alert and verbalizing understanding  Possible barriers to participation / learning include: and no barriers identified    Current Mental Status Exam:   Appearance:  Appropriate   Eye Contact:  Good   Attitude / Demeanor: Cooperative   Speech      Rate / Production: Normal/ Responsive      Volume:  Normal  volume  Orientation:  All  Mood:   Anxious   Affect:   Constricted   Thought Content: Clear   Insight:   Good       Plan/Need for Future Services:  Return for therapy in 2 weeks to treat diagnosed problems.    Patient has a current master individualized treatment plan.  See Epic  treatment plan for more information.    Assignment:  Work on communicating needs with friends     Interactive Complexity:  There are four specific communication difficulties that complicate the work of the primary psychiatric procedure.  Interactive complexity (+82846) may be reported when at least one of these difficulties is present.    Communication difficulties present during current the psychiatric procedure include:  1. None.      Signature/Title:    Eros Coppola, LMFT, [facility]       Answers for HPI/ROS submitted by the patient on 7/6/2022  If you checked off any problems, how difficult have these problems made it for you to do your work, take care of things at home, or get along with other people?: Somewhat difficult  PHQ9 TOTAL SCORE: 4

## 2022-07-27 ENCOUNTER — VIRTUAL VISIT (OUTPATIENT)
Dept: PSYCHOLOGY | Facility: CLINIC | Age: 27
End: 2022-07-27
Payer: COMMERCIAL

## 2022-07-27 DIAGNOSIS — N52.2 DRUG-INDUCED ERECTILE DYSFUNCTION: ICD-10-CM

## 2022-07-27 DIAGNOSIS — F41.1 GENERALIZED ANXIETY DISORDER: Primary | ICD-10-CM

## 2022-07-27 PROCEDURE — 90837 PSYTX W PT 60 MINUTES: CPT | Mod: 95 | Performed by: MARRIAGE & FAMILY THERAPIST

## 2022-07-27 ASSESSMENT — PATIENT HEALTH QUESTIONNAIRE - PHQ9
SUM OF ALL RESPONSES TO PHQ QUESTIONS 1-9: 6
10. IF YOU CHECKED OFF ANY PROBLEMS, HOW DIFFICULT HAVE THESE PROBLEMS MADE IT FOR YOU TO DO YOUR WORK, TAKE CARE OF THINGS AT HOME, OR GET ALONG WITH OTHER PEOPLE: SOMEWHAT DIFFICULT
SUM OF ALL RESPONSES TO PHQ QUESTIONS 1-9: 6

## 2022-07-27 NOTE — PROGRESS NOTES
[facility] - Progress Note    Date of Service: 22   Name: Ilan Bazzi  : 1995  Medical Record Number: [id number]  Treating Provider: PARISH Casiano LADC  Type of Session: Individual  Present in Session: josé miguel  Session Start and Stop Time: 1382-0399  Number of Minutes:  55    SERVICE MODALITY:  Video Visit:      Provider verified identity through the following two step process.  Patient provided:  Patient is known previously to provider    Telemedicine Visit: The patient's condition can be safely assessed and treated via synchronous audio and visual telemedicine encounter.      Reason for Telemedicine Visit: Services only offered telehealth    Originating Site (Patient Location): Patient's home    Distant Site (Provider Location): Provider Remote Setting- Home Office    Consent:  The patient/guardian has verbally consented to: the potential risks and benefits of telemedicine (video visit) versus in person care; bill my insurance or make self-payment for services provided; and responsibility for payment of non-covered services.     Patient would like the video invitation sent by:  My Chart    Mode of Communication:  Video Conference via [facility]    As the provider I attest to compliance with applicable laws and regulations related to telemedicine.    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  302.85 (F64.1) Gender dysphoria in Adolescents and Adults  Posttransition  ADHD by history   Provisional Diagnostic Hypothesis (Explain R/O, other Provisional Diagnosis, and why alternative Diagnosis that were considered were ruled out): NA      Current Reported Symptoms and Status update:  Pt wants to increase boundaries around sexual expression   Pt wants to integrate sexual  identity and expression with partners       Progress Toward Treatment Goals:   Minimal progress - PREPARATION (Decided to change - considering how); Intervened by negotiating a change plan and determining options / strategies for behavior change, identifying triggers, exploring social supports, and working towards setting a date to begin behavior change    Therapeutic Interventions/Treatment Strategies:    Area(s) of treatment focus addressed in this session included Symptom Management and Develop Socialization / Interpersonal Relationship Skills.    Improve communication with friends  Things have been pretty good for the past few weeks, hasnt had much going on-has been having money worries-no sexual activity with others recently, was able to talk to friends partner-apologize to him directly, he accepted the apology, struggling with adhd and starting activities-    Psychotherapist offered support, feedback and validation and reinforced use of skills. Treatment modalities used include Motivational Interviewing and Dialectical Behavioral Therapy. Interventions include Behavioral Activation: Reinforced benefits/challenges of change process through applying skills to replace unwanted behaviors and Encouraged strategies to reduce individual procrastination and increase motivation by increasing goal-directed activities to enhance mood and reduce symptoms. and Cognitive Restructuring:  Explored impact of ineffective thoughts / distortions on mood and activity.  Support, Structured Activity, Problem Solving, Clarification and Education    Patient Response:   Patient responded to session by accepting feedback, listening, being attentive, accepting support, appearing alert and verbalizing understanding  Possible barriers to participation / learning include: and no barriers identified    Current Mental Status Exam:   Appearance:  Appropriate   Eye Contact:  Good   Attitude / Demeanor: Cooperative   Speech      Rate /  Production: Normal/ Responsive      Volume:  Normal  volume  Orientation:  All  Mood:   Anxious   Affect:   Appropriate   Thought Content: Clear   Insight:   Good       Plan/Need for Future Services:  Return for therapy in 2 weeks to treat diagnosed problems.    Patient has a current master individualized treatment plan.  See Epic treatment plan for more information.    Assignment:  Use the strategies discussed to decrease procrastination    Interactive Complexity:  There are four specific communication difficulties that complicate the work of the primary psychiatric procedure.  Interactive complexity (+51371) may be reported when at least one of these difficulties is present.    Communication difficulties present during current the psychiatric procedure include:  1. None.      Signature/Title:    Eros Coppola, LMFT, [facility]       Answers for HPI/ROS submitted by the patient on 7/27/2022  If you checked off any problems, how difficult have these problems made it for you to do your work, take care of things at home, or get along with other people?: Somewhat difficult  PHQ9 TOTAL SCORE: 6

## 2022-07-28 ENCOUNTER — OFFICE VISIT (OUTPATIENT)
Dept: FAMILY MEDICINE | Facility: CLINIC | Age: 27
End: 2022-07-28
Payer: COMMERCIAL

## 2022-07-28 VITALS
HEIGHT: 71 IN | OXYGEN SATURATION: 96 % | WEIGHT: 172 LBS | BODY MASS INDEX: 24.08 KG/M2 | TEMPERATURE: 98.5 F | HEART RATE: 96 BPM | DIASTOLIC BLOOD PRESSURE: 78 MMHG | SYSTOLIC BLOOD PRESSURE: 120 MMHG

## 2022-07-28 DIAGNOSIS — F32.A DEPRESSION, UNSPECIFIED DEPRESSION TYPE: ICD-10-CM

## 2022-07-28 DIAGNOSIS — Z11.3 ROUTINE SCREENING FOR STI (SEXUALLY TRANSMITTED INFECTION): ICD-10-CM

## 2022-07-28 DIAGNOSIS — I86.1 LEFT VARICOCELE: ICD-10-CM

## 2022-07-28 DIAGNOSIS — F64.0 GENDER DYSPHORIA IN ADOLESCENT AND ADULT: ICD-10-CM

## 2022-07-28 DIAGNOSIS — F90.9 ATTENTION DEFICIT HYPERACTIVITY DISORDER (ADHD), UNSPECIFIED ADHD TYPE: ICD-10-CM

## 2022-07-28 DIAGNOSIS — N50.89 TESTICULAR MASS: ICD-10-CM

## 2022-07-28 DIAGNOSIS — K60.2 ANAL FISSURE: ICD-10-CM

## 2022-07-28 DIAGNOSIS — K62.5 RECTAL BLEEDING: ICD-10-CM

## 2022-07-28 DIAGNOSIS — Z00.00 ROUTINE GENERAL MEDICAL EXAMINATION AT A HEALTH CARE FACILITY: Primary | ICD-10-CM

## 2022-07-28 DIAGNOSIS — R53.83 OTHER FATIGUE: ICD-10-CM

## 2022-07-28 LAB
ESTRADIOL SERPL-MCNC: 101 PG/ML
FERRITIN SERPL-MCNC: 68 NG/ML (ref 6–409)
HGB BLD-MCNC: 15 G/DL (ref 11.7–17.7)
T PALLIDUM AB SER QL: NONREACTIVE

## 2022-07-28 PROCEDURE — 80053 COMPREHEN METABOLIC PANEL: CPT | Performed by: STUDENT IN AN ORGANIZED HEALTH CARE EDUCATION/TRAINING PROGRAM

## 2022-07-28 PROCEDURE — 87389 HIV-1 AG W/HIV-1&-2 AB AG IA: CPT | Performed by: STUDENT IN AN ORGANIZED HEALTH CARE EDUCATION/TRAINING PROGRAM

## 2022-07-28 PROCEDURE — 84403 ASSAY OF TOTAL TESTOSTERONE: CPT | Mod: KX | Performed by: STUDENT IN AN ORGANIZED HEALTH CARE EDUCATION/TRAINING PROGRAM

## 2022-07-28 PROCEDURE — 82728 ASSAY OF FERRITIN: CPT | Performed by: STUDENT IN AN ORGANIZED HEALTH CARE EDUCATION/TRAINING PROGRAM

## 2022-07-28 PROCEDURE — 85018 HEMOGLOBIN: CPT | Performed by: STUDENT IN AN ORGANIZED HEALTH CARE EDUCATION/TRAINING PROGRAM

## 2022-07-28 PROCEDURE — 36415 COLL VENOUS BLD VENIPUNCTURE: CPT | Performed by: STUDENT IN AN ORGANIZED HEALTH CARE EDUCATION/TRAINING PROGRAM

## 2022-07-28 PROCEDURE — 86780 TREPONEMA PALLIDUM: CPT | Performed by: STUDENT IN AN ORGANIZED HEALTH CARE EDUCATION/TRAINING PROGRAM

## 2022-07-28 PROCEDURE — 99395 PREV VISIT EST AGE 18-39: CPT | Mod: KX | Performed by: STUDENT IN AN ORGANIZED HEALTH CARE EDUCATION/TRAINING PROGRAM

## 2022-07-28 PROCEDURE — 99213 OFFICE O/P EST LOW 20 MIN: CPT | Mod: 25 | Performed by: STUDENT IN AN ORGANIZED HEALTH CARE EDUCATION/TRAINING PROGRAM

## 2022-07-28 PROCEDURE — 82670 ASSAY OF TOTAL ESTRADIOL: CPT | Mod: KX | Performed by: STUDENT IN AN ORGANIZED HEALTH CARE EDUCATION/TRAINING PROGRAM

## 2022-07-28 PROCEDURE — 86803 HEPATITIS C AB TEST: CPT | Performed by: STUDENT IN AN ORGANIZED HEALTH CARE EDUCATION/TRAINING PROGRAM

## 2022-07-28 PROCEDURE — 80061 LIPID PANEL: CPT | Performed by: STUDENT IN AN ORGANIZED HEALTH CARE EDUCATION/TRAINING PROGRAM

## 2022-07-28 RX ORDER — DEXTROAMPHETAMINE SACCHARATE, AMPHETAMINE ASPARTATE, DEXTROAMPHETAMINE SULFATE AND AMPHETAMINE SULFATE 5; 5; 5; 5 MG/1; MG/1; MG/1; MG/1
20 TABLET ORAL
Qty: 30 TABLET | Refills: 0 | Status: SHIPPED | OUTPATIENT
Start: 2022-07-28 | End: 2022-09-22

## 2022-07-28 RX ORDER — DEXTROAMPHETAMINE SACCHARATE, AMPHETAMINE ASPARTATE, DEXTROAMPHETAMINE SULFATE AND AMPHETAMINE SULFATE 7.5; 7.5; 7.5; 7.5 MG/1; MG/1; MG/1; MG/1
30 TABLET ORAL EVERY MORNING
Qty: 30 TABLET | Refills: 0 | Status: SHIPPED | OUTPATIENT
Start: 2022-07-28 | End: 2022-09-22

## 2022-07-28 ASSESSMENT — ENCOUNTER SYMPTOMS
MYALGIAS: 0
COUGH: 0
FEVER: 0
PALPITATIONS: 0
HEADACHES: 1
BREAST MASS: 0
FREQUENCY: 0
HEMATOCHEZIA: 1
DIARRHEA: 0
CONSTIPATION: 0
WEAKNESS: 0
NAUSEA: 0
PARESTHESIAS: 0
SORE THROAT: 0
SHORTNESS OF BREATH: 0
JOINT SWELLING: 0
ABDOMINAL PAIN: 0
EYE PAIN: 0
DYSURIA: 0
HEMATURIA: 0
ARTHRALGIAS: 0
DIZZINESS: 0
CHILLS: 0
NERVOUS/ANXIOUS: 0
HEARTBURN: 0

## 2022-07-28 NOTE — PROGRESS NOTES
SUBJECTIVE:   CC: Ilan Bazzi is an 26 year old woman who presents for preventive health visit.     Chief Complaint   Patient presents with     Physical     Pt is present today for physical and medication refills      Testicular concern - lump. Not painful. Present for a while     Mentioned a while ago having blood in stool consistently -- if that was a persistent issue, might take a look at that   Can mitigate it most of hte time by not straining. If no straining, usually blood. If strains a little bit, feels like it doesn't hve to stretch as much.   - pain w/ defecation, not consistently. Sometimes feels like pulling off a scab.   - months. Has never had before   - not constipated. Does have anal penetration w/ toys, not penises. not painful in the same way. Some larger caliber.   - has not looked, has not felt very in depth   - small amounts of bright red, sometimes medium       Healthy Habits:     Getting at least 3 servings of Calcium per day:  NO    Bi-annual eye exam:  NO    Dental care twice a year:  NO    Sleep apnea or symptoms of sleep apnea:  Daytime drowsiness    Diet:  Regular (no restrictions)    Frequency of exercise:  2-3 days/week    Duration of exercise:  15-30 minutes    Taking medications regularly:  Yes    Medication side effects:  None    PHQ-2 Total Score: 1    Additional concerns today:  Yes      Today's PHQ-2 Score:   PHQ-2 ( 1999 Pfizer) 7/28/2022   Q1: Little interest or pleasure in doing things 1   Q2: Feeling down, depressed or hopeless 0   PHQ-2 Score 1   PHQ-2 Total Score (12-17 Years)- Positive if 3 or more points; Administer PHQ-A if positive -   Q1: Little interest or pleasure in doing things Several days   Q2: Feeling down, depressed or hopeless Not at all   PHQ-2 Score 1         Social History     Tobacco Use     Smoking status: Never Smoker     Smokeless tobacco: Never Used   Substance Use Topics     Alcohol use: Not Currently       Alcohol Use 7/28/2022   Prescreen: >3  "drinks/day or >7 drinks/week? Not Applicable   Prescreen: >3 drinks/day or >7 drinks/week? -   No flowsheet data found.    Reviewed orders with patient.  Reviewed health maintenance and updated orders accordingly - Yes  Labs reviewed in EPIC    Breast Cancer Screening:  Any new diagnosis of family breast, ovarian, or bowel cancer? No    Reviewed and updated as needed this visit by clinical staff   Tobacco  Allergies  Meds  Problems    Fam Hx            Reviewed and updated as needed this visit by Provider     Meds  Problems    Fam Hx               Review of Systems   Constitutional: Negative for chills and fever.   HENT: Negative for congestion, ear pain, hearing loss and sore throat.    Eyes: Negative for pain and visual disturbance.   Respiratory: Negative for cough and shortness of breath.    Cardiovascular: Negative for chest pain and palpitations.   Gastrointestinal: Negative for abdominal pain, constipation, diarrhea and nausea.   Genitourinary: Negative for dysuria, frequency, genital sores, hematuria and urgency.   Musculoskeletal: Negative for arthralgias, joint swelling and myalgias.   Skin: Negative for rash.   Neurological: Positive for headaches. Negative for dizziness and weakness.   Psychiatric/Behavioral: The patient is not nervous/anxious.         OBJECTIVE:   /78   Pulse 96   Temp 98.5  F (36.9  C) (Oral)   Ht 1.803 m (5' 11\")   Wt 78 kg (172 lb)   SpO2 96%   BMI 23.99 kg/m    Physical Exam  GENERAL: healthy, alert and no distress  EYES: Eyes grossly normal to inspection, PERRL and conjunctivae and sclerae normal  HENT:  nose and mouth without ulcers or lesions  NECK: no adenopathy, no asymmetry, masses, or scars and thyroid normal to palpation  RESP: lungs clear to auscultation - no rales, rhonchi or wheezes  CV: regular rate and rhythm, normal S1 S2, no S3 or S4, no murmur, click or rub, no peripheral edema and peripheral pulses strong  : normal phallus. Left-sided moderate " varicocele, nontender. No lesions. Normal testicles without tenderness or mass.   Rectum: small anal fissure at 12 o'clock position. No masses, no external hemorrhoids.  MS: no gross musculoskeletal defects noted, no edema  SKIN: no suspicious lesions or rashes  NEURO: Normal strength and tone, mentation intact and speech normal  PSYCH: mentation appears normal, affect normal/bright      ASSESSMENT/PLAN:   Ilan was seen today for physical.    Diagnoses and all orders for this visit:    Routine general medical examination at a health care facility    Gender dysphoria in adolescent and adult  Side effects better back on oral estradiol. Due for labs since switching.  -     Estradiol; Future  -     Testosterone total; Future  -     Hemoglobin; Future  -     Comprehensive metabolic panel; Future  -     Lipid panel reflex to direct LDL Fasting; Future  -     Estradiol  -     Testosterone total  -     Hemoglobin  -     Comprehensive metabolic panel  -     Lipid panel reflex to direct LDL Fasting    Routine screening for STI (sexually transmitted infection)  No indication for oral or rectal swabs  -     HIV Antigen Antibody Combo; Future  -     Treponema Abs w Reflex to RPR and Titer; Future  -     Hepatitis C Screen Reflex to HCV RNA Quant and Genotype; Future  -     HIV Antigen Antibody Combo  -     Treponema Abs w Reflex to RPR and Titer  -     Hepatitis C Screen Reflex to HCV RNA Quant and Genotype    Left varicocele  Did have a past ultrasound confirming this. No pain and pt not concerned with future fertility. Encouraged close fitting underwear for support. Refer if symptomatic or pt requests.    Rectal bleeding  Anal fissure  Counseled on hydration, fiber, avoiding anal penetration until healed.   -    nifedipine 0.2% in white petrolatum 0.2 % OINT ointment; Apply topically 2 times daily    Depression, unspecified depression type  Controlled on venlafaxine    Attention deficit hyperactivity disorder (ADHD),  "unspecified ADHD type  -     amphetamine-dextroamphetamine (ADDERALL) 20 MG tablet; Take 1 tablet (20 mg) by mouth daily at 2 pm  -     amphetamine-dextroamphetamine (ADDERALL) 30 MG tablet; Take 1 tablet (30 mg) by mouth every morning    Other fatigue  RLS  -     Ferritin; Future  -     Ferritin      COUNSELING:  Reviewed preventive health counseling, as reflected in patient instructions    Estimated body mass index is 23.99 kg/m  as calculated from the following:    Height as of this encounter: 1.803 m (5' 11\").    Weight as of this encounter: 78 kg (172 lb).        She reports that she has never smoked. She has never used smokeless tobacco.      Counseling Resources:  ATP IV Guidelines  Pooled Cohorts Equation Calculator  Breast Cancer Risk Calculator  BRCA-Related Cancer Risk Assessment: FHS-7 Tool  FRAX Risk Assessment  ICSI Preventive Guidelines  Dietary Guidelines for Americans, 2010  [employer]'s MyPlate  ASA Prophylaxis  Lung CA Screening    DO TANIA Snowden [facility] TERR[facility]  Answers for HPI/ROS submitted by the patient on 7/28/2022  Blood in stool: Yes  heartburn: No  peripheral edema: No  mood changes: No  Skin sensation changes: No  pelvic pain: No  vaginal bleeding: No  vaginal discharge: No  tenderness: No  breast mass: No  breast discharge: No  impotence: No  penile discharge: No      "

## 2022-07-28 NOTE — PROGRESS NOTES
SUBJECTIVE:   CC: Ilan Bazzi is an 26 year old woman who presents for preventive health visit.     {Split Bill scripting  The purpose of this visit is to discuss your medical history and prevent health problems before you are sick. You may be responsible for a co-pay, coinsurance, or deductible if your visit today includes services such as checking on a sore throat, having an x-ray or lab test, or treating and evaluating a new or existing condition :645829}  {Patient advised of split billing (Optional):708492}  Healthy Habits:     Getting at least 3 servings of Calcium per day:  NO    Bi-annual eye exam:  NO    Dental care twice a year:  NO    Sleep apnea or symptoms of sleep apnea:  Daytime drowsiness    Diet:  Regular (no restrictions)    Frequency of exercise:  2-3 days/week    Duration of exercise:  15-30 minutes    Taking medications regularly:  Yes    Medication side effects:  None    PHQ-2 Total Score: 1    Additional concerns today:  Yes    {Add if <65 person on Medicare  - Required Questions (Optional):979176}  {Outside tests to abstract? :991949}    {additional problems to add (Optional):801431}    Today's PHQ-2 Score:   PHQ-2 ( 1999 Pfizer) 7/28/2022   Q1: Little interest or pleasure in doing things 1   Q2: Feeling down, depressed or hopeless 0   PHQ-2 Score 1   PHQ-2 Total Score (12-17 Years)- Positive if 3 or more points; Administer PHQ-A if positive -   Q1: Little interest or pleasure in doing things Several days   Q2: Feeling down, depressed or hopeless Not at all   PHQ-2 Score 1       Abuse: Current or Past (Physical, Sexual or Emotional) - { :199236}  Do you feel safe in your environment? { :394197}    Have you ever done Advance Care Planning? (For example, a Health Directive, POLST, or a discussion with a medical provider or your loved ones about your wishes): { :858001}    Social History     Tobacco Use     Smoking status: Never Smoker     Smokeless tobacco: Never Used   Substance Use  "Topics     Alcohol use: Not Currently     {Rooming Staff- Complete this question if Prescreen response is not shown below for today's visit. If you drink alcohol do you typically have >3 drinks per day or >7 drinks per week? (Optional):914567}    Alcohol Use 7/28/2022   Prescreen: >3 drinks/day or >7 drinks/week? Not Applicable   {add AUDIT responses (Optional) (A score of 7 for adult men is an indication of hazardous drinking; a score of 8 or more is an indication of an alcohol use disorder.  A score of 7 or more for adult women is an indication of hazardous drinking or an alchohol use disorder):624032}    Reviewed orders with patient.  Reviewed health maintenance and updated orders accordingly - { :787717::\"Yes\"}  {Chronicprobdata (optional):463411}    Breast Cancer Screening:  Any new diagnosis of family breast, ovarian, or bowel cancer? {Yes_Link to Screening / No:037577}    FHS-7: No flowsheet data found.  {If any of the questions to the BCRA (FHS-7) are answered yes, consider ordering referral for genetic counseling (Optional) :387430::\"click delete button to remove this line now\"}  {AMB Mammogram Decision Support (Optional) :038240}  Pertinent mammograms are reviewed under the imaging tab.    History of abnormal Pap smear: { :032059}     Reviewed and updated as needed this visit by clinical staff   Tobacco  Allergies                 Reviewed and updated as needed this visit by Provider                   {HISTORY OPTIONS (Optional):317690}    Review of Systems   Constitutional: Negative for chills and fever.   HENT: Negative for congestion, ear pain, hearing loss and sore throat.    Eyes: Negative for pain and visual disturbance.   Respiratory: Negative for cough and shortness of breath.    Cardiovascular: Negative for chest pain and palpitations.   Gastrointestinal: Negative for abdominal pain, constipation, diarrhea and nausea.   Genitourinary: Negative for dysuria, frequency, genital sores, hematuria and " "urgency.   Musculoskeletal: Negative for arthralgias, joint swelling and myalgias.   Skin: Negative for rash.   Neurological: Positive for headaches. Negative for dizziness and weakness.   Psychiatric/Behavioral: The patient is not nervous/anxious.      {FEMALE ROS (Optional):788423}     OBJECTIVE:   /78   Pulse 96   Temp 98.5  F (36.9  C) (Oral)   Ht 1.803 m (5' 11\")   Wt 78 kg (172 lb)   SpO2 96%   BMI 23.99 kg/m    Physical Exam  {Exam Choices (Optional):200276}    {Diagnostic Test Results (Optional):610982::\"Diagnostic Test Results:\",\"Labs reviewed in Epic\"}    ASSESSMENT/PLAN:   {Diag Picklist:442137}    {Patient advised of split billing (Optional):272029}    COUNSELING:  {FEMALE COUNSELING MESSAGES:811765::\"Reviewed preventive health counseling, as reflected in patient instructions\"}    Estimated body mass index is 23.99 kg/m  as calculated from the following:    Height as of this encounter: 1.803 m (5' 11\").    Weight as of this encounter: 78 kg (172 lb).    {Weight Management Plan (ACO) Complete if BMI is abnormal-  Ages 18-64  BMI >24.9.  Age 65+ with BMI <23 or >30 (Optional):352293}    She reports that she has never smoked. She has never used smokeless tobacco.      Counseling Resources:  ATP IV Guidelines  Pooled Cohorts Equation Calculator  Breast Cancer Risk Calculator  BRCA-Related Cancer Risk Assessment: FHS-7 Tool  FRAX Risk Assessment  ICSI Preventive Guidelines  Dietary Guidelines for Americans, 2010  USDA's MyPlate  ASA Prophylaxis  Lung CA Screening    DO TANIA Snowden [facility] TERR[facility]  "

## 2022-07-28 NOTE — PATIENT INSTRUCTIONS
Preventive Health Recommendations  Ages 26 - 39  Yearly exam:   See your health care provider every year in order to  Review health changes.   Discuss preventive care.    Review your medicines if you your doctor has prescribed any.    After age 30:    You should be tested each year for STDs (sexually transmitted diseases), if you're at risk.   Talk to your provider about how often to have your cholesterol checked.  If you are at risk for diabetes, you should have a diabetes test (fasting glucose).  Shots: Get a flu shot each year. Get a tetanus shot every 10 years.   Nutrition:   Eat at least 5 servings of fruits and vegetables each day.  Eat whole-grain bread, whole-wheat pasta and brown rice instead of white grains and rice.  Get adequate Calcium and Vitamin D.     Lifestyle  Exercise at least 150 minutes a week (30 minutes a day, 5 days of the week). This will help you control your weight and prevent disease.  Limit alcohol to one drink per day.  No smoking.   Wear sunscreen to prevent skin cancer.  See your dentist every six months for an exam and cleaning.

## 2022-07-29 LAB
ALBUMIN SERPL BCG-MCNC: 4.4 G/DL (ref 3.5–5.2)
ALP SERPL-CCNC: 45 U/L (ref 35–129)
ALT SERPL W P-5'-P-CCNC: 11 U/L (ref 10–50)
ANION GAP SERPL CALCULATED.3IONS-SCNC: 10 MMOL/L (ref 7–15)
AST SERPL W P-5'-P-CCNC: 27 U/L (ref 10–50)
BILIRUB SERPL-MCNC: 0.3 MG/DL
BUN SERPL-MCNC: 13.1 MG/DL (ref 6–20)
CALCIUM SERPL-MCNC: 9.4 MG/DL (ref 8.6–10)
CHLORIDE SERPL-SCNC: 103 MMOL/L (ref 98–107)
CHOLEST SERPL-MCNC: 184 MG/DL
CREAT SERPL-MCNC: 0.99 MG/DL (ref 0.51–1.17)
DEPRECATED HCO3 PLAS-SCNC: 24 MMOL/L (ref 22–29)
GFR SERPL CREATININE-BSD FRML MDRD: 80 ML/MIN/1.73M2
GLUCOSE SERPL-MCNC: 82 MG/DL (ref 70–99)
HCV AB SERPL QL IA: NONREACTIVE
HDLC SERPL-MCNC: 49 MG/DL
HIV 1+2 AB+HIV1 P24 AG SERPL QL IA: NONREACTIVE
LDLC SERPL CALC-MCNC: 116 MG/DL
NONHDLC SERPL-MCNC: 135 MG/DL
POTASSIUM SERPL-SCNC: 4.3 MMOL/L (ref 3.4–5.3)
PROT SERPL-MCNC: 7.1 G/DL (ref 6.4–8.3)
SODIUM SERPL-SCNC: 137 MMOL/L (ref 136–145)
TRIGL SERPL-MCNC: 96 MG/DL

## 2022-08-01 LAB — TESTOST SERPL-MCNC: 459 NG/DL (ref 8–950)

## 2022-09-11 ENCOUNTER — HEALTH MAINTENANCE LETTER (OUTPATIENT)
Age: 27
End: 2022-09-11

## 2022-09-22 ENCOUNTER — TELEPHONE (OUTPATIENT)
Dept: FAMILY MEDICINE | Facility: CLINIC | Age: 27
End: 2022-09-22

## 2022-09-22 NOTE — TELEPHONE ENCOUNTER
[facility] phone call message- patient requesting a refill:    Full Medication Name: amphetamine-dextroamphetamine (ADDERALL) 30 MG tablet    amphetamine-dextroamphetamine (ADDERALL) 20 MG tablet    estradiol (ESTRACE) 2 MG tablet    Dose: See chart     Pharmacy confirmed as   [facility] - [place], [place] - [address]  [address]  [facility] [place] [facility] [place] [facility] [address]  Phone: [phone] Fax: [phone]    : Yes    Additional Comments: Pharmacy called to request a refill of listed medications.      OK to leave a message on voice mail? Yes    Primary language: English      needed? No    Call taken on September 22, 2022 at 10:09 AM by Lidia Sol

## 2022-09-28 ENCOUNTER — VIRTUAL VISIT (OUTPATIENT)
Dept: PSYCHOLOGY | Facility: CLINIC | Age: 27
End: 2022-09-28
Payer: COMMERCIAL

## 2022-09-28 DIAGNOSIS — N52.2 DRUG-INDUCED ERECTILE DYSFUNCTION: ICD-10-CM

## 2022-09-28 DIAGNOSIS — F41.1 GENERALIZED ANXIETY DISORDER: Primary | ICD-10-CM

## 2022-09-28 PROCEDURE — 90837 PSYTX W PT 60 MINUTES: CPT | Mod: 95 | Performed by: MARRIAGE & FAMILY THERAPIST

## 2022-09-28 ASSESSMENT — PATIENT HEALTH QUESTIONNAIRE - PHQ9
SUM OF ALL RESPONSES TO PHQ QUESTIONS 1-9: 8
SUM OF ALL RESPONSES TO PHQ QUESTIONS 1-9: 8
10. IF YOU CHECKED OFF ANY PROBLEMS, HOW DIFFICULT HAVE THESE PROBLEMS MADE IT FOR YOU TO DO YOUR WORK, TAKE CARE OF THINGS AT HOME, OR GET ALONG WITH OTHER PEOPLE: VERY DIFFICULT

## 2022-09-28 NOTE — PROGRESS NOTES
[facility] - Progress Note    Date of Service: 22   Name: Ilan Bazzi  : 1995  Medical Record Number: [id number]  Treating Provider: PARISH Casiano LADC  Type of Session: Individual  Present in Session: pt  Session Start and Stop Time: 7358-4102  Number of Minutes:  56    SERVICE MODALITY:  Video Visit:      Provider verified identity through the following two step process.  Patient provided:  Patient is known previously to provider    Telemedicine Visit: The patient's condition can be safely assessed and treated via synchronous audio and visual telemedicine encounter.      Reason for Telemedicine Visit: Services only offered telehealth    Originating Site (Patient Location): Patient's home    Distant Site (Provider Location): Provider Remote Setting- Home Office    Consent:  The patient/guardian has verbally consented to: the potential risks and benefits of telemedicine (video visit) versus in person care; bill my insurance or make self-payment for services provided; and responsibility for payment of non-covered services.     Patient would like the video invitation sent by:  My Chart    Mode of Communication:  Video Conference via [facility]    As the provider I attest to compliance with applicable laws and regulations related to telemedicine.    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  302.85 (F64.1) Gender dysphoria in Adolescents and Adults  Posttransition  ADHD by history   Provisional Diagnostic Hypothesis (Explain R/O, other Provisional Diagnosis, and why alternative Diagnosis that were considered were ruled out): NA      Current Reported Symptoms and Status update:  Pt wants to increase boundaries around sexual expression   Pt wants to integrate sexual  identity and expression with partners       Progress Toward Treatment Goals:   Minimal progress - ACTION (Actively working towards change); Intervened by reinforcing change plan / affirming steps taken    Therapeutic Interventions/Treatment Strategies:    Area(s) of treatment focus addressed in this session included Symptom Management and Develop Socialization / Interpersonal Relationship Skills.    Assignment:  Use the strategies discussed to decrease procrastination    Valentin and pt talked about role of sex in relationship-has been having additional questioning about what label applies to sexuality best  Had been doing well with procrastination  She does not know how to approach sexual situations the same as to a man, she is attracted to women does not know how to have healthy sexual encounter-she needs to work on that   I am the only woman she has had a sexual relationship with, it was informed by before transition-asking her to touch or focus on breasts-had never done before, she was not sure how she felt about it, she is trying to figure it out, no sex moving forward until its figured out  Feeling lonely, has no physical affection-valentin is not meeting need,   Kink expression-gender expression does not matter  Outside of kink gender expression does matter          Psychotherapist offered support, feedback and validation and reinforced use of skills Treatment modalities used include Motivational Interviewing Dialectical Behavioral Therapy Mindfulness based intervention Sexual Health and Wellness Education Interventions include Behavioral Activation: Reinforced benefits/challenges of change process through applying skills to replace unwanted behaviors and Encouraged strategies to reduce individual procrastination and increase motivation by increasing goal-directed activities to enhance mood and reduce symptoms. and Relationship Skills: Assisted patients in implementing more effective communication skills in their  relationships and Discussed strategies to promote healthier understanding of interpersonal relationships  Support, Feedback, Structured Activity, Clarification and Education    Patient Response:   Patient responded to session by accepting feedback, listening, being attentive, accepting support and appearing alert  Possible barriers to participation / learning include: and no barriers identified    Current Mental Status Exam:   Appearance:  Appropriate   Eye Contact:  Good   Attitude / Demeanor: Cooperative   Speech      Rate / Production: Normal/ Responsive      Volume:  Normal  volume  Orientation:  All  Mood:   Anxious   Affect:   Appropriate   Thought Content: Clear   Insight:   Good       Plan/Need for Future Services:  Return for therapy in 2 weeks to treat diagnosed problems.    Patient has a current master individualized treatment plan.  See Epic treatment plan for more information.    Assignment:  Read procrastination handouts-message me questions  [phone] is the phone number for the , make sure to ask own therapist first about talking about sexuality/desires/concerns  Will continue to brainstorm ways to increase physical touch  Please glance over the sexuality/gender handout to help clarify anything for yourself      Interactive Complexity:  There are four specific communication difficulties that complicate the work of the primary psychiatric procedure.  Interactive complexity (+72302) may be reported when at least one of these difficulties is present.    Communication difficulties present during current the psychiatric procedure include:  1. None.      Signature/Title:    PARISH Casiano, [facility]     Answers for HPI/ROS submitted by the patient on 9/28/2022  If you checked off any problems, how difficult have these problems made it for you to do your work, take care of things at home, or get along with other people?: Very difficult  PHQ9 TOTAL SCORE: 8

## 2022-11-11 ENCOUNTER — MYC MEDICAL ADVICE (OUTPATIENT)
Dept: FAMILY MEDICINE | Facility: CLINIC | Age: 27
End: 2022-11-11

## 2022-11-11 DIAGNOSIS — F90.9 ATTENTION DEFICIT HYPERACTIVITY DISORDER (ADHD), UNSPECIFIED ADHD TYPE: ICD-10-CM

## 2022-11-11 RX ORDER — DEXTROAMPHETAMINE SACCHARATE, AMPHETAMINE ASPARTATE, DEXTROAMPHETAMINE SULFATE AND AMPHETAMINE SULFATE 5; 5; 5; 5 MG/1; MG/1; MG/1; MG/1
10 TABLET ORAL
Qty: 15 TABLET | Refills: 0 | Status: SHIPPED | OUTPATIENT
Start: 2022-11-14 | End: 2022-12-13

## 2022-11-11 RX ORDER — DEXTROAMPHETAMINE SACCHARATE, AMPHETAMINE ASPARTATE MONOHYDRATE, DEXTROAMPHETAMINE SULFATE AND AMPHETAMINE SULFATE 7.5; 7.5; 7.5; 7.5 MG/1; MG/1; MG/1; MG/1
30 CAPSULE, EXTENDED RELEASE ORAL DAILY
Qty: 30 CAPSULE | Refills: 0 | Status: SHIPPED | OUTPATIENT
Start: 2022-11-14 | End: 2022-12-13

## 2022-11-11 NOTE — TELEPHONE ENCOUNTER

## 2022-11-14 DIAGNOSIS — F90.9 ATTENTION DEFICIT HYPERACTIVITY DISORDER (ADHD), UNSPECIFIED ADHD TYPE: ICD-10-CM

## 2022-11-14 RX ORDER — DEXTROAMPHETAMINE SACCHARATE, AMPHETAMINE ASPARTATE, DEXTROAMPHETAMINE SULFATE AND AMPHETAMINE SULFATE 5; 5; 5; 5 MG/1; MG/1; MG/1; MG/1
TABLET ORAL
Qty: 15 TABLET | Refills: 0 | OUTPATIENT
Start: 2022-11-14

## 2022-11-14 RX ORDER — DEXTROAMPHETAMINE SULFATE, DEXTROAMPHETAMINE SACCHARATE, AMPHETAMINE SULFATE AND AMPHETAMINE ASPARTATE 7.5; 7.5; 7.5; 7.5 MG/1; MG/1; MG/1; MG/1
CAPSULE, EXTENDED RELEASE ORAL
Qty: 30 CAPSULE | Refills: 0 | OUTPATIENT
Start: 2022-11-14

## 2022-12-01 ENCOUNTER — MYC MEDICAL ADVICE (OUTPATIENT)
Dept: FAMILY MEDICINE | Facility: CLINIC | Age: 27
End: 2022-12-01

## 2022-12-01 DIAGNOSIS — K60.2 ANAL FISSURE: Primary | ICD-10-CM

## 2022-12-13 DIAGNOSIS — F90.9 ATTENTION DEFICIT HYPERACTIVITY DISORDER (ADHD), UNSPECIFIED ADHD TYPE: ICD-10-CM

## 2022-12-13 RX ORDER — DEXTROAMPHETAMINE SACCHARATE, AMPHETAMINE ASPARTATE, DEXTROAMPHETAMINE SULFATE AND AMPHETAMINE SULFATE 5; 5; 5; 5 MG/1; MG/1; MG/1; MG/1
TABLET ORAL
Qty: 15 TABLET | Refills: 0 | Status: SHIPPED | OUTPATIENT
Start: 2022-12-13 | End: 2023-01-13

## 2022-12-13 RX ORDER — DEXTROAMPHETAMINE SULFATE, DEXTROAMPHETAMINE SACCHARATE, AMPHETAMINE SULFATE AND AMPHETAMINE ASPARTATE 7.5; 7.5; 7.5; 7.5 MG/1; MG/1; MG/1; MG/1
CAPSULE, EXTENDED RELEASE ORAL
Qty: 30 CAPSULE | Refills: 0 | Status: SHIPPED | OUTPATIENT
Start: 2022-12-13 | End: 2023-01-13

## 2023-01-03 ENCOUNTER — MYC MEDICAL ADVICE (OUTPATIENT)
Dept: FAMILY MEDICINE | Facility: CLINIC | Age: 28
End: 2023-01-03

## 2023-01-03 DIAGNOSIS — F64.9 GENDER DYSPHORIA: Primary | ICD-10-CM

## 2023-01-04 NOTE — TELEPHONE ENCOUNTER
Pt on HRT >2 years, progesterone >1 year, has maximized HRT breast growth, interested in referral to Plastics to discuss top surgery.

## 2023-01-05 NOTE — TELEPHONE ENCOUNTER
[place]-Rectal referral:    Andreea Hwang Christie R Hi Christie!      It looks like our Colon/Rectal referral specialist left a voice message & sent a my chart message on 12/6/22 and 12/9/22 left another voice message and sent a letter to the patient.     Thanks!   Andreea MARIN [facility] Referral Dept           Previous Messages     ----- Message -----   From: Mary Kay Bagley   Sent: 1/4/2023   3:47 PM CST   To: Adult Referral Specialist Team   Subject: referral                                         Hello!     Can you please see colorectal referral and let me know status for my provider? Thank you so much!     Mary Kay [facility]

## 2023-01-13 ENCOUNTER — MYC MEDICAL ADVICE (OUTPATIENT)
Dept: FAMILY MEDICINE | Facility: CLINIC | Age: 28
End: 2023-01-13
Payer: COMMERCIAL

## 2023-01-13 ENCOUNTER — MYC MEDICAL ADVICE (OUTPATIENT)
Dept: FAMILY MEDICINE | Facility: CLINIC | Age: 28
End: 2023-01-13

## 2023-01-13 DIAGNOSIS — K60.2 ANAL FISSURE: Primary | ICD-10-CM

## 2023-01-13 DIAGNOSIS — F90.9 ATTENTION DEFICIT HYPERACTIVITY DISORDER (ADHD), UNSPECIFIED ADHD TYPE: ICD-10-CM

## 2023-01-13 RX ORDER — DEXTROAMPHETAMINE SACCHARATE, AMPHETAMINE ASPARTATE, DEXTROAMPHETAMINE SULFATE AND AMPHETAMINE SULFATE 5; 5; 5; 5 MG/1; MG/1; MG/1; MG/1
10 TABLET ORAL
Qty: 15 TABLET | Refills: 0 | Status: SHIPPED | OUTPATIENT
Start: 2023-01-13 | End: 2023-05-01

## 2023-01-13 RX ORDER — DEXTROAMPHETAMINE SACCHARATE, AMPHETAMINE ASPARTATE MONOHYDRATE, DEXTROAMPHETAMINE SULFATE AND AMPHETAMINE SULFATE 7.5; 7.5; 7.5; 7.5 MG/1; MG/1; MG/1; MG/1
30 CAPSULE, EXTENDED RELEASE ORAL DAILY
Qty: 30 CAPSULE | Refills: 0 | Status: SHIPPED | OUTPATIENT
Start: 2023-01-13 | End: 2023-03-07 | Stop reason: DRUGHIGH

## 2023-01-13 NOTE — TELEPHONE ENCOUNTER

## 2023-01-16 ENCOUNTER — TELEPHONE (OUTPATIENT)
Dept: PLASTIC SURGERY | Facility: CLINIC | Age: 28
End: 2023-01-16

## 2023-01-16 DIAGNOSIS — F90.9 ATTENTION DEFICIT HYPERACTIVITY DISORDER (ADHD), UNSPECIFIED ADHD TYPE: ICD-10-CM

## 2023-01-16 RX ORDER — DEXTROAMPHETAMINE SULFATE, DEXTROAMPHETAMINE SACCHARATE, AMPHETAMINE SULFATE AND AMPHETAMINE ASPARTATE 7.5; 7.5; 7.5; 7.5 MG/1; MG/1; MG/1; MG/1
CAPSULE, EXTENDED RELEASE ORAL
Qty: 30 CAPSULE | Refills: 0 | OUTPATIENT
Start: 2023-01-16

## 2023-01-16 RX ORDER — DEXTROAMPHETAMINE SACCHARATE, AMPHETAMINE ASPARTATE, DEXTROAMPHETAMINE SULFATE AND AMPHETAMINE SULFATE 5; 5; 5; 5 MG/1; MG/1; MG/1; MG/1
TABLET ORAL
Qty: 15 TABLET | Refills: 0 | OUTPATIENT
Start: 2023-01-16

## 2023-01-16 NOTE — CONFIDENTIAL NOTE
[facility] :  Care Coordination Note     SITUATION   Ilan Bazzi (she/her) is a 27 year old adult who is receiving support for:  Care Team  .    BACKGROUND     Pt is scheduled for a gender affirming breast augmentation with Dr. Harrell on 3/17/23.     ASSESSMENT     Surgery              CGC Assessment  [facility] ([facility] Enrollment: Enrolled  Patient has a therapist: Yes  Therapist's phone number: Ly Start  Letter of support #1: Requested  Letter of support #2: Requested  Surgery being considered: Yes  Augmentation: Yes  Hormones at least 12mo: Yes    Pt reports:   No nicotine  HRT 4 years  FFS - June 2021  Hair Transplant surgery - Aug. 2022        PLAN          Nursing Interventions:       Follow-up plan:  1. Obtain LOS for top surgery.        Kari Schwartz

## 2023-01-24 RX ORDER — DILTIAZEM HYDROCHLORIDE 60 MG/1
60 CAPSULE, EXTENDED RELEASE ORAL 2 TIMES DAILY
Qty: 60 CAPSULE | Refills: 1 | Status: SHIPPED | OUTPATIENT
Start: 2023-01-24 | End: 2023-05-01

## 2023-02-28 ENCOUNTER — MYC MEDICAL ADVICE (OUTPATIENT)
Dept: SURGERY | Facility: CLINIC | Age: 28
End: 2023-02-28
Payer: COMMERCIAL

## 2023-03-05 ENCOUNTER — MYC MEDICAL ADVICE (OUTPATIENT)
Dept: FAMILY MEDICINE | Facility: CLINIC | Age: 28
End: 2023-03-05
Payer: COMMERCIAL

## 2023-03-05 DIAGNOSIS — F90.9 ATTENTION DEFICIT HYPERACTIVITY DISORDER (ADHD), UNSPECIFIED ADHD TYPE: ICD-10-CM

## 2023-03-06 DIAGNOSIS — F90.9 ATTENTION DEFICIT HYPERACTIVITY DISORDER (ADHD), UNSPECIFIED ADHD TYPE: ICD-10-CM

## 2023-03-06 DIAGNOSIS — F64.0 TRANSSEXUALISM: ICD-10-CM

## 2023-03-06 NOTE — TELEPHONE ENCOUNTER
"Patient last seen 7/8/2022, I let them know they need to schedule an appointment.    Request for medication refill:  amphetamine-dextroamphetamine (ADDERALL XR) 20 MG 24 hr capsule  Providers if patient needs an appointment and you are willing to give a one month supply please refill for one month and  send a letter/MyChart using \".SMILLIMITEDREFILL\" .smillimited and route chart to \"P [facility] \" (Giving one month refill in non controlled medications is strongly recommended before denial)    If refill has been denied, meaning absolutely no refills without visit, please complete the smart phrase \".smirxrefuse\" and route it to the \"P SMI MED REFILLS\"  pool to inform the patient and the pharmacy.    Janis Calderón RN            "

## 2023-03-07 ENCOUNTER — PRE VISIT (OUTPATIENT)
Dept: SURGERY | Facility: CLINIC | Age: 28
End: 2023-03-07

## 2023-03-07 RX ORDER — DEXTROAMPHETAMINE SULFATE, DEXTROAMPHETAMINE SACCHARATE, AMPHETAMINE SULFATE AND AMPHETAMINE ASPARTATE 5; 5; 5; 5 MG/1; MG/1; MG/1; MG/1
CAPSULE, EXTENDED RELEASE ORAL
Qty: 60 CAPSULE | Refills: 0 | OUTPATIENT
Start: 2023-03-07

## 2023-03-07 RX ORDER — DEXTROAMPHETAMINE SACCHARATE, AMPHETAMINE ASPARTATE MONOHYDRATE, DEXTROAMPHETAMINE SULFATE AND AMPHETAMINE SULFATE 5; 5; 5; 5 MG/1; MG/1; MG/1; MG/1
40 CAPSULE, EXTENDED RELEASE ORAL DAILY
Qty: 60 CAPSULE | Refills: 0 | Status: SHIPPED | OUTPATIENT
Start: 2023-03-07 | End: 2023-04-07

## 2023-03-07 RX ORDER — FINASTERIDE 1 MG/1
1 TABLET, FILM COATED ORAL EVERY EVENING
Qty: 90 TABLET | Refills: 2 | Status: SHIPPED | OUTPATIENT
Start: 2023-03-07 | End: 2024-01-03

## 2023-03-08 ENCOUNTER — OFFICE VISIT (OUTPATIENT)
Dept: SURGERY | Facility: CLINIC | Age: 28
End: 2023-03-08
Payer: COMMERCIAL

## 2023-03-08 VITALS — SYSTOLIC BLOOD PRESSURE: 118 MMHG | HEART RATE: 115 BPM | OXYGEN SATURATION: 98 % | DIASTOLIC BLOOD PRESSURE: 75 MMHG

## 2023-03-08 DIAGNOSIS — K60.2 ANAL FISSURE: Primary | ICD-10-CM

## 2023-03-08 PROCEDURE — 99213 OFFICE O/P EST LOW 20 MIN: CPT

## 2023-03-08 ASSESSMENT — PAIN SCALES - GENERAL: PAINLEVEL: NO PAIN (0)

## 2023-03-08 NOTE — PROGRESS NOTES
Colon and Rectal Surgery Consult Clinic Note    Date: 3/8/2023     Referring provider:  Sofy Hudson, DO   [address] [address]     RE: Ilan Bazzi  : 1995  ADITHYA: 3/8/2023    Ilan Bazzi is a very pleasant 27 year old adult with an anal fissure.    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 This was minimally helpful. Did not get headaches with it. Was prescribed topical diltiazem but reports this was too expensive, so did not get it. Did not start a fiber supplement or stool softener during that time. Pain persists- describes it like a tearing sensation during bowel movements accompanied with blood. Bowel movements are formed.    No prior colonoscopy. No family history of colorectal cancer.    Physical Examination: Exam was chaperoned by Slim Henao, EMT-P   /75 (BP Location: Left arm, Patient Position: Sitting, Cuff Size: Adult Regular)   Pulse 115   SpO2 98%   General: alert, oriented, in no acute distress, sitting comfortably  HEENT: moist mucous membranes  Perianal external examination:  Perianal skin: Intact with no excoriation or lichenification.  Lesions: No evidence of an external lesion, nodularity, or induration in the perianal region.  Eversion of buttocks: There was evidence of an anal fissure. Details: posterior midline anal fissure.  Skin tags or external hemorrhoids: None.    Digital rectal examination: Was deferred in order not to cause further trauma    Anoscopy: Was deferred in order not to cause further trauma    Assessment/Plan: Ilan Bazzi is a 27 year old adult a chronic anal fissure. Has already tried nitroglycerin, but did not have a bowel regimen with this. Topical diltiazem is too expensive. We discussed treatment options. Recommended continuing topical nitroglycerin for another 4-6 weeks. She thinks she has enough at home and will reach out for a refill if needed. Also stressed the importance of  maintaining soft bowel movements, either with a daily fiber supplement or Miralax. Will likely need a bowel regimen for long term to prevent recurrence. We also discussed the possibility for Botox injections if pain persists. She will reach out via MyChart if the pain persists and if she is interested in proceeding with Botox. Will need prior authorization, and then schedule with Urmila Khan NP for clinic Botox injection (she prefers clinic over OR). Patient's questions were answered to her stated satisfaction and she is in agreement with this plan.     Medical history:  Past Medical History:   Diagnosis Date     Anxiety      Concussion without loss of consciousness 2001     Left varicocele 7/20/2021       Surgical history:  Past Surgical History:   Procedure Laterality Date     ENT SURGERY      wisdom teeth extraction     LE FORT ONE N/A 6/8/2017    Procedure: LE FORT ONE;  LEF FORT ONE OSTEOTOMY;  Surgeon: Murray Tran DDS;  Location: [facility] OR     RECONSTRUCT FOREHEAD N/A 3/9/2021    Procedure: 1. Anterior frontal sinus osteotomy and setback,  2. Bony recontouring of forehead, fronto-orbital bar,  3. Bony recontouring of naso-frontal junction,  4. Bony recontouring and ostectomy of orbits, bilateral,  5. Bony recontouring of bilateral anterior temporal lines,  7. Elevation of bi-pedicled pericranial flap, with release and decompression of bilateral supra-orbital neurovascular b     RECONSTRUCT MANDIBLE N/A 3/9/2021    Procedure: 8. Ostectomy and contouring of mandible and chin,  9. Thyroid reduction chondroplasty,;  Surgeon: Shayla Cardenas MD;  Location: [facility] OR     SUSPEND BROW Bilateral 3/9/2021    Procedure: 6. Open repair of bilateral brow ptosis with forehead Endotine,;  Surgeon: Shayla Cardenas MD;  Location: [facility] OR       Problem list:  Patient Active Problem List    Diagnosis Date Noted     Recurrent major depressive disorder, in partial remission (H) 05/16/2022     Priority: Medium      Generalized anxiety disorder 05/16/2022     Priority: Medium     Drug-induced erectile dysfunction 05/16/2022     Priority: Medium     Restless legs syndrome (RLS) 08/12/2021     Priority: Medium     Low ferritin August 2021, improved w/ supplementation        ADHD (attention deficit hyperactivity disorder) 04/27/2021     Priority: Medium     Depression 04/27/2021     Priority: Medium     PTSD (post-traumatic stress disorder) 04/27/2021     Priority: Medium     Gender dysphoria in adolescent and adult 12/09/2020     Priority: Medium     Feminizing HRT since April 2019       Mild anxiety 10/16/2020     Priority: Medium     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     Priority: Medium     Panic disorder with agoraphobia 09/01/2009     Priority: Medium       Medications:  Current Outpatient Medications   Medication Sig Dispense Refill     amphetamine-dextroamphetamine (ADDERALL XR) 20 MG 24 hr capsule Take 2 capsules (40 mg) by mouth daily 60 capsule 0     amphetamine-dextroamphetamine (ADDERALL) 20 MG tablet Take 0.5 tablets (10 mg) by mouth daily at 2 pm 15 tablet 0     diltiazem ER (CARDIZEM SR) 60 MG 12 hr capsule Take 1 capsule (60 mg) by mouth 2 times daily 60 capsule 1     estradiol (ESTRACE) 2 MG tablet Take 2 tablets (4 mg) by mouth 2 times daily 120 tablet 4     Ferrous Gluconate 240 (27 Fe) MG TABS Take by mouth daily       finasteride (PROPECIA) 1 MG tablet Take 1 tablet (1 mg) by mouth every evening 90 tablet 2     Multiple Vitamins-Minerals (MULTIVITAL PO) Take by mouth every morning        nifedipine 0.2% in white petrolatum 0.2 % OINT ointment Apply topically 2 times daily 100 g 1     nitroGLYcerin 0.2% ointment Apply pea sized amount to affected anal skin twice daily. 30 g 1     venlafaxine (EFFEXOR XR) 75 MG 24 hr capsule Take 1 capsule (75 mg) by mouth daily 30 capsule 11       Allergies:  Allergies   Allergen Reactions     Wellbutrin [Bupropion] Swelling     Pruritus and edema to feet       Family  "history:  Family History   Problem Relation Age of Onset     Depression Mother      Macular Degeneration Father      Other - See Comments Father 66        \"total autonomic failure\" diagnosed at [place]     Alzheimer Disease Maternal Grandmother      Myocardial Infarction Maternal Grandfather      Coronary Artery Disease Paternal Grandmother      Myocardial Infarction Paternal Grandmother 70     Glaucoma No family hx of        Social history:  Social History     Tobacco Use     Smoking status: Never     Smokeless tobacco: Never   Substance Use Topics     Alcohol use: Not Currently    Marital status: single.  Occupation: sets up escape/puzzle rooms.    Nursing Notes:   Slim Henao, EMT  3/8/2023  1:29 PM  Signed  Chief Complaint   Patient presents with     Consult       Vitals:    03/08/23 1327   BP: 118/75   BP Location: Left arm   Patient Position: Sitting   Cuff Size: Adult Regular   Pulse: 115   SpO2: 98%       There is no height or weight on file to calculate BMI.    Slim Henao EMT-P         30 minutes spent on the date of encounter performing chart review, history and exam, documentation and further activities as noted above.     ----------------------------------------------------  Susana Wagner PA-C  Colon and Rectal Surgery   [facility]    "

## 2023-03-08 NOTE — LETTER
3/8/2023       RE: Ilan Bazzi  [address]  [place] [address]     Dear Colleague,    Thank you for referring your patient, Ilan Bazzi, to the [facility] COLON AND RECTAL SURGERY CLINIC [place] [facility]. Please see a copy of my visit note below.    Colon and Rectal Surgery Consult Clinic Note    Date: 3/8/2023     Referring provider:  Sofy Hudson,    [address]  [address] [address]     RE: Ilan Bazzi  : 1995  ADITHYA: 3/8/2023    Ilan Bazzi is a very pleasant 27 year old adult with an anal fissure.    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 This was minimally helpful. Did not get headaches with it. Was prescribed topical diltiazem but reports this was too expensive, so did not get it. Did not start a fiber supplement or stool softener during that time. Pain persists- describes it like a tearing sensation during bowel movements accompanied with blood. Bowel movements are formed.    No prior colonoscopy. No family history of colorectal cancer.    Physical Examination: Exam was chaperoned by Slim Henao EMT-P   /75 (BP Location: Left arm, Patient Position: Sitting, Cuff Size: Adult Regular)   Pulse 115   SpO2 98%   General: alert, oriented, in no acute distress, sitting comfortably  HEENT: moist mucous membranes  Perianal external examination:  Perianal skin: Intact with no excoriation or lichenification.  Lesions: No evidence of an external lesion, nodularity, or induration in the perianal region.  Eversion of buttocks: There was evidence of an anal fissure. Details: posterior midline anal fissure.  Skin tags or external hemorrhoids: None.    Digital rectal examination: Was deferred in order not to cause further trauma    Anoscopy: Was deferred in order not to cause further trauma    Assessment/Plan: Ilan Bazzi is a 27 year old adult a chronic anal fissure.  Has already tried nitroglycerin, but did not have a bowel regimen with this. Topical diltiazem is too expensive. We discussed treatment options. Recommended continuing topical nitroglycerin for another 4-6 weeks. She thinks she has enough at home and will reach out for a refill if needed. Also stressed the importance of maintaining soft bowel movements, either with a daily fiber supplement or Miralax. Will likely need a bowel regimen for long term to prevent recurrence. We also discussed the possibility for Botox injections if pain persists. She will reach out via MyChart if the pain persists and if she is interested in proceeding with Botox. Will need prior authorization, and then schedule with Urmila Khan NP for clinic Botox injection (she prefers clinic over OR). Patient's questions were answered to her stated satisfaction and she is in agreement with this plan.     Medical history:  Past Medical History:   Diagnosis Date     Anxiety      Concussion without loss of consciousness 2001     Left varicocele 7/20/2021       Surgical history:  Past Surgical History:   Procedure Laterality Date     ENT SURGERY      wisdom teeth extraction     LE FORT ONE N/A 6/8/2017    Procedure: LE FORT ONE;  LEF FORT ONE OSTEOTOMY;  Surgeon: Murray Tran DDS;  Location: [facility] OR     RECONSTRUCT FOREHEAD N/A 3/9/2021    Procedure: 1. Anterior frontal sinus osteotomy and setback,  2. Bony recontouring of forehead, fronto-orbital bar,  3. Bony recontouring of naso-frontal junction,  4. Bony recontouring and ostectomy of orbits, bilateral,  5. Bony recontouring of bilateral anterior temporal lines,  7. Elevation of bi-pedicled pericranial flap, with release and decompression of bilateral supra-orbital neurovascular b     RECONSTRUCT MANDIBLE N/A 3/9/2021    Procedure: 8. Ostectomy and contouring of mandible and chin,  9. Thyroid reduction chondroplasty,;  Surgeon: Shayla Cardenas MD;  [facility] OR     SUSPEND BROW  Bilateral 3/9/2021    Procedure: 6. Open repair of bilateral brow ptosis with forehead Endotine,;  Surgeon: Shayla Cardenas MD;  Location[facility] OR       Problem list:  Patient Active Problem List    Diagnosis Date Noted     Recurrent major depressive disorder, in partial remission (H) 05/16/2022     Priority: Medium     Generalized anxiety disorder 05/16/2022     Priority: Medium     Drug-induced erectile dysfunction 05/16/2022     Priority: Medium     Restless legs syndrome (RLS) 08/12/2021     Priority: Medium     Low ferritin August 2021, improved w/ supplementation        ADHD (attention deficit hyperactivity disorder) 04/27/2021     Priority: Medium     Depression 04/27/2021     Priority: Medium     PTSD (post-traumatic stress disorder) 04/27/2021     Priority: Medium     Gender dysphoria in adolescent and adult 12/09/2020     Priority: Medium     Feminizing HRT since April 2019       Mild anxiety 10/16/2020     Priority: Medium     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     Priority: Medium     Panic disorder with agoraphobia 09/01/2009     Priority: Medium       Medications:  Current Outpatient Medications   Medication Sig Dispense Refill     amphetamine-dextroamphetamine (ADDERALL XR) 20 MG 24 hr capsule Take 2 capsules (40 mg) by mouth daily 60 capsule 0     amphetamine-dextroamphetamine (ADDERALL) 20 MG tablet Take 0.5 tablets (10 mg) by mouth daily at 2 pm 15 tablet 0     diltiazem ER (CARDIZEM SR) 60 MG 12 hr capsule Take 1 capsule (60 mg) by mouth 2 times daily 60 capsule 1     estradiol (ESTRACE) 2 MG tablet Take 2 tablets (4 mg) by mouth 2 times daily 120 tablet 4     Ferrous Gluconate 240 (27 Fe) MG TABS Take by mouth daily       finasteride (PROPECIA) 1 MG tablet Take 1 tablet (1 mg) by mouth every evening 90 tablet 2     Multiple Vitamins-Minerals (MULTIVITAL PO) Take by mouth every morning        nifedipine 0.2% in white petrolatum 0.2 % OINT ointment Apply topically 2 times daily 100 g 1     nitroGLYcerin  "0.2% ointment Apply pea sized amount to affected anal skin twice daily. 30 g 1     venlafaxine (EFFEXOR XR) 75 MG 24 hr capsule Take 1 capsule (75 mg) by mouth daily 30 capsule 11       Allergies:  Allergies   Allergen Reactions     Wellbutrin [Bupropion] Swelling     Pruritus and edema to feet       Family history:  Family History   Problem Relation Age of Onset     Depression Mother      Macular Degeneration Father      Other - See Comments Father 66        \"total autonomic failure\" diagnosed at [place]     Alzheimer Disease Maternal Grandmother      Myocardial Infarction Maternal Grandfather      Coronary Artery Disease Paternal Grandmother      Myocardial Infarction Paternal Grandmother 70     Glaucoma No family hx of        Social history:  Social History     Tobacco Use     Smoking status: Never     Smokeless tobacco: Never   Substance Use Topics     Alcohol use: Not Currently    Marital status: single.  Occupation: sets up escape/puzzle rooms.    Nursing Notes:   Slim Henao, EMT  3/8/2023  1:29 PM  Signed  Chief Complaint   Patient presents with     Consult       Vitals:    03/08/23 1327   BP: 118/75   BP Location: Left arm   Patient Position: Sitting   Cuff Size: Adult Regular   Pulse: 115   SpO2: 98%       There is no height or weight on file to calculate BMI.    Slim Henao EMT-P         30 minutes spent on the date of encounter performing chart review, history and exam, documentation and further activities as noted above.     ----------------------------------------------------  Susana Wagner PA-C  Colon and Rectal Surgery   [facility]  "

## 2023-03-08 NOTE — NURSING NOTE
Chief Complaint   Patient presents with     Consult       Vitals:    03/08/23 1327   BP: 118/75   BP Location: Left arm   Patient Position: Sitting   Cuff Size: Adult Regular   Pulse: 115   SpO2: 98%       There is no height or weight on file to calculate BMI.    Slim Henao EMT-P

## 2023-03-17 ENCOUNTER — OFFICE VISIT (OUTPATIENT)
Dept: PLASTIC SURGERY | Facility: AMBULATORY SURGERY CENTER | Age: 28
End: 2023-03-17
Payer: COMMERCIAL

## 2023-03-17 VITALS
HEIGHT: 71 IN | DIASTOLIC BLOOD PRESSURE: 70 MMHG | SYSTOLIC BLOOD PRESSURE: 110 MMHG | WEIGHT: 161 LBS | BODY MASS INDEX: 22.54 KG/M2

## 2023-03-17 DIAGNOSIS — Z98.82 S/P AUGMENTATION MAMMAPLASTY: Primary | ICD-10-CM

## 2023-03-17 DIAGNOSIS — F64.9 GENDER DYSPHORIA: ICD-10-CM

## 2023-03-17 PROCEDURE — 99215 OFFICE O/P EST HI 40 MIN: CPT | Performed by: PLASTIC SURGERY

## 2023-03-17 NOTE — LETTER
3/17/2023         RE: Ilan Bazzi  [address]  [place] [address]        Dear Colleague,    Thank you for referring your patient, Ilan Bazzi, to the [facility] [place]. Please see a copy of my visit note below.    Chief complaint:  Gender dysphoria, consultation for top surgery     History of present illness:  This is a 27 year old trans female patient who comes today for consultation regarding top surgery.  Patient's pronouns she/her/hers.  Patient's name is Ilan.  At this time the letter of support is  ready.  Currently Ilan is on estrogen treatment.  She has been receiving estrogen for the last 4 years.   Patient has been dealing with gender dysphoria for the last 15 years.     Past medical history:  Past Medical History:   Diagnosis Date     Anxiety      Concussion without loss of consciousness 2001     Left varicocele 7/20/2021       Gender dysphoria     Past surgical history:  Facial feminization, heart transplant, orthognathic jaw surgery     Allergies:  Wellbutrin     Medications:    Current Outpatient Medications:      amphetamine-dextroamphetamine (ADDERALL XR) 20 MG 24 hr capsule, Take 2 capsules (40 mg) by mouth daily, Disp: 60 capsule, Rfl: 0     amphetamine-dextroamphetamine (ADDERALL) 20 MG tablet, Take 0.5 tablets (10 mg) by mouth daily at 2 pm, Disp: 15 tablet, Rfl: 0     diltiazem ER (CARDIZEM SR) 60 MG 12 hr capsule, Take 1 capsule (60 mg) by mouth 2 times daily, Disp: 60 capsule, Rfl: 1     estradiol (ESTRACE) 2 MG tablet, Take 2 tablets (4 mg) by mouth 2 times daily, Disp: 120 tablet, Rfl: 4     Ferrous Gluconate 240 (27 Fe) MG TABS, Take by mouth daily, Disp: , Rfl:      finasteride (PROPECIA) 1 MG tablet, Take 1 tablet (1 mg) by mouth every evening, Disp: 90 tablet, Rfl: 2     Multiple Vitamins-Minerals (MULTIVITAL PO), Take by mouth every morning , Disp: , Rfl:      nifedipine 0.2% in white petrolatum 0.2 % OINT ointment, Apply topically 2  "times daily, Disp: 100 g, Rfl: 1     nitroGLYcerin 0.2% ointment, Apply pea sized amount to affected anal skin twice daily., Disp: 30 g, Rfl: 1     venlafaxine (EFFEXOR XR) 75 MG 24 hr capsule, Take 1 capsule (75 mg) by mouth daily, Disp: 30 capsule, Rfl: 11     Family history:  Noncontributory     Social History:  Denies tobacco, drinks alcohol very rarely.     Review of systems:  General ROS: No complaints or constitutional symptoms  Skin: No complaints or symptoms   Hematologic/Lymphatic: No symptoms or complaints  Psychiatric: Patient presents with gender dysphoria  Endocrine: No excessive fatigue, no hypermetabolic symptoms reported  Respiratory ROS: No cough, shortness of breath, or wheezing  Cardiovascular ROS: No chest pain or dyspnea on exertion  Breast ROS: Denies nipple discharge, denies palpable breast masses, denies peau d'orange.  Gastrointestinal ROS: No abdominal pain, nausea, diarrhea, or constipation  Musculoskeletal ROS: No recent injuries reported  Neurological ROS: No focal neurologic defects reported.       Physical exam:     /70 (BP Location: Right arm, Patient Position: Sitting)   Ht 1.803 m (5' 11\")   Wt 73 kg (161 lb)   BMI 22.45 kg/m    General: Alert, cooperative, appears stated age   Skin: Skin color, texture, turgor normal, no rashes or lesions   Lymphatic: No obvious adenopathy, no swelling   Eyes: No scleral icterus, pupils equal  HENT: No traumatic injury to the head or face, no gross abnormalities  Lungs: Normal respiratory effort, breath sounds equal bilaterally  Heart: Regular rate and rhythm  Breasts:  Bilateral glandular hypertrophy, with grade 1 ptosis.  No nipple inversion, no nipple discharge, no palpable breast masses.    Left nipple areolar complex is slightly higher compared to the right one.  On the right breast sternal notch to nipple distance is 20 cm, nipple to inframammary fold is 6 cm, and breast diameter is 12 cm.  Of the left breast sternal notch to nipple " "distance is 19 cm, nipple to inframammary fold distance is 7 cm, and breast diameter is 11 cm.  Pinch thickness test on the skin of both breasts is 3 cm.  No palpable axillary lymphadenopathies bilaterally.  Abdomen: Soft, non-distended and non-tender to palpation  Neurologic: Grossly intact                                  Assessment:     27 year old trans female with history of gender dysphoria.     PLAN:      I believe that Ilan is a good candidate for gender affirming top surgery with bilateral prepectoral augmentation mammoplasties (her skin pinch test thickness is greater than 2 cm), via inframammary fold approach, with lowering of the inframammary fold at least 2 cm down from its current position (soft tissue rearrangement).  Furthermore, she will need obliteration of the current inframammary fold which is at least 2 cm higher from where is supposed to be.  Finally, she will need scoring of her bilateral glands.  Based upon her breast diameter I will bring to the operating room full profile, cohesive, breast implants with 415 cc, 450 ml and 485 cc.  I will utilize corresponding sizers as well.     \"According to [place] case law and [facility] standards of care, with an appropriate letter of support from a mental health provider, top surgery/mastectomy is medically necessary for the treatment of gender dysphoria.\"     I discussed with Ilan the risks of surgery and they include but are not limited to scarring, large scar in the inframammary fold, keloid formation, infection requiring explantation and secondary augmentation at least 6 to 12 months after explantation, bleeding, hematoma, seroma, contour deformities, lack of sensation on the chest and nipple areolar complex, capsular contracture, implant malposition, need for further surgeries.     Patient did acknowledge all of these risks and has agreed to proceed.     We will schedule her for surgery: bilateral prepectoral augmentation mammoplasties with lowering " of bilateral inframammary folds as a soft tissue rearrangement.     Time spent with the patient 45 minutes.      Gareth Harrell MD , FACS   Diplomate American Board of Plastic Surgery  Diplomate American Board of Surgery  Adj. Assistant Professor of Surgery  Division of Plastic & Reconstructive Surgery   [facility]  Office: [phone]   3/17/2023 at 11:12 AM         Again, thank you for allowing me to participate in the care of your patient.        Sincerely,        Gareth Harrell MD

## 2023-03-17 NOTE — PROGRESS NOTES
Chief complaint:  Gender dysphoria, consultation for top surgery     History of present illness:  This is a 27 year old trans female patient who comes today for consultation regarding top surgery.  Patient's pronouns she/her/hers.  Patient's name is Ilan.  At this time the letter of support is  ready.  Currently Ilan is on estrogen treatment.  She has been receiving estrogen for the last 4 years.   Patient has been dealing with gender dysphoria for the last 15 years.     Past medical history:  Past Medical History:   Diagnosis Date     Anxiety      Concussion without loss of consciousness 2001     Left varicocele 7/20/2021       Gender dysphoria     Past surgical history:  Facial feminization, heart transplant, orthognathic jaw surgery     Allergies:  Wellbutrin     Medications:    Current Outpatient Medications:      amphetamine-dextroamphetamine (ADDERALL XR) 20 MG 24 hr capsule, Take 2 capsules (40 mg) by mouth daily, Disp: 60 capsule, Rfl: 0     amphetamine-dextroamphetamine (ADDERALL) 20 MG tablet, Take 0.5 tablets (10 mg) by mouth daily at 2 pm, Disp: 15 tablet, Rfl: 0     diltiazem ER (CARDIZEM SR) 60 MG 12 hr capsule, Take 1 capsule (60 mg) by mouth 2 times daily, Disp: 60 capsule, Rfl: 1     estradiol (ESTRACE) 2 MG tablet, Take 2 tablets (4 mg) by mouth 2 times daily, Disp: 120 tablet, Rfl: 4     Ferrous Gluconate 240 (27 Fe) MG TABS, Take by mouth daily, Disp: , Rfl:      finasteride (PROPECIA) 1 MG tablet, Take 1 tablet (1 mg) by mouth every evening, Disp: 90 tablet, Rfl: 2     Multiple Vitamins-Minerals (MULTIVITAL PO), Take by mouth every morning , Disp: , Rfl:      nifedipine 0.2% in white petrolatum 0.2 % OINT ointment, Apply topically 2 times daily, Disp: 100 g, Rfl: 1     nitroGLYcerin 0.2% ointment, Apply pea sized amount to affected anal skin twice daily., Disp: 30 g, Rfl: 1     venlafaxine (EFFEXOR XR) 75 MG 24 hr capsule, Take 1 capsule (75 mg) by mouth daily, Disp: 30 capsule, Rfl: 11    "  Family history:  Noncontributory     Social History:  Denies tobacco, drinks alcohol very rarely.     Review of systems:  General ROS: No complaints or constitutional symptoms  Skin: No complaints or symptoms   Hematologic/Lymphatic: No symptoms or complaints  Psychiatric: Patient presents with gender dysphoria  Endocrine: No excessive fatigue, no hypermetabolic symptoms reported  Respiratory ROS: No cough, shortness of breath, or wheezing  Cardiovascular ROS: No chest pain or dyspnea on exertion  Breast ROS: Denies nipple discharge, denies palpable breast masses, denies peau d'orange.  Gastrointestinal ROS: No abdominal pain, nausea, diarrhea, or constipation  Musculoskeletal ROS: No recent injuries reported  Neurological ROS: No focal neurologic defects reported.       Physical exam:     /70 (BP Location: Right arm, Patient Position: Sitting)   Ht 1.803 m (5' 11\")   Wt 73 kg (161 lb)   BMI 22.45 kg/m    General: Alert, cooperative, appears stated age   Skin: Skin color, texture, turgor normal, no rashes or lesions   Lymphatic: No obvious adenopathy, no swelling   Eyes: No scleral icterus, pupils equal  HENT: No traumatic injury to the head or face, no gross abnormalities  Lungs: Normal respiratory effort, breath sounds equal bilaterally  Heart: Regular rate and rhythm  Breasts:  Bilateral glandular hypertrophy, with grade 1 ptosis.  No nipple inversion, no nipple discharge, no palpable breast masses.    Left nipple areolar complex is slightly higher compared to the right one.  On the right breast sternal notch to nipple distance is 20 cm, nipple to inframammary fold is 6 cm, and breast diameter is 12 cm.  Of the left breast sternal notch to nipple distance is 19 cm, nipple to inframammary fold distance is 7 cm, and breast diameter is 11 cm.  Pinch thickness test on the skin of both breasts is 3 cm.  No palpable axillary lymphadenopathies bilaterally.  Abdomen: Soft, non-distended and non-tender to " "palpation  Neurologic: Grossly intact                                  Assessment:     27 year old trans female with history of gender dysphoria.     PLAN:      I believe that Ilan is a good candidate for gender affirming top surgery with bilateral prepectoral augmentation mammoplasties (her skin pinch test thickness is greater than 2 cm), via inframammary fold approach, with lowering of the inframammary fold at least 2 cm down from its current position (soft tissue rearrangement).  Furthermore, she will need obliteration of the current inframammary fold which is at least 2 cm higher from where is supposed to be.  Finally, she will need scoring of her bilateral glands.  Based upon her breast diameter I will bring to the operating room full profile, cohesive, breast implants with 415 cc, 450 ml and 485 cc.  I will utilize corresponding sizers as well.     \"According to [place] case law and [facility] standards of care, with an appropriate letter of support from a mental health provider, top surgery/mastectomy is medically necessary for the treatment of gender dysphoria.\"     I discussed with Ilan the risks of surgery and they include but are not limited to scarring, large scar in the inframammary fold, keloid formation, infection requiring explantation and secondary augmentation at least 6 to 12 months after explantation, bleeding, hematoma, seroma, contour deformities, lack of sensation on the chest and nipple areolar complex, capsular contracture, implant malposition, need for further surgeries.     Patient did acknowledge all of these risks and has agreed to proceed.     We will schedule her for surgery: bilateral prepectoral augmentation mammoplasties with lowering of bilateral inframammary folds as a soft tissue rearrangement.     Time spent with the patient 45 minutes.      Gareth Harrell MD , FACS   Diplomate American Board of Plastic Surgery  Diplomate American Board of Surgery  Adj.  of " Surgery  Division of Plastic & Reconstructive Surgery   [facility]  Office: [phone]   3/17/2023 at 11:12 AM

## 2023-03-17 NOTE — NURSING NOTE
Patient here today for gender affirming breast augmentation consultation. The patient has a letter of support and will upload it via [employer].    Vaishali Key RN on 3/17/2023 at 11:10 AM

## 2023-04-05 ENCOUNTER — DOCUMENTATION ONLY (OUTPATIENT)
Dept: PLASTIC SURGERY | Facility: CLINIC | Age: 28
End: 2023-04-05
Payer: COMMERCIAL

## 2023-04-05 NOTE — PROGRESS NOTES
[facility] :  Care Coordination Note     SITUATION   Ilan Bazzi is a 27 year old adult who is receiving support for:  Care Team  .    BACKGROUND     Reviewed LOS. It meets criteria. Pt will need a diagnostic assessment. Sent message requesting DA.     ASSESSMENT     Surgery              [facility] Assessment  [facility] ([facility] Enrollment: Enrolled  Patient has a therapist: Yes  Name of therapist: Dennis Hill  Letter of support #1: Received  Letter #1 Date: 02/09/23  Surgery being considered: Yes  Augmentation: Yes          PLAN          Nursing Interventions:       Follow-up plan:  Pt to upload DA, case request to be placed, surgery to be scheduled.        SHAKIRA COTTO [facility]

## 2023-04-06 ENCOUNTER — MYC MEDICAL ADVICE (OUTPATIENT)
Dept: FAMILY MEDICINE | Facility: CLINIC | Age: 28
End: 2023-04-06
Payer: COMMERCIAL

## 2023-04-06 DIAGNOSIS — F90.9 ATTENTION DEFICIT HYPERACTIVITY DISORDER (ADHD), UNSPECIFIED ADHD TYPE: ICD-10-CM

## 2023-04-07 RX ORDER — DEXTROAMPHETAMINE SACCHARATE, AMPHETAMINE ASPARTATE MONOHYDRATE, DEXTROAMPHETAMINE SULFATE AND AMPHETAMINE SULFATE 5; 5; 5; 5 MG/1; MG/1; MG/1; MG/1
40 CAPSULE, EXTENDED RELEASE ORAL DAILY
Qty: 60 CAPSULE | Refills: 0 | Status: SHIPPED | OUTPATIENT
Start: 2023-04-07 | End: 2023-05-01

## 2023-04-07 NOTE — TELEPHONE ENCOUNTER
"Last seen 7/28/22  Please see the note on the script saying it needs a co sign?    Request for medication refill:  amphetamine-dextroamphetamine (ADDERALL XR) 20 MG 24 hr capsule  Providers if patient needs an appointment and you are willing to give a one month supply please refill for one month and  send a letter/MyChart using \".SMILLIMITEDREFILL\" .smillimited and route chart to \"P [facility] \" (Giving one month refill in non controlled medications is strongly recommended before denial)    If refill has been denied, meaning absolutely no refills without visit, please complete the smart phrase \".smirxrefuse\" and route it to the \"P SMI MED REFILLS\"  pool to inform the patient and the pharmacy.    Janis Calderón RN        "

## 2023-04-17 ENCOUNTER — VIRTUAL VISIT (OUTPATIENT)
Dept: PSYCHOLOGY | Facility: CLINIC | Age: 28
End: 2023-04-17
Payer: COMMERCIAL

## 2023-04-17 DIAGNOSIS — F33.41 RECURRENT MAJOR DEPRESSIVE DISORDER, IN PARTIAL REMISSION (H): ICD-10-CM

## 2023-04-17 DIAGNOSIS — N52.2 DRUG-INDUCED ERECTILE DYSFUNCTION: ICD-10-CM

## 2023-04-17 DIAGNOSIS — F41.1 GENERALIZED ANXIETY DISORDER: Primary | ICD-10-CM

## 2023-04-17 PROCEDURE — 90837 PSYTX W PT 60 MINUTES: CPT | Mod: VID | Performed by: MARRIAGE & FAMILY THERAPIST

## 2023-04-17 NOTE — PROGRESS NOTES
[facility] - Progress Note    Date of Service: 23   Name: Ilan Bazzi  : 1995  Medical Record Number: [id number]  Treating Provider: Eros Coppola  Type of Session: Individual  Present in Session: pt  Session Start and Stop Time: 3557-3727  Number of Minutes:  57    SERVICE MODALITY:  Video Visit:      Provider verified identity through the following two step process.  Patient provided:  Patient is known previously to provider    Telemedicine Visit: The patient's condition can be safely assessed and treated via synchronous audio and visual telemedicine encounter.      Reason for Telemedicine Visit: Services only offered telehealth    Originating Site (Patient Location): Patient's home    Distant Site (Provider Location): [facility]    Consent:  The patient/guardian has verbally consented to: the potential risks and benefits of telemedicine (video visit) versus in person care; bill my insurance or make self-payment for services provided; and responsibility for payment of non-covered services.     Patient would like the video invitation sent by:  My Chart    Mode of Communication:  Video Conference via [facility]    Distant Location (Provider):  Off-site    As the provider I attest to compliance with applicable laws and regulations related to telemedicine.    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  302.85 (F64.1) Gender dysphoria in Adolescents and Adults  Posttransition  ADHD by history   Provisional Diagnostic Hypothesis (Explain R/O, other Provisional Diagnosis, and why alternative Diagnosis that were considered were ruled out): NA      Current Reported Symptoms and Status update:  Changes since last session-still struggling with  "physical touch-had an epiphany  Pt wants to increase boundaries around sexual expression   Pt wants to integrate sexual identity and expression with partners       Progress Toward Treatment Goals:   Satisfactory progress     Therapeutic Interventions/Treatment Strategies:    Area(s) of treatment focus addressed in this session included Symptom Management, Interpersonal Relationship Skills and Sexual Health and Wellness    Had a trauma dissociate response when had an opportunity to have physical connection-there was significant fear and anxiety-  In the past usually has been told that she has done something wrong-sexual confidence is low, expression of sexuality, \"people dont know they are uncomfortable until its too late\" was told to her-internalized this  Message -expressing self sexually is not something that is safe to do  Cuddles, physical touch-started dissociating-that sucked-dont want to do that  In regards to adhd-self sabatoge a lot-will give self reasons why cant do something-tell self-I wish I could but I have adhd and wont stick with it    Explored self confidence and trusting self    Psychotherapist offered support, feedback and validation and reinforced use of skills Treatment modalities used include Motivational Interviewing Dialectical Behavioral Therapy Mindfulness based intervention Attachment based intervention  Sexual Health and Wellness Education Interpersonal Coping Skills: Discussed how the use of intentional \"in the moment\" actions can help reduce distress and Facilitated understanding of  what factors may contribute to symptom relapse and skills plan to manage symptom relapse , Relationship Skills: Discussed strategies to promote healthier understanding of interpersonal relationships and explored challenging unrealistic expectations of self/others and explored comfort with sexual communication  Support, Feedback, Structured Activity, Problem Solving and Education    Patient Response: " "  Patient responded to session by accepting feedback, listening, focusing on goals and accepting support  Possible barriers to participation / learning include: N/A    Current Mental Status Exam:   Appearance:  Appropriate   Eye Contact:  Good   Attitude / Demeanor: Cooperative   Speech      Rate / Production: Normal/ Responsive Talkative      Volume:  Normal  volume  Orientation:  All  Mood:   Anxious  Depressed   Affect:   Appropriate   Thought Content: Clear   Insight:   Good       Plan/Need for Future Services:  Return for therapy in 3 weeks to treat diagnosed problems.    Patient has a current master individualized treatment plan.  See Epic treatment plan for more information.    Referral / Collaboration:  Referral to another professional/service is not indicated at this time..  Emergency Services Needed?  No    Assignment:  Choose your hard-work on systems  Explore challenging self talk and trusting self intentionally BRAVING  Staying in the moment with others  \"what if I just start\" start doing instead of thinking    Interactive Complexity:  There are four specific communication difficulties that complicate the work of the primary psychiatric procedure.  Interactive complexity (+46821) may be reported when at least one of these difficulties is present.    Communication difficulties present during current the psychiatric procedure include:  1. None.      Signature/Title:    Eros Coppola    "

## 2023-04-17 NOTE — NURSING NOTE
Is the patient currently in the state of [place]? YES    Visit mode:VIDEO    If the visit is dropped, the patient can be reconnected by: VIDEO VISIT: Text to cell phone:   Telephone Information:   Mobile [phone]       Will anyone else be joining the visit? No  (If patient encounters technical issues they should call [phone])    How would you like to obtain your AVS? MyChart    Are changes needed to the allergy or medication list? N/A    Rooming Documentation: Questionnaire(s) not done per department protocol    Reason for visit: Video Visit       SAMMY Crabtree

## 2023-04-20 ENCOUNTER — TELEPHONE (OUTPATIENT)
Dept: PLASTIC SURGERY | Facility: AMBULATORY SURGERY CENTER | Age: 28
End: 2023-04-20
Payer: COMMERCIAL

## 2023-04-20 PROBLEM — F64.9 GENDER DYSPHORIA: Status: ACTIVE | Noted: 2023-04-20

## 2023-04-20 NOTE — TELEPHONE ENCOUNTER
Spoke with patient today regarding surgery scheduling     Went over details/instructions.    Surgery Letter sent via [employer]  (Please see LETTERS TAB in chart to retrieve a copy of this letter)

## 2023-04-20 NOTE — LETTER
We've received instruction to get you scheduled for surgery with Dr. Harrell. We have that arranged as follows:     Pre-op Physical:  Call your primary clinic to schedule.    Surgery Date: 5/9/2023     [facility]    [address], [address], [place]  [address]    Approximate Arrival Time: 6:30 am  (Unless instructed differently by the pre-op call nurse)     Post op Appointment: 5/11/2023 at 3:45 pm with  Dr. Harrell  [facility]   [facility], [place] [address].    Prep Tasks and Info:     A pre-op physical with your primary care doctor is required before surgery. This must be 10-30 days before surgery.  Since it required by anesthesia, your surgery will be cancelled if it's not done. Call your clinic asap to get this scheduled.    Review your medications with your primary care or prescribing physician; they will advise you which meds to stop and when, and when you can resume taking.  Certain medications like blood thinners need to be stopped in advance of surgery to proceed safely.    Please shower the evening before and morning of surgery with Hibiclens soap.  This can be found at your local pharmacy.     Fasting instructions will be provided by the pre-op nurse who will call you 1-3 days before surgery.  Typically we advise normal food up to 8 hours before you arrive for surgery. Clear liquids only from then until 2 hours before you arrive surgery then nothing at all by mouth.  The nurse will review your specific instructions with you at the call.      Smoking impacts your body's ability to heal properly so we advise patients to quit if possible before surgery.  Plastic Surgery patients are required to be nicotine free for at least 8 weeks before surgery.      You will need an adult to drive you home and stay with you 24 hours after surgery. Public transportation or Medical Van  Services are not permitted.    Visitor restrictions are subject to change, please verify with the pre-op nurse when they call how many people are permitted to accompany you.    We always encourage you to notify your insurance any time you have medical tests or procedures scheduled including surgery. The number is usually right on the back of your insurance card. Please call [facility] Cost of Care at [phone] if you'd like a surgery quote.        Call our office if you have any questions! Thank you!

## 2023-05-01 ENCOUNTER — VIRTUAL VISIT (OUTPATIENT)
Dept: PSYCHOLOGY | Facility: CLINIC | Age: 28
End: 2023-05-01
Payer: COMMERCIAL

## 2023-05-01 ENCOUNTER — OFFICE VISIT (OUTPATIENT)
Dept: FAMILY MEDICINE | Facility: CLINIC | Age: 28
End: 2023-05-01
Payer: COMMERCIAL

## 2023-05-01 VITALS
BODY MASS INDEX: 23.1 KG/M2 | RESPIRATION RATE: 19 BRPM | HEIGHT: 71 IN | TEMPERATURE: 98.5 F | DIASTOLIC BLOOD PRESSURE: 71 MMHG | WEIGHT: 165 LBS | OXYGEN SATURATION: 100 % | SYSTOLIC BLOOD PRESSURE: 103 MMHG | HEART RATE: 114 BPM

## 2023-05-01 DIAGNOSIS — F90.9 ATTENTION DEFICIT HYPERACTIVITY DISORDER (ADHD), UNSPECIFIED ADHD TYPE: ICD-10-CM

## 2023-05-01 DIAGNOSIS — F33.41 RECURRENT MAJOR DEPRESSIVE DISORDER, IN PARTIAL REMISSION (H): ICD-10-CM

## 2023-05-01 DIAGNOSIS — N52.2 DRUG-INDUCED ERECTILE DYSFUNCTION: ICD-10-CM

## 2023-05-01 DIAGNOSIS — K60.2 ANAL FISSURE: ICD-10-CM

## 2023-05-01 DIAGNOSIS — F41.1 GENERALIZED ANXIETY DISORDER: Primary | ICD-10-CM

## 2023-05-01 DIAGNOSIS — F64.9 GENDER DYSPHORIA: ICD-10-CM

## 2023-05-01 DIAGNOSIS — Z01.818 PREOP GENERAL PHYSICAL EXAM: Primary | ICD-10-CM

## 2023-05-01 PROCEDURE — 99214 OFFICE O/P EST MOD 30 MIN: CPT | Performed by: STUDENT IN AN ORGANIZED HEALTH CARE EDUCATION/TRAINING PROGRAM

## 2023-05-01 PROCEDURE — 90837 PSYTX W PT 60 MINUTES: CPT | Mod: VID | Performed by: MARRIAGE & FAMILY THERAPIST

## 2023-05-01 ASSESSMENT — PATIENT HEALTH QUESTIONNAIRE - PHQ9: SUM OF ALL RESPONSES TO PHQ QUESTIONS 1-9: 3

## 2023-05-01 NOTE — PROGRESS NOTES
[facility] - Progress Note    Date of Service: 23   Name: Ilan Bazzi  : 1995  Medical Record Number: [id number]  Treating Provider: Eros Coppola LMFT  Type of Session: Individual  Present in Session: pt  Session Start and Stop Time: 1058-5853  Number of Minutes:  53    SERVICE MODALITY:  Video Visit:      Provider verified identity through the following two step process.  Patient provided:  Patient is known previously to provider    Telemedicine Visit: The patient's condition can be safely assessed and treated via synchronous audio and visual telemedicine encounter.      Reason for Telemedicine Visit: Services only offered telehealth    Originating Site (Patient Location): Patient's home    Distant Site (Provider Location): [facility]    Consent:  The patient/guardian has verbally consented to: the potential risks and benefits of telemedicine (video visit) versus in person care; bill my insurance or make self-payment for services provided; and responsibility for payment of non-covered services.     Patient would like the video invitation sent by:  My Chart    Mode of Communication:  Video Conference [facility]    Distant Location (Provider):  Off-site    As the provider I attest to compliance with applicable laws and regulations related to telemedicine.    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  302.85 (F64.1) Gender dysphoria in Adolescents and Adults  Posttransition  ADHD by history   Provisional Diagnostic Hypothesis (Explain R/O, other Provisional Diagnosis, and why alternative Diagnosis that were considered were ruled out): NA      Current Reported Symptoms and Status update:  Changes since last session-had consult  "for gender affirming care-has surgery scheduled!  Pt wants to increase boundaries around sexual expression   Pt wants to integrate sexual identity and expression with partners       Progress Toward Treatment Goals:   Satisfactory progress     Therapeutic Interventions/Treatment Strategies:    Area(s) of treatment focus addressed in this session included Symptom Management, Interpersonal Relationship Skills and Sexual Health and Wellness    Assignment:  Choose your hard-work on systems  Explore challenging self talk and trusting self intentionally BRAVING  Staying in the moment with others  \"what if I just start\" start doing instead of thinking    Wants: better working memory, increased automatic response for discipline in building a routine, increased task initiation      Psychotherapist offered support, feedback and validation and reinforced use of skills Treatment modalities used include Motivational Interviewing Cognitive Behavioral Therapy Sexual Health and Wellness Education Sex Therapy Behavioral Activation: Encouraged strategies to reduce individual procrastination and increase motivation by increasing goal-directed activities to enhance mood and reduce symptoms. and discussed application of self compassion, facilitated discussion about medical interventions and explored challenging unrealistic expectations of self/others  Support, Feedback, Problem Solving and Education    Patient Response:   Patient responded to session by accepting feedback, listening, focusing on goals and accepting support  Possible barriers to participation / learning include: N/A    Current Mental Status Exam:   Appearance:  Appropriate   Eye Contact:  Good   Attitude / Demeanor: Cooperative   Speech      Rate / Production: Normal/ Responsive      Volume:  Normal  volume  Orientation:  All  Mood:   Anxious   Affect:   Appropriate   Thought Content: Clear   Insight:   Good       Plan/Need for Future Services:  Return for therapy in 2 " weeks to treat diagnosed problems.    Patient has a current master individualized treatment plan.  See Epic treatment plan for more information.    Referral / Collaboration:  Referral to another professional/service is not indicated at this time..  Emergency Services Needed?  No    Assignment:  Timer and reminders to repeat everything back  3-5 second rule-paired with vocalization and physical movement-using timers, post its, telling friends you are going to start talking out loud to yourself  Use body based movement to match body energy and to get brain energy online-    Interactive Complexity:  There are four specific communication difficulties that complicate the work of the primary psychiatric procedure.  Interactive complexity (+70661) may be reported when at least one of these difficulties is present.    Communication difficulties present during current the psychiatric procedure include:  1. None.      Signature/Title:          Eros Coppola LMFT

## 2023-05-01 NOTE — PATIENT INSTRUCTIONS
How to Take Your Medication Before Surgery  - Take all of your medications before surgery except as noted below  - HOLD (do not take) Adderall    Preparing for Your Surgery  Getting started  A nurse will call you to review your health history and instructions. They will give you an arrival time based on your scheduled surgery time. Please be ready to share:  Your doctor's clinic name and phone number  Your medical, surgical, and anesthesia history  A list of allergies and sensitivities  A list of medicines, including herbal treatments and over-the-counter drugs  Whether the patient has a legal guardian (ask how to send us the papers in advance)  Please tell us if you're pregnant--or if there's any chance you might be pregnant. Some surgeries may injure a fetus (unborn baby), so they require a pregnancy test. Surgeries that are safe for a fetus don't always need a test, and you can choose whether to have one.   If you have a child who's having surgery, please ask for a copy of Preparing for Your Child's Surgery.    Preparing for surgery  Within 10 to 30 days of surgery: Have a pre-op exam (sometimes called an H&P, or History and Physical). This can be done at a clinic or pre-operative center.  If you're having a , you may not need this exam. Talk to your care team.  At your pre-op exam, talk to your care team about all medicines you take. If you need to stop any medicines before surgery, ask when to start taking them again.  We do this for your safety. Many medicines can make you bleed too much during surgery. Some change how well surgery (anesthesia) drugs work.  Call your insurance company to let them know you're having surgery. (If you don't have insurance, call [phone].)  Call your clinic if there's any change in your health. This includes signs of a cold or flu (sore throat, runny nose, cough, rash, fever). It also includes a scrape or scratch near the surgery site.  If you have questions on the  day of surgery, call your hospital or surgery center.  Eating and drinking guidelines  For your safety: Unless your surgeon tells you otherwise, follow the guidelines below.  Eat and drink as usual until 8 hours before you arrive for surgery. After that, no food or milk.  Drink clear liquids until 2 hours before you arrive. These are liquids you can see through, like water, Gatorade, and Propel Water. They also include plain black coffee and tea (no cream or milk), candy, and breath mints. You can spit out gum when you arrive.  If you drink alcohol: Stop drinking it the night before surgery.  If your care team tells you to take medicine on the morning of surgery, it's okay to take it with a sip of water.  Preventing infection  Shower or bathe the night before and morning of your surgery. Follow the instructions your clinic gave you. (If no instructions, use regular soap.)  Don't shave or clip hair near your surgery site. We'll remove the hair if needed.  Don't smoke or vape the morning of surgery. You may chew nicotine gum up to 2 hours before surgery. A nicotine patch is okay.  Note: Some surgeries require you to completely quit smoking and nicotine. Check with your surgeon.  Your care team will make every effort to keep you safe from infection. We will:  Clean our hands often with soap and water (or an alcohol-based hand rub).  Clean the skin at your surgery site with a special soap that kills germs.  Give you a special gown to keep you warm. (Cold raises the risk of infection.)  Wear special hair covers, masks, gowns and gloves during surgery.  Give antibiotic medicine, if prescribed. Not all surgeries need antibiotics.  What to bring on the day of surgery  Photo ID and insurance card  Copy of your health care directive, if you have one  Glasses and hearing aids (bring cases)  You can't wear contacts during surgery  Inhaler and eye drops, if you use them (tell us about these when you arrive)  CPAP machine or  breathing device, if you use them  A few personal items, if spending the night  If you have . . .  A pacemaker, ICD (cardiac defibrillator) or other implant: Bring the ID card.  An implanted stimulator: Bring the remote control.  A legal guardian: Bring a copy of the certified (court-stamped) guardianship papers.  Please remove any jewelry, including body piercings. Leave jewelry and other valuables at home.  If you're going home the day of surgery  You must have a responsible adult drive you home. They should stay with you overnight as well.  If you don't have someone to stay with you, and you aren't safe to go home alone, we may keep you overnight. Insurance often won't pay for this.  After surgery  If it's hard to control your pain or you need more pain medicine, please call your surgeon's office.  Questions?   If you have any questions for your care team, list them here: _________________________________________________________________________________________________________________________________________________________________________ ____________________________________ ____________________________________ ____________________________________

## 2023-05-01 NOTE — NURSING NOTE
Is the patient currently in the state of [place]? YES    Visit mode:VIDEO    If the visit is dropped, the patient can be reconnected by: VIDEO VISIT:  Send e-mail to at [email or web address]    Will anyone else be joining the visit? No  (If patient encounters technical issues they should call [phone])    How would you like to obtain your AVS? MyChart    Are changes needed to the allergy or medication list? NO    Rooming Documentation: Patient will complete qnrs in mychart    Reason for visit: Video Visit     SAMMY Schreiber

## 2023-05-01 NOTE — PROGRESS NOTES
[address]  [address] [place] [id number]  Phone: [phone]  Fax: [phone]  Primary Provider: Sofy Wallace  Pre-op Performing Provider: SOFY WALLACE    PREOPERATIVE EVALUATION:  Today's date: 5/1/2023    Ilan Bazzi is a 27 year old adult who presents for a preoperative evaluation.      5/1/2023     3:27 PM   Additional Questions   Roomed by ramona   Accompanied by self     Surgical Information:  Surgery/Procedure: Breast Augmentation  Surgery Location: [facility]  Surgeon: Dr Harrell  Surgery Date: 5/9/23  Time of Surgery: 0630am  Where patient plans to recover: At home with family  Fax number for surgical facility: Note does not need to be faxed, will be available electronically in Epic.     Assessment & Plan     The proposed surgical procedure is considered LOW risk.    Preop general physical exam  Breast augmentation - preop eval completed. Healthy pt and low risk procedure, no additional workup indicated.     Gender dysphoria  Caprini score 3, does not need to hold oral estradiol perioperatively. Due for annual HRT labs in July.     Attention deficit hyperactivity disorder (ADHD), unspecified ADHD type  Working with therapist focused on ADHD, affirmed insights and coping strategies. Will trial lower XR dose with small IR dose for prn use later in the day.   - amphetamine-dextroamphetamine (ADDERALL XR) 30 MG 24 hr capsule  Dispense: 30 capsule; Refill: 0  - amphetamine-dextroamphetamine (ADDERALL) 10 MG tablet  Dispense: 30 tablet; Refill: 0    Anal fissure  Still having some bleeding. Didn't tolerate nitroglycerin due to headaches. Using fiber consistently. Encouraged Ilan to message her CR provider to discuss next steps.        - No identified additional risk factors other than previously addressed    Antiplatelet or Anticoagulation Medication Instructions:   - Patient is on no antiplatelet or anticoagulation  medications.    Additional Medication Instructions:  Patient is to take all scheduled medications on the day of surgery EXCEPT for modifications listed below:   stimulants    RECOMMENDATION:  APPROVAL GIVEN to proceed with proposed procedure, without further diagnostic evaluation.    Prescription drug management  30 minutes spent by me on the date of the encounter doing chart review, history and exam, documentation and further activities per the note      Subjective     HPI related to upcoming procedure:     Working on laser hair removal coverage   Sabrina also has ADHD and is an ADHD specialist, meeting w/ her for therapy - getting a lot of really helpful advice   Feeling better about fatigue symptoms and attention symptoms since meeting w/ therapy     Still has some short release left   Still thinking about short release later in the day, restarting that   Takes long release around 0930-10, takes a short release around 1600   sleeping better lately, exercising regularly   Partner Trevon recently diagnosed, her therapist was talking about how if Adderall starts affecting sleep   - some nights melatonin just 0.5mg or 1mg       Health Care Directive:  Patient does not have a Health Care Directive or Living Will: No    Preoperative Review of :   reviewed - controlled substances reflected in medication list.      Status of Chronic Conditions:  See problem list for active medical problems.  Problems all longstanding and stable, except as noted/documented.  See ROS for pertinent symptoms related to these conditions.      Review of Systems  Constitutional, neuro, ENT, endocrine, pulmonary, cardiac, gastrointestinal, genitourinary, musculoskeletal, integument and psychiatric systems are negative, except as otherwise noted.    Patient Active Problem List    Diagnosis Date Noted     Gender dysphoria 04/20/2023     Priority: Medium     Added automatically from request for surgery [id number]       Recurrent major depressive  disorder, in partial remission (H) 05/16/2022     Priority: Medium     Generalized anxiety disorder 05/16/2022     Priority: Medium     Drug-induced erectile dysfunction 05/16/2022     Priority: Medium     Restless legs syndrome (RLS) 08/12/2021     Priority: Medium     Low ferritin August 2021, improved w/ supplementation        ADHD (attention deficit hyperactivity disorder) 04/27/2021     Priority: Medium     Depression 04/27/2021     Priority: Medium     PTSD (post-traumatic stress disorder) 04/27/2021     Priority: Medium     Gender dysphoria in adolescent and adult 12/09/2020     Priority: Medium     Feminizing HRT since April 2019       Mild anxiety 10/16/2020     Priority: Medium     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 Anterior frontal sinus osteotomy and setback,  2. Bony recontouring of forehead, fronto-orbital bar,  3. Bony recontouring of naso-frontal junction,  4. Bony recontouring and ostectomy of orbits, bilateral,  5. Bony recontouring of bilateral anterior temporal lines,  7. Elevation of bi-pedicled pericranial flap, with release and decompression of bilateral supra-orbital neurovascular b     RECONSTRUCT MANDIBLE N/A 3/9/2021    Procedure: 8. Ostectomy and contouring of mandible and chin,  9. Thyroid reduction chondroplasty,;  Surgeon: Shayla Cardenas MD;  Location: [facility] OR     SUSPEND BROW Bilateral 3/9/2021    Procedure: 6. Open repair of bilateral brow ptosis with forehead Endotine,;  Surgeon: Ronald  "Shayla Ramos MD;  Location: [facility] OR     Current Outpatient Medications   Medication Sig Dispense Refill     amphetamine-dextroamphetamine (ADDERALL XR) 30 MG 24 hr capsule Take 1 capsule (30 mg) by mouth daily 30 capsule 0     amphetamine-dextroamphetamine (ADDERALL) 10 MG tablet Take 1 tablet (10 mg) by mouth daily at 2 pm 30 tablet 0     estradiol (ESTRACE) 2 MG tablet Take 2 tablets (4 mg) by mouth 2 times daily 120 tablet 4     Ferrous Gluconate 240 (27 Fe) MG TABS Take by mouth daily       finasteride (PROPECIA) 1 MG tablet Take 1 tablet (1 mg) by mouth every evening 90 tablet 2     Multiple Vitamins-Minerals (MULTIVITAL PO) Take by mouth every morning        nifedipine 0.2% in white petrolatum 0.2 % OINT ointment Apply topically 2 times daily 100 g 1     nitroGLYcerin 0.2% ointment Apply pea sized amount to affected anal skin twice daily. 30 g 1     venlafaxine (EFFEXOR XR) 75 MG 24 hr capsule Take 1 capsule (75 mg) by mouth daily 30 capsule 11       Allergies   Allergen Reactions     Wellbutrin [Bupropion] Swelling     Pruritus and edema to feet        Social History     Tobacco Use     Smoking status: Never     Smokeless tobacco: Never   Vaping Use     Vaping status: Never Used   Substance Use Topics     Alcohol use: Not Currently     Family History   Problem Relation Age of Onset     Depression Mother      Macular Degeneration Father      Other - See Comments Father 66        \"total autonomic failure\" diagnosed at [facility]     Alzheimer Disease Maternal Grandmother      Myocardial Infarction Maternal Grandfather      Coronary Artery Disease Paternal Grandmother      Myocardial Infarction Paternal Grandmother 70     Glaucoma No family hx of      Family hx of issues with general anesthesia: the paralytic worked but the knockout wore off and he woke up paralyzed, had PTSD from that. Cousin had a similar issue with not enough anesthesia and then got too much anesthetic and then went into cardiac arrest. Ilan herself has " "issues w/ topical anesthetic         Objective     /71   Pulse 114   Temp 98.5  F (36.9  C)   Resp 19   Ht 1.803 m (5' 11\")   Wt 74.8 kg (165 lb)   SpO2 100%   BMI 23.01 kg/m      Physical Exam    GENERAL APPEARANCE: healthy, alert and no distress     EYES: EOMI,  PERRL     HENT: nose and mouth without ulcers or lesions     NECK: no adenopathy, no asymmetry, masses, or scars and thyroid normal to palpation     RESP: lungs clear to auscultation - no rales, rhonchi or wheezes     CV: regular rates and rhythm, normal S1 S2, no S3 or S4 and no murmur, click or rub     MS: extremities normal- no gross deformities noted, no evidence of inflammation in joints, FROM in all extremities.     SKIN: no suspicious lesions or rashes     NEURO: Normal strength and tone, sensory exam grossly normal, mentation intact and speech normal     PSYCH: mentation appears normal. and affect normal/bright     LYMPHATICS: No cervical adenopathy    Recent Labs   Lab Test 07/28/22  1315 08/03/21  1341 08/03/21  1340   HGB 15.0  --  13.8    140  --    POTASSIUM 4.3 4.0  --    CR 0.99 0.87  --         caprini score 3     Diagnostics:  No labs were ordered during this visit.   No EKG required for low risk surgery (cataract, skin procedure, breast biopsy, etc).    Revised Cardiac Risk Index (RCRI):  The patient has the following serious cardiovascular risks for perioperative complications:   - No serious cardiac risks = 0 points     RCRI Interpretation: 0 points: Class I (very low risk - 0.4% complication rate)           Signed Electronically by: Sofy Hudson DO  Copy of this evaluation report is provided to requesting physician.      "

## 2023-05-01 NOTE — PROGRESS NOTES
"  Assessment & Plan     There are no diagnoses linked to this encounter.    {[facility] 2021 Documentation (Optional):377405}  {2021 E&M time (Optional):411883}  {Provider  Link to [facility] Help Grid :936644}     {FOLLOW UP PLANS (Optional) Includes COVID19 Treatment Plan:314991}    No follow-ups on file.    Sofy Tristan [facility] TERRANCE Talavera is a 27 year old, presenting for the following health issues:  No chief complaint on file.         View : No data to display.              HPI     I am not sure if this is a result of the stimulant (whether too high or whatnot) or something else but I've noticed a lot of fatigue lately thats been making it difficult to get things done. My sleep has been a little weird with going to bed early sometimes and waking up early and taking a nap during the day, rather than sleeping through the night and that being my only sleep. I've also noticed that without the stimulant it can be extremely difficult to even stay awake at times. I have had an odd relationship with sleep my whole life and I'm wondering if that's not playing a role here but I have also noticed an increase in difficulties stemming from self image/self sabotage things related to depression.     A lot to drop here in one message, I know but I wanted to put it here so it's primed for our appointment next week.     ADHD Follow-Up    Changes since last visit: Yes:     Increased from 30 to 40mg XR daily in January. Taking 10mg IR daily early afternoon.     Status since last visit: {ADHD STATUS:947230}  Taking controlled (daily) medications as prescribed: {YES NO YES DEFAULT:186317::\"Yes\"}  Sleep: {ADHD SLEEP:289080::\"no problems\"}  Adult ADHD Self-Reporting form given to patient?:  {YES / NO:268243::\"Yes\"}  Currently in counseling: {YES / NO:284327::\"Yes\"}    ++++++++++++++++++++++++++++++++++++++++++++++++    Employer Concerns/Feedback: {ADHD STATUS:887819}  Coworker Concerns:  {ADHD " "STATUS:514425}    Home/Family Concerns: {ADHD STATUS:978861}    Co-Morbid Diagnosis: {ADHD COMORBID DIAGNOSIS:060712}    Adult ADHD Self-Reporting form reviewed score ***:  {YES / NO:254605::\"Yes\"}      Medication Benefits:   Controlled symptoms: {ADHDtargetSYMPTOMS:886119}  Uncontrolled symptoms: {ADHDtargetSYMPTOMS:059429}    Medication Side Effects:  Has:  {side effects:865439}  Denies:  {side effects:886282}    Medication Monitoring:   Reviewed? {YES/NO:60}  Urine drug screen in last 1 year? {YES, DATE/NO:793061}  Controlled substance agreement signed in last 1 year? {YES, DATE/NO:831747}  Visit Schedule? {followupvisitfrequency:798354::\"3 months\"} Next office visit due: ***    {Recommendations for monitorin) Once yearly drug screening  2) Annual controlled substance agreement (Use letter template, Controlled Substance Agreement - Non-opioid)  3) Office visit every 3 months, select patients may extend to 6 months per provider discretion}           {SUPERLIST (Optional):202770}  {additonal problems for provider to add (Optional):500232}      Review of Systems   {ROS COMP (Optional):451098}      Objective    There were no vitals taken for this visit.  There is no height or weight on file to calculate BMI.  Physical Exam   {Exam List (Optional):380413}    {Diagnostic Test Results (Optional):089208}    {AMBULATORY ATTESTATION (Optional):104942}            "

## 2023-05-02 RX ORDER — DEXTROAMPHETAMINE SACCHARATE, AMPHETAMINE ASPARTATE, DEXTROAMPHETAMINE SULFATE AND AMPHETAMINE SULFATE 2.5; 2.5; 2.5; 2.5 MG/1; MG/1; MG/1; MG/1
10 TABLET ORAL
Qty: 30 TABLET | Refills: 0 | Status: SHIPPED | OUTPATIENT
Start: 2023-05-02 | End: 2023-06-08

## 2023-05-02 RX ORDER — DEXTROAMPHETAMINE SACCHARATE, AMPHETAMINE ASPARTATE MONOHYDRATE, DEXTROAMPHETAMINE SULFATE AND AMPHETAMINE SULFATE 7.5; 7.5; 7.5; 7.5 MG/1; MG/1; MG/1; MG/1
30 CAPSULE, EXTENDED RELEASE ORAL DAILY
Qty: 30 CAPSULE | Refills: 0 | Status: SHIPPED | OUTPATIENT
Start: 2023-05-02 | End: 2023-06-08

## 2023-05-03 ENCOUNTER — ANESTHESIA EVENT (OUTPATIENT)
Dept: SURGERY | Facility: AMBULATORY SURGERY CENTER | Age: 28
End: 2023-05-03
Payer: COMMERCIAL

## 2023-05-03 RX ORDER — CEPHALEXIN 500 MG/1
500 CAPSULE ORAL 3 TIMES DAILY
Qty: 21 CAPSULE | Refills: 0 | Status: SHIPPED | OUTPATIENT
Start: 2023-05-03 | End: 2023-05-19

## 2023-05-03 RX ORDER — DOCUSATE SODIUM 100 MG/1
100 CAPSULE, LIQUID FILLED ORAL 2 TIMES DAILY
Qty: 20 CAPSULE | Refills: 0 | Status: SHIPPED | OUTPATIENT
Start: 2023-05-03 | End: 2023-05-19

## 2023-05-03 RX ORDER — OXYCODONE AND ACETAMINOPHEN 5; 325 MG/1; MG/1
1 TABLET ORAL EVERY 6 HOURS PRN
Qty: 21 TABLET | Refills: 0 | Status: SHIPPED | OUTPATIENT
Start: 2023-05-03 | End: 2023-05-09

## 2023-05-03 RX ORDER — MUPIROCIN 20 MG/G
OINTMENT TOPICAL 3 TIMES DAILY
Qty: 30 G | Refills: 0 | Status: SHIPPED | OUTPATIENT
Start: 2023-05-03 | End: 2023-05-09

## 2023-05-03 RX ORDER — ONDANSETRON 4 MG/1
4 TABLET, FILM COATED ORAL EVERY 6 HOURS PRN
Qty: 16 TABLET | Refills: 0 | Status: SHIPPED | OUTPATIENT
Start: 2023-05-03 | End: 2023-05-19

## 2023-05-04 NOTE — PROGRESS NOTES
Preoperative prescriptions has been sent to patient's pharmacy.  Patient ready for surgery.    Gareth Harrell MD , FACS   Diplomate American Board of Plastic Surgery  Diplomate American Board of Surgery  Adj. Assistant Professor of Surgery  Division of Plastic & Reconstructive Surgery   [facility]  Office: [phone]   5/3/2023 at 8:08 PM

## 2023-05-09 ENCOUNTER — ANESTHESIA (OUTPATIENT)
Dept: SURGERY | Facility: AMBULATORY SURGERY CENTER | Age: 28
End: 2023-05-09
Payer: COMMERCIAL

## 2023-05-09 ENCOUNTER — HOSPITAL ENCOUNTER (OUTPATIENT)
Facility: AMBULATORY SURGERY CENTER | Age: 28
Discharge: HOME | End: 2023-05-09
Attending: PLASTIC SURGERY | Admitting: PLASTIC SURGERY
Payer: COMMERCIAL

## 2023-05-09 VITALS
BODY MASS INDEX: 22.54 KG/M2 | RESPIRATION RATE: 16 BRPM | TEMPERATURE: 97.5 F | SYSTOLIC BLOOD PRESSURE: 130 MMHG | WEIGHT: 161 LBS | HEIGHT: 71 IN | OXYGEN SATURATION: 95 % | HEART RATE: 115 BPM | DIASTOLIC BLOOD PRESSURE: 80 MMHG

## 2023-05-09 DIAGNOSIS — F64.9 GENDER DYSPHORIA: ICD-10-CM

## 2023-05-09 PROCEDURE — 19325 BREAST AUGMENTATION W/IMPLT: CPT | Mod: 50 | Performed by: PLASTIC SURGERY

## 2023-05-09 PROCEDURE — 19325 BREAST AUGMENTATION W/IMPLT: CPT | Mod: RT

## 2023-05-09 RX ORDER — KETAMINE HYDROCHLORIDE 10 MG/ML
INJECTION INTRAMUSCULAR; INTRAVENOUS PRN
Status: DISCONTINUED | OUTPATIENT
Start: 2023-05-09 | End: 2023-05-09

## 2023-05-09 RX ORDER — HYDROMORPHONE HYDROCHLORIDE 1 MG/ML
0.2 INJECTION, SOLUTION INTRAMUSCULAR; INTRAVENOUS; SUBCUTANEOUS EVERY 5 MIN PRN
Status: DISCONTINUED | OUTPATIENT
Start: 2023-05-09 | End: 2023-05-09 | Stop reason: HOSPADM

## 2023-05-09 RX ORDER — ONDANSETRON 2 MG/ML
INJECTION INTRAMUSCULAR; INTRAVENOUS PRN
Status: DISCONTINUED | OUTPATIENT
Start: 2023-05-09 | End: 2023-05-09

## 2023-05-09 RX ORDER — SODIUM CHLORIDE, SODIUM LACTATE, POTASSIUM CHLORIDE, CALCIUM CHLORIDE 600; 310; 30; 20 MG/100ML; MG/100ML; MG/100ML; MG/100ML
INJECTION, SOLUTION INTRAVENOUS CONTINUOUS
Status: DISCONTINUED | OUTPATIENT
Start: 2023-05-09 | End: 2023-05-09 | Stop reason: HOSPADM

## 2023-05-09 RX ORDER — CEFAZOLIN SODIUM 2 G/50ML
2 SOLUTION INTRAVENOUS
Status: COMPLETED | OUTPATIENT
Start: 2023-05-09 | End: 2023-05-09

## 2023-05-09 RX ORDER — PROPOFOL 10 MG/ML
INJECTION, EMULSION INTRAVENOUS CONTINUOUS PRN
Status: DISCONTINUED | OUTPATIENT
Start: 2023-05-09 | End: 2023-05-09

## 2023-05-09 RX ORDER — LIDOCAINE HYDROCHLORIDE 20 MG/ML
INJECTION, SOLUTION INFILTRATION; PERINEURAL PRN
Status: DISCONTINUED | OUTPATIENT
Start: 2023-05-09 | End: 2023-05-09

## 2023-05-09 RX ORDER — LIDOCAINE 40 MG/G
CREAM TOPICAL
Status: DISCONTINUED | OUTPATIENT
Start: 2023-05-09 | End: 2023-05-09 | Stop reason: HOSPADM

## 2023-05-09 RX ORDER — DEXAMETHASONE SODIUM PHOSPHATE 4 MG/ML
INJECTION, SOLUTION INTRA-ARTICULAR; INTRALESIONAL; INTRAMUSCULAR; INTRAVENOUS; SOFT TISSUE PRN
Status: DISCONTINUED | OUTPATIENT
Start: 2023-05-09 | End: 2023-05-09

## 2023-05-09 RX ORDER — PROPOFOL 10 MG/ML
INJECTION, EMULSION INTRAVENOUS PRN
Status: DISCONTINUED | OUTPATIENT
Start: 2023-05-09 | End: 2023-05-09

## 2023-05-09 RX ORDER — FENTANYL CITRATE 50 UG/ML
25 INJECTION, SOLUTION INTRAMUSCULAR; INTRAVENOUS EVERY 5 MIN PRN
Status: DISCONTINUED | OUTPATIENT
Start: 2023-05-09 | End: 2023-05-09 | Stop reason: HOSPADM

## 2023-05-09 RX ORDER — EPHEDRINE SULFATE 50 MG/ML
INJECTION, SOLUTION INTRAMUSCULAR; INTRAVENOUS; SUBCUTANEOUS PRN
Status: DISCONTINUED | OUTPATIENT
Start: 2023-05-09 | End: 2023-05-09

## 2023-05-09 RX ORDER — HYDROMORPHONE HYDROCHLORIDE 1 MG/ML
0.4 INJECTION, SOLUTION INTRAMUSCULAR; INTRAVENOUS; SUBCUTANEOUS EVERY 5 MIN PRN
Status: DISCONTINUED | OUTPATIENT
Start: 2023-05-09 | End: 2023-05-09 | Stop reason: HOSPADM

## 2023-05-09 RX ORDER — OXYCODONE HYDROCHLORIDE 5 MG/1
5 TABLET ORAL
Status: DISCONTINUED | OUTPATIENT
Start: 2023-05-09 | End: 2023-05-10 | Stop reason: HOSPADM

## 2023-05-09 RX ORDER — OXYCODONE HYDROCHLORIDE 5 MG/1
10 TABLET ORAL
Status: DISCONTINUED | OUTPATIENT
Start: 2023-05-09 | End: 2023-05-10 | Stop reason: HOSPADM

## 2023-05-09 RX ORDER — ACETAMINOPHEN 325 MG/1
975 TABLET ORAL ONCE
Status: COMPLETED | OUTPATIENT
Start: 2023-05-09 | End: 2023-05-09

## 2023-05-09 RX ORDER — DEXMEDETOMIDINE HYDROCHLORIDE 4 UG/ML
INJECTION, SOLUTION INTRAVENOUS PRN
Status: DISCONTINUED | OUTPATIENT
Start: 2023-05-09 | End: 2023-05-09

## 2023-05-09 RX ORDER — LABETALOL HYDROCHLORIDE 5 MG/ML
10 INJECTION, SOLUTION INTRAVENOUS
Status: DISCONTINUED | OUTPATIENT
Start: 2023-05-09 | End: 2023-05-09 | Stop reason: HOSPADM

## 2023-05-09 RX ORDER — FENTANYL CITRATE 50 UG/ML
INJECTION, SOLUTION INTRAMUSCULAR; INTRAVENOUS PRN
Status: DISCONTINUED | OUTPATIENT
Start: 2023-05-09 | End: 2023-05-09

## 2023-05-09 RX ORDER — ONDANSETRON 4 MG/1
4 TABLET, ORALLY DISINTEGRATING ORAL EVERY 30 MIN PRN
Status: DISCONTINUED | OUTPATIENT
Start: 2023-05-09 | End: 2023-05-09 | Stop reason: HOSPADM

## 2023-05-09 RX ORDER — FENTANYL CITRATE 50 UG/ML
50 INJECTION, SOLUTION INTRAMUSCULAR; INTRAVENOUS EVERY 5 MIN PRN
Status: DISCONTINUED | OUTPATIENT
Start: 2023-05-09 | End: 2023-05-09 | Stop reason: HOSPADM

## 2023-05-09 RX ORDER — ONDANSETRON 2 MG/ML
4 INJECTION INTRAMUSCULAR; INTRAVENOUS EVERY 30 MIN PRN
Status: DISCONTINUED | OUTPATIENT
Start: 2023-05-09 | End: 2023-05-09 | Stop reason: HOSPADM

## 2023-05-09 RX ADMIN — EPHEDRINE SULFATE 10 MG: 50 INJECTION, SOLUTION INTRAMUSCULAR; INTRAVENOUS; SUBCUTANEOUS at 08:43

## 2023-05-09 RX ADMIN — KETAMINE HYDROCHLORIDE 20 MG: 10 INJECTION INTRAMUSCULAR; INTRAVENOUS at 08:10

## 2023-05-09 RX ADMIN — PROPOFOL 200 MG: 10 INJECTION, EMULSION INTRAVENOUS at 08:07

## 2023-05-09 RX ADMIN — Medication 0.5 MG: at 08:32

## 2023-05-09 RX ADMIN — DEXMEDETOMIDINE HYDROCHLORIDE 8 MCG: 4 INJECTION, SOLUTION INTRAVENOUS at 10:22

## 2023-05-09 RX ADMIN — ACETAMINOPHEN 975 MG: 325 TABLET ORAL at 07:22

## 2023-05-09 RX ADMIN — Medication 0.5 MG: at 09:13

## 2023-05-09 RX ADMIN — DEXMEDETOMIDINE HYDROCHLORIDE 4 MCG: 4 INJECTION, SOLUTION INTRAVENOUS at 10:44

## 2023-05-09 RX ADMIN — SODIUM CHLORIDE, SODIUM LACTATE, POTASSIUM CHLORIDE, CALCIUM CHLORIDE: 600; 310; 30; 20 INJECTION, SOLUTION INTRAVENOUS at 07:22

## 2023-05-09 RX ADMIN — PROPOFOL 150 MCG/KG/MIN: 10 INJECTION, EMULSION INTRAVENOUS at 08:07

## 2023-05-09 RX ADMIN — FENTANYL CITRATE 50 MCG: 50 INJECTION, SOLUTION INTRAMUSCULAR; INTRAVENOUS at 08:10

## 2023-05-09 RX ADMIN — EPHEDRINE SULFATE 5 MG: 50 INJECTION, SOLUTION INTRAMUSCULAR; INTRAVENOUS; SUBCUTANEOUS at 08:25

## 2023-05-09 RX ADMIN — EPHEDRINE SULFATE 5 MG: 50 INJECTION, SOLUTION INTRAMUSCULAR; INTRAVENOUS; SUBCUTANEOUS at 08:21

## 2023-05-09 RX ADMIN — CEFAZOLIN SODIUM 2 G: 2 SOLUTION INTRAVENOUS at 08:01

## 2023-05-09 RX ADMIN — FENTANYL CITRATE 50 MCG: 50 INJECTION, SOLUTION INTRAMUSCULAR; INTRAVENOUS at 08:49

## 2023-05-09 RX ADMIN — DEXAMETHASONE SODIUM PHOSPHATE 4 MG: 4 INJECTION, SOLUTION INTRA-ARTICULAR; INTRALESIONAL; INTRAMUSCULAR; INTRAVENOUS; SOFT TISSUE at 08:15

## 2023-05-09 RX ADMIN — LIDOCAINE HYDROCHLORIDE 100 MG: 20 INJECTION, SOLUTION INFILTRATION; PERINEURAL at 08:07

## 2023-05-09 RX ADMIN — ONDANSETRON 4 MG: 2 INJECTION INTRAMUSCULAR; INTRAVENOUS at 08:15

## 2023-05-09 RX ADMIN — EPHEDRINE SULFATE 5 MG: 50 INJECTION, SOLUTION INTRAMUSCULAR; INTRAVENOUS; SUBCUTANEOUS at 08:31

## 2023-05-09 RX ADMIN — DEXMEDETOMIDINE HYDROCHLORIDE 8 MCG: 4 INJECTION, SOLUTION INTRAVENOUS at 10:35

## 2023-05-09 NOTE — ANESTHESIA PREPROCEDURE EVALUATION
Anesthesia Pre-Procedure Evaluation    Patient: Ilan Bazzi   MRN: [id number] : 1995        Procedure : Procedure(s):  AUGMENTATION, BREAST BILATERAL          Past Medical History:   Diagnosis Date     Anxiety      Concussion without loss of consciousness      Left varicocele 2021      Past Surgical History:   Procedure Laterality Date     ENT SURGERY      wisdom teeth extraction     LE FORT ONE N/A 2017    Procedure: LE FORT ONE;  LEF FORT ONE OSTEOTOMY;  Surgeon: Murray Tran DDS;  Location: [facility]     RECONSTRUCT FOREHEAD N/A 3/9/2021    Procedure: 1. Anterior frontal sinus osteotomy and setback,  2. Bony recontouring of forehead, fronto-orbital bar,  3. Bony recontouring of naso-frontal junction,  4. Bony recontouring and ostectomy of orbits, bilateral,  5. Bony recontouring of bilateral anterior temporal lines,  7. Elevation of bi-pedicled pericranial flap, with release and decompression of bilateral supra-orbital neurovascular b     RECONSTRUCT MANDIBLE N/A 3/9/2021    Procedure: 8. Ostectomy and contouring of mandible and chin,  9. Thyroid reduction chondroplasty,;  Surgeon: Shayla Cardenas MD;  Location: [facility]     SUSPEND BROW Bilateral 3/9/2021    Procedure: 6. Open repair of bilateral brow ptosis with forehead Endotine,;  Surgeon: Shayla Cardenas MD;  Location: [facility] OR      Allergies   Allergen Reactions     Wellbutrin [Bupropion] Swelling     Pruritus and edema to feet      Social History     Tobacco Use     Smoking status: Never     Smokeless tobacco: Never   Vaping Use     Vaping status: Never Used   Substance Use Topics     Alcohol use: Not Currently      Wt Readings from Last 1 Encounters:   23 73 kg (161 lb)        Anesthesia Evaluation            ROS/MED HX  ENT/Pulmonary:  - neg pulmonary ROS     Neurologic:  - neg neurologic ROS     Cardiovascular:  - neg cardiovascular ROS     METS/Exercise Tolerance:     Hematologic:  - neg hematologic  ROS     Musculoskeletal:        GI/Hepatic:  - neg GI/hepatic ROS     Renal/Genitourinary:  - neg Renal ROS     Endo:  - neg endo ROS     Psychiatric/Substance Use:  - neg psychiatric ROS   (+) psychiatric history anxiety and depression     Infectious Disease:       Malignancy:       Other:               OUTSIDE LABS:  CBC:   Lab Results   Component Value Date    HGB 15.0 07/28/2022    HGB 13.8 08/03/2021     BMP:   Lab Results   Component Value Date     07/28/2022     08/03/2021    POTASSIUM 4.3 07/28/2022    POTASSIUM 4.0 08/03/2021    CHLORIDE 103 07/28/2022    CHLORIDE 108 08/03/2021    CO2 24 07/28/2022    CO2 29 08/03/2021    BUN 13.1 07/28/2022    BUN 13 08/03/2021    CR 0.99 07/28/2022    CR 0.87 08/03/2021    GLC 82 07/28/2022    GLC 92 08/03/2021     COAGS: No results found for: PTT, INR, FIBR  POC:   Lab Results   Component Value Date    BGM 93 03/09/2021     HEPATIC:   Lab Results   Component Value Date    ALBUMIN 4.4 07/28/2022    PROTTOTAL 7.1 07/28/2022    ALT 11 07/28/2022    AST 27 07/28/2022    ALKPHOS 45 07/28/2022    BILITOTAL 0.3 07/28/2022     OTHER:   Lab Results   Component Value Date    RYLEY 9.4 07/28/2022    TSH 0.86 11/12/2021       Anesthesia Plan    ASA Status:  2      Anesthesia Type: General.     - Airway: LMA              Consents    Anesthesia Plan(s) and associated risks, benefits, and realistic alternatives discussed. Questions answered and patient/representative(s) expressed understanding.     - Discussed: Risks, Benefits and Alternatives for BOTH SEDATION and the PROCEDURE were discussed     - Discussed with:  Patient      - Extended Intubation/Ventilatory Support Discussed: No.      - Patient is DNR/DNI Status: No    Use of blood products discussed: No .     Postoperative Care    Pain management: IV analgesics, Oral pain medications.   PONV prophylaxis: Ondansetron (or other 5HT-3), Dexamethasone or Solumedrol     Comments:                Emmett Burroughs MD, MD

## 2023-05-09 NOTE — ANESTHESIA CARE TRANSFER NOTE
Patient: Ilan Bazzi    Procedure: Procedure(s):  AUGMENTATION, BREAST BILATERAL       Diagnosis: Gender dysphoria [F64.9]  Diagnosis Additional Information: No value filed.    Anesthesia Type:   General     Note:      Level of Consciousness: awake and drowsy  Oxygen Supplementation: face mask  Level of Supplemental Oxygen (L/min / FiO2): 5  Independent Airway: airway patency satisfactory and stable  Dentition: dentition unchanged  Vital Signs Stable: post-procedure vital signs reviewed and stable  Report to RN Given: handoff report given  Patient transferred to: PACU    Handoff Report: Identifed the Patient, Identified the Reponsible Provider, Reviewed the pertinent medical history, Discussed the surgical course, Reviewed Intra-OP anesthesia mangement and issues during anesthesia, Set expectations for post-procedure period and Allowed opportunity for questions and acknowledgement of understanding      Vitals:  Vitals Value Taken Time   /80 05/09/23 1057   Temp     Pulse 95 05/09/23 1059   Resp 14 05/09/23 1059   SpO2 99 % 05/09/23 1059   Vitals shown include unvalidated device data.    Electronically Signed By: SCOTT Montejo CRNA  May 9, 2023  11:01 AM

## 2023-05-09 NOTE — DISCHARGE INSTRUCTIONS
[facility]  Home Care Following Anesthesia  For 24 hours after surgery:  Get plenty of rest.  A responsible adult must stay with you for at least 24 hours after you leave the surgery center.  Do not drive or use heavy equipment.  If you have weakness or tingling, don't drive or use heavy equipment until this feeling goes away.   Do not drink alcohol.   Avoid strenuous or risky activities.  Ask for help when climbing stairs.  You may feel lightheaded.  IF so, sit for a few minutes before standing.  Have someone help you get up.   If you have nausea (feel sick to your stomach): Drink only clear liquids such as apple juice, ginger ale, broth or 7-Up.  Rest may also help.  Be sure to drink enough fluids.  Move to a regular diet as you feel able.   You may have a slight fever.  Call the doctor if your fever is over 100 F (37.7 C) (taken under the tongue) or lasts longer than 24 hours.  You may have a dry mouth, a sore throat, muscle aches or trouble sleeping. These should go away after 24 hours.  Do not make important or legal decisions.   It is recommended to avoid smoking.               Tips for taking pain medications  To get the best pain relief possible, remember these points:  Take pain medications as directed, before pain becomes severe.  Pain medication can upset your stomach: taking it with food may help.  Constipation is a common side effect of pain medication. Drink plenty of  fluids.  Eat foods high in fiber. Take a stool softener if recommended by your doctor or pharmacist.  Do not drink alcohol, drive or operate machinery while taking pain medications.  Ask about other ways to control pain, such as with heat, ice or relaxation.    Tylenol/Acetaminophen Consumption  To help encourage the safe use of acetaminophen, the makers of TYLENOL  have lowered the maximum daily dose for single-ingredient Extra Strength TYLENOL  (acetaminophen) products sold in the [place] from 8 pills  per day (4,000 mg) to 6 pills per day (3,000 mg). The dosing interval has also changed from 2 pills every 4-6 hours to 2 pills every 6 hours.  If you feel your pain relief is insufficient, you may take Tylenol/Acetaminophen in addition to your narcotic pain medication.   Be careful not to exceed 3,000 mg of Tylenol/Acetaminophen in a 24 hour period from all sources.  If you are taking extra strength Tylenol/acetaminophen (500 mg), the maximum dose is 6 tablets in 24 hours.  If you are taking regular strength acetaminophen (325 mg), the maximum dose is 9 tablets in 24 hours.    Call a doctor for any of the following:  Signs of infection (fever, growing tenderness at the surgery site, a large amount of drainage or bleeding, severe pain, foul-smelling drainage, redness, swelling).  It has been over 8 to 10 hours since surgery and you are still not able to urinate (pass water).  Headache for over 24 hours.  Numbness, tingling or weakness the day after surgery (if you had spinal anesthesia).  Signs of Covid-19 infection (temperature over 100 degrees, shortness of breath, cough, loss of taste/smell, generalized body aches, persistent headache, chills, sore throat, nausea/vomiting/diarrhea)  Your doctor is:       Dr. Gareth Harrell, Plastic Surgery: [phone]               Or dial [phone] and ask for the resident on call for:  Plastics  For emergency care, call the:  [facility]:  [phone] (TTY for hearing impaired: [phone])

## 2023-05-10 NOTE — OP NOTE
Ilan Bazzi    May 9, 2023    Preoperative diagnosis: Gender dysphoria     Postoperative diagnosis: same     Procedure:      1) Bilateral augmentation mammoplasties in the prepectoral space.     For the right breast a  450 ml Allergan full profile implant was utilized.  Reference SCF-450 and SN [id number].    For the left breast a 450 ml Allergan full profile implant was utilized.  Reference SCF-450 and SN [id number].       Surgeon: Gareth Harrell MD.     Assistant: Sunni Navarro CSA  and Danita Khan MD (plastic surgery resident)     Anesthesia: General     IV fluids:   800 ml    EBL: 5 mL     Findings: Macroscopically normal-appearing breasts. Nipple to inframammary fold distance was 7 cm bilaterally, therefore, no lowering of the native inframammary fold was necessary.     Specimen: None     Drains: None     Disposition: Patient tolerated procedure well and was extubated and transferred to recovery room awake and in stable condition.     Indications:     This is 27 year old trans female with diagnosis of gender dysphoria.  The patient met WPATH and insurance criteria for gender affirming top surgery.  Patient wishes to undergo bilateral augmentation mammoplasties.  Based upon patient's anatomy and skin thickness, the breast implants will be placed in the prepectoral space space.      Risks were explained to the patient and they include but are not limited to: scarring, infection, potential explantation of one or both implants, bleeding, hematoma, seroma, temporary or permanent altered sensation on breast's skin and NAC (nipple areolar complex), implant malposition, breast asymmetry, asymmetry in the location of the IMF (inframammary fold), capsular contracture, rippling, animation deformity, the pneumothorax, chest tube placement, need for further surgeries.  Patient has acknowledged all these risks and has agreed to proceed signing informed consent.     Procedure:     Patient was identified in the  preoperative holding area and preoperative IV antibiotic was given to the patient.    I then proceeded to perform preoperative markings on the patient's chest.  First I draw the midline, I also marked 2 cm bilaterally and lateral to the midline on each medial aspect of patient's breast in order to make sure that the dissection on the breast pocket will stay 2 cm laterally from the midline on each side.  This will prevent symmastia.  Also, I marked the breast footprint on the anterior axillary line bilaterally in order to make sure that dissection of the breast pocket will not go beyond this line.  Furthermore, I marked the breast meridian on each breast as well as the native inframammary fold (IMF) of each breast.  I also measured the distance from the NAC to the IMF on each breast.     On this particular patient, the NAC to IMF distance on the right breast was  7 cm and said distance on the left breast was also 7 cm.  Therefore, there was no need to lower the inframammary folds.    I also marked the superior aspect of bilateral breast footprint.      This is how the preoperative markings looked like:                     After completing these markings, patient was then brought to the operating room and was placed supine on the operating room table.  Sequential compression devices were activated and general endotracheal anesthesia was obtained.  Then, the neck, chest and upper abdomen were prepped and draped in sterile surgical fashion.    With scalpel #15 I proceeded to make incision on the right breast IMF following the preoperative markings.  Subcutaneous tissues were divided with electrocautery until I reach the fascia of the pectoralis major muscle.  Utilizing light retractor, I proceeded to dissect the prepectoral pocket following the preoperative markings.      Copious irrigation with antibiotic solution was provided and I found that the hemostasis was excellent.  At this time I packed this space with a moist  sponge and proceeded to perform the above mentioned steps on the contralateral breast.     At this time I inspected the right breast and confirmed excellent hemostasis.  Utilizing patient's preoperative breast diameter, I brought a 450 ml full profile sizer which was introduced on this breast.  I also proceeded to remove the contralateral breast sponge and confirmed excellent hemostasis there as well.  I also introduced a second sizer of the same size on this breast.    Patient was then sat up and I confirmed excellent symmetry on both breasts and an harmonious relationship between the breast implant and patient's chest frame.  Therefore, I decided to utilize this volume for the permanent breast implants.    Patient was then placed back in the supine position, and with the sizers in place, I proceeded to apply the stitches to close the bilateral inframammary folds.  I applied three central interrupted stitches on the newly developed bilateral inframammary folds utilizing 2-0 PDS.  These stitches grasped the breast capsule and were anchored into the periosteum of the underlying rib.  This was very important to prevent implant malposition and bottoming out . I also applied multiple 3-0 Vicryl simple interrupted stitches next to the PDS stitches in order to bolster the closure.  The ends of each stitch was secured with multiple hemostats.    After all the stitches were placed, then the sizers were retrieved.  With this technique, I ensured that no needles will be in contact when the permanent implants are placed.     At this time, I proceeded to irrigate again both cavities with antibiotic solution followed by full-strength chlorhexidine solution.  I also proceeded to repaint the chest with full-strength chlorhexidine solution and applied new sterile towels on the field.  Our scrub tech, my assistants and I proceeded to change to new gloves and utilizing a Alvarez funnel, the permanent breast implants which were bathed  "in antibiotic solution as well as chlorhexidine, were inserted in bilateral breast pockets with the \"no touch\" technique.     All the previously applied stitches were then tied down and bilateral IMF were closed.  Subcutaneous tissues were approximated with multiple 4-0 Monocryl buried interrupted stitches.  Finally the skin was closed with 5-0 Monocryl running subcuticular stitches.    Bacitracin ointment and Xeroform strip gauze strip were applied on top of each incision, followed by Mastisol and a medium size Tegaderm as dressing.  Patient also received a supporting sports bra.    Instrument count was reported by nursing personnel as correct.  Patient tolerated procedure well and was extubated and transferred to recovery room awake and in stable condition.    Gareth Harrell MD , FACS   Diplomate American Board of Plastic Surgery  Diplomate American Board of Surgery  Adj. Assistant Professor of Surgery  Division of Plastic & Reconstructive Surgery   [facility]  Office: [phone]   5/9/2023 at 11:01 PM       Total  "

## 2023-05-11 ENCOUNTER — OFFICE VISIT (OUTPATIENT)
Dept: PLASTIC SURGERY | Facility: AMBULATORY SURGERY CENTER | Age: 28
End: 2023-05-11
Payer: COMMERCIAL

## 2023-05-11 DIAGNOSIS — Z98.82 STATUS POST BREAST AUGMENTATION: Primary | ICD-10-CM

## 2023-05-11 PROCEDURE — 99024 POSTOP FOLLOW-UP VISIT: CPT | Performed by: PLASTIC SURGERY

## 2023-05-11 NOTE — LETTER
5/11/2023         RE: Ilan Bazzi  [address]  [place] [address]        Dear Colleague,    Thank you for referring your patient, Ilan Bazzi, to the [facility] [place]. Please see a copy of my visit note below.    Ilan is a 27 years old trans female status post bilateral augmentation mammoplasties. She is 2 days out from surgery.  Patient is doing well.    At the physical exam, incisions are healing well, no evidence of surgical site infection or wound dehiscence.  There is no evidence of hematoma or seroma.  Patient presents with good symmetry and shape bilaterally.    Plan: Continue with sports bra, patient may have showers and apply antibiotic ointment over the incisions, follow-up with me in 1 week for reevaluation.  Patient is doing well from plastic surgery standpoint.    Gareth Harrell MD , FACS   Diplomate American Board of Plastic Surgery  Diplomate American Board of Surgery  Adj. Assistant Professor of Surgery  Division of Plastic & Reconstructive Surgery   [facility]  Office: [phone]   5/11/2023 at 7:26 PM           Again, thank you for allowing me to participate in the care of your patient.        Sincerely,        Gareth Harrell MD

## 2023-05-12 NOTE — PROGRESS NOTES
Ilan is a 27 years old trans female status post bilateral augmentation mammoplasties. She is 2 days out from surgery.  Patient is doing well.    At the physical exam, incisions are healing well, no evidence of surgical site infection or wound dehiscence.  There is no evidence of hematoma or seroma.  Patient presents with good symmetry and shape bilaterally.    Plan: Continue with sports bra, patient may have showers and apply antibiotic ointment over the incisions, follow-up with me in 1 week for reevaluation.  Patient is doing well from plastic surgery standpoint.    Gareth Harrell MD , FACS   Diplomate American Board of Plastic Surgery  Diplomate American Board of Surgery  Adj. Assistant Professor of Surgery  Division of Plastic & Reconstructive Surgery   [facility]  Office: [phone]   5/11/2023 at 7:26 PM

## 2023-05-19 ENCOUNTER — OFFICE VISIT (OUTPATIENT)
Dept: PLASTIC SURGERY | Facility: AMBULATORY SURGERY CENTER | Age: 28
End: 2023-05-19
Payer: COMMERCIAL

## 2023-05-19 DIAGNOSIS — Z98.82 STATUS POST BREAST AUGMENTATION: Primary | ICD-10-CM

## 2023-05-19 PROCEDURE — 99024 POSTOP FOLLOW-UP VISIT: CPT | Performed by: PLASTIC SURGERY

## 2023-05-19 NOTE — PROGRESS NOTES
Ilan is a 27 years old patient is status post bilateral prepectoral augmentation mammoplasty.  She is 10 days out from surgery.  Patient returns for reevaluation.    At the physical exam, all incisions are healing well.  There is no evidence of dehiscence or surgical site infection.  Good symmetry and shape bilaterally.  There is no evidence of hematoma or seroma.    Plan: Avoid heavy lifting for the next 4 weeks.  Continue utilizing sports bra, apply cocoa butter lotion over the incision and follow-up with me in 3 weeks.  Patient is doing well from plastic surgery standpoint.    Gareth Harrell MD , FACS   Diplomate American Board of Plastic Surgery  Diplomate American Board of Surgery  Adj. Assistant Professor of Surgery  Division of Plastic & Reconstructive Surgery   [facility]  Office: [phone]   5/19/2023 at 9:09 AM

## 2023-05-19 NOTE — LETTER
5/19/2023         RE: Ilan Bazzi  [address]  [place] [address]        Dear Colleague,    Thank you for referring your patient, Ilan Bazzi, to the [facility] [place]. Please see a copy of my visit note below.    Ilan is a 27 years old patient is status post bilateral prepectoral augmentation mammoplasty.  She is 10 days out from surgery.  Patient returns for reevaluation.    At the physical exam, all incisions are healing well.  There is no evidence of dehiscence or surgical site infection.  Good symmetry and shape bilaterally.  There is no evidence of hematoma or seroma.    Plan: Avoid heavy lifting for the next 4 weeks.  Continue utilizing sports bra, apply cocoa butter lotion over the incision and follow-up with me in 3 weeks.  Patient is doing well from plastic surgery standpoint.    Gareth Harrell MD , FACS   Diplomate American Board of Plastic Surgery  Diplomate American Board of Surgery  Adj. Assistant Professor of Surgery  Division of Plastic & Reconstructive Surgery   [facility]  Office: [phone]   5/19/2023 at 9:09 AM         Again, thank you for allowing me to participate in the care of your patient.        Sincerely,        Gareth Harrell MD

## 2023-05-25 ENCOUNTER — VIRTUAL VISIT (OUTPATIENT)
Dept: PSYCHOLOGY | Facility: CLINIC | Age: 28
End: 2023-05-25
Payer: COMMERCIAL

## 2023-05-25 DIAGNOSIS — F41.1 GENERALIZED ANXIETY DISORDER: Primary | ICD-10-CM

## 2023-05-25 DIAGNOSIS — F33.41 RECURRENT MAJOR DEPRESSIVE DISORDER, IN PARTIAL REMISSION (H): ICD-10-CM

## 2023-05-25 DIAGNOSIS — N52.2 DRUG-INDUCED ERECTILE DYSFUNCTION: ICD-10-CM

## 2023-05-25 PROCEDURE — 90837 PSYTX W PT 60 MINUTES: CPT | Mod: VID | Performed by: MARRIAGE & FAMILY THERAPIST

## 2023-05-25 NOTE — NURSING NOTE
Is the patient currently in the state of [place]? YES    Visit mode:VIDEO    If the visit is dropped, the patient can be reconnected by: VIDEO VISIT: Text to cell phone:   Telephone Information:   Mobile [phone]       Will anyone else be joining the visit? No  (If patient encounters technical issues they should call [phone])    How would you like to obtain your AVS? MyChart    Are changes needed to the allergy or medication list? N/A    Rooming Documentation: Questionnaire(s) not done per department protocol.    Reason for visit: SAMMY Donis

## 2023-05-25 NOTE — PROGRESS NOTES
Virtual Visit Details    Type of service:  Video Visit    Originating Location (pt. Location): Home    Distant Location (provider location):  Off-site  Platform used for Video Visit: [facility] - Progress Note    Date of Service: 23   Name: Ilan Bazzi  : 1995  Medical Record Number: [id number]  Treating Provider: PARISH Casiano, GLYNN  Type of Session: Individual  Present in Session: pt  Session Start and Stop Time: 0016-6791  Number of Minutes:  56    SERVICE MODALITY:  Video Visit:      Provider verified identity through the following two step process.  Patient provided:  Patient is known previously to provider    Telemedicine Visit: The patient's condition can be safely assessed and treated via synchronous audio and visual telemedicine encounter.      Reason for Telemedicine Visit: Services only offered telehealth    Originating Site (Patient Location): Patient's home    Distant Site (Provider Location): [facility]    Consent:  The patient/guardian has verbally consented to: the potential risks and benefits of telemedicine (video visit) versus in person care; bill my insurance or make self-payment for services provided; and responsibility for payment of non-covered services.     Patient would like the video invitation sent by:  My Chart    Mode of Communication:  Video Conference via [facility]    Distant Location (Provider):  Off-site    As the provider I attest to compliance with applicable laws and regulations related to telemedicine.    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  302.85 (F64.1) Gender dysphoria in Adolescents and Adults  Posttransition  ADHD by history   Provisional Diagnostic Hypothesis (Explain  R/O, other Provisional Diagnosis, and why alternative Diagnosis that were considered were ruled out): NA      Current Reported Symptoms and Status update:  Changes since last session-had top surgery is recovery well  Pt wants to increase boundaries around sexual expression   Pt wants to integrate sexual identity and expression with partners       Progress Toward Treatment Goals:   Satisfactory progress     Therapeutic Interventions/Treatment Strategies:    Area(s) of treatment focus addressed in this session included Symptom Management, Interpersonal Relationship Skills and Sexual Health and Wellness    Psychotherapist offered support, feedback and validation and reinforced use of skills Treatment modalities used include Motivational Interviewing Sex Therapy Interpersonal Behavioral Activation: Reinforced benefits/challenges of change process through applying skills to replace unwanted behaviors and Encouraged strategies to reduce individual procrastination and increase motivation by increasing goal-directed activities to enhance mood and reduce symptoms. and discussed application of self compassion, explored challenging unrealistic expectations of self/others and explored comfort with sexual communication  Support, Feedback and Problem Solving    Patient Response:   Patient responded to session by accepting feedback and accepting support  Possible barriers to participation / learning include: N/A    Current Mental Status Exam:   Appearance:  Appropriate   Eye Contact:  Good   Attitude / Demeanor: Cooperative   Speech      Rate / Production: Normal/ Responsive Talkative      Volume:  Normal  volume  Orientation:  All  Mood:   Normal  Affect:   Appropriate   Thought Content: Clear   Insight:   Good       Plan/Need for Future Services:  Return for therapy in 2 weeks to treat diagnosed problems.    Patient has a current master individualized treatment plan.  See Epic treatment plan for more information.    Referral /  Collaboration:  Referral to another professional/service is not indicated at this time..  Emergency Services Needed?  No    Assignment:  Will send list of adhd therapists  Continue w neuroplasticity exercises      Interactive Complexity:  There are four specific communication difficulties that complicate the work of the primary psychiatric procedure.  Interactive complexity (+94202) may be reported when at least one of these difficulties is present.    Communication difficulties present during current the psychiatric procedure include:  1. None.      Signature/Title:    Eros Coppola, PARISH, [facility]

## 2023-06-08 ENCOUNTER — OFFICE VISIT (OUTPATIENT)
Dept: PLASTIC SURGERY | Facility: AMBULATORY SURGERY CENTER | Age: 28
End: 2023-06-08
Payer: COMMERCIAL

## 2023-06-08 ENCOUNTER — MYC MEDICAL ADVICE (OUTPATIENT)
Dept: FAMILY MEDICINE | Facility: CLINIC | Age: 28
End: 2023-06-08

## 2023-06-08 DIAGNOSIS — F90.9 ATTENTION DEFICIT HYPERACTIVITY DISORDER (ADHD), UNSPECIFIED ADHD TYPE: ICD-10-CM

## 2023-06-08 DIAGNOSIS — Z98.82 STATUS POST BREAST AUGMENTATION: Primary | ICD-10-CM

## 2023-06-08 PROCEDURE — 99024 POSTOP FOLLOW-UP VISIT: CPT | Performed by: PLASTIC SURGERY

## 2023-06-08 RX ORDER — DEXTROAMPHETAMINE SACCHARATE, AMPHETAMINE ASPARTATE, DEXTROAMPHETAMINE SULFATE AND AMPHETAMINE SULFATE 2.5; 2.5; 2.5; 2.5 MG/1; MG/1; MG/1; MG/1
10 TABLET ORAL
Qty: 30 TABLET | Refills: 0 | Status: SHIPPED | OUTPATIENT
Start: 2023-06-08 | End: 2023-07-11

## 2023-06-08 RX ORDER — DEXTROAMPHETAMINE SACCHARATE, AMPHETAMINE ASPARTATE MONOHYDRATE, DEXTROAMPHETAMINE SULFATE AND AMPHETAMINE SULFATE 7.5; 7.5; 7.5; 7.5 MG/1; MG/1; MG/1; MG/1
30 CAPSULE, EXTENDED RELEASE ORAL DAILY
Qty: 30 CAPSULE | Refills: 0 | Status: SHIPPED | OUTPATIENT
Start: 2023-06-08 | End: 2023-07-11

## 2023-06-08 NOTE — LETTER
6/8/2023         RE: Ilan Bazzi  [address]  [place] [address]        Dear Colleague,    Thank you for referring your patient, Ilan Bazzi, to the [facility] [place]. Please see a copy of my visit note below.    Ilan is a 27 years old trans female status post bilateral augmentation mammoplasty.  She is 4 weeks out from surgery.  Patient is feeling well.    At the physical exam, patient present with good symmetry and shape bilaterally.  No implant malposition, no late seroma or hematoma.  Inframammary fold incisions healing well.  No evidence of keloid or hypertrophic scarring.    Plan: Continue application of cocoa butter for scar massage, sports bra, no heavy lifting for another 2 weeks and return to see me in 3 months for possible final visit.  Patient is doing well from plastic surgery standpoint.    Gareth Harrell MD , FACS   Diplomate American Board of Plastic Surgery  Diplomate American Board of Surgery  Adj. Assistant Professor of Surgery  Division of Plastic & Reconstructive Surgery   [facility]  Office: [phone]   6/8/2023 at 2:44 PM         Again, thank you for allowing me to participate in the care of your patient.        Sincerely,        Gareth Harrell MD

## 2023-06-08 NOTE — TELEPHONE ENCOUNTER
"Last seen 5/1/23    Request for medication refill:  amphetamine-dextroamphetamine (ADDERALL XR) 30 MG 24 hr capsule  amphetamine-dextroamphetamine (ADDERALL) 10 MG tablet  Providers if patient needs an appointment and you are willing to give a one month supply please refill for one month and  send a letter/MyChart using \".SMILLIMITEDREFILL\" .smillimited and route chart to \"P [facility] \" (Giving one month refill in non controlled medications is strongly recommended before denial)    If refill has been denied, meaning absolutely no refills without visit, please complete the smart phrase \".smirxrefuse\" and route it to the \"P SMI MED REFILLS\"  pool to inform the patient and the pharmacy.    Janis Calderón RN        "

## 2023-06-08 NOTE — PROGRESS NOTES
Ilan is a 27 years old trans female status post bilateral augmentation mammoplasty.  She is 4 weeks out from surgery.  Patient is feeling well.    At the physical exam, patient present with good symmetry and shape bilaterally.  No implant malposition, no late seroma or hematoma.  Inframammary fold incisions healing well.  No evidence of keloid or hypertrophic scarring.    Plan: Continue application of cocoa butter for scar massage, sports bra, no heavy lifting for another 2 weeks and return to see me in 3 months for possible final visit.  Patient is doing well from plastic surgery standpoint.    Gareth Harrell MD , FACS   Diplomate American Board of Plastic Surgery  Diplomate American Board of Surgery  Adj. Assistant Professor of Surgery  Division of Plastic & Reconstructive Surgery   [facility]  Office: [phone]   6/8/2023 at 2:44 PM

## 2023-06-15 ENCOUNTER — VIRTUAL VISIT (OUTPATIENT)
Dept: PSYCHOLOGY | Facility: CLINIC | Age: 28
End: 2023-06-15
Payer: COMMERCIAL

## 2023-06-15 DIAGNOSIS — N52.2 DRUG-INDUCED ERECTILE DYSFUNCTION: ICD-10-CM

## 2023-06-15 DIAGNOSIS — F90.2 ATTENTION DEFICIT HYPERACTIVITY DISORDER (ADHD), COMBINED TYPE: ICD-10-CM

## 2023-06-15 DIAGNOSIS — F41.1 GENERALIZED ANXIETY DISORDER: Primary | ICD-10-CM

## 2023-06-15 DIAGNOSIS — F33.41 RECURRENT MAJOR DEPRESSIVE DISORDER, IN PARTIAL REMISSION (H): ICD-10-CM

## 2023-06-15 PROCEDURE — 90837 PSYTX W PT 60 MINUTES: CPT | Mod: VID | Performed by: MARRIAGE & FAMILY THERAPIST

## 2023-06-15 ASSESSMENT — PATIENT HEALTH QUESTIONNAIRE - PHQ9
10. IF YOU CHECKED OFF ANY PROBLEMS, HOW DIFFICULT HAVE THESE PROBLEMS MADE IT FOR YOU TO DO YOUR WORK, TAKE CARE OF THINGS AT HOME, OR GET ALONG WITH OTHER PEOPLE: VERY DIFFICULT
SUM OF ALL RESPONSES TO PHQ QUESTIONS 1-9: 10
SUM OF ALL RESPONSES TO PHQ QUESTIONS 1-9: 10

## 2023-06-15 NOTE — NURSING NOTE
Is the patient currently in the state of [place]? YES    Visit mode:VIDEO    If the visit is dropped, the patient can be reconnected by: VIDEO VISIT: Text to cell phone:   Telephone Information:   Mobile [phone]       Will anyone else be joining the visit? No  (If patient encounters technical issues they should call [phone])    How would you like to obtain your AVS? MyChart    Are changes needed to the allergy or medication list? N/A    Rooming Documentation: Questionnaire(s) not done per department protocol.    Reason for visit: SAMMY Morgan

## 2023-06-15 NOTE — PROGRESS NOTES
[facility] - Progress Note    Date of Service: 6/15/23   Name: Ilan Bazzi  : 1995  Medical Record Number: [id number]  Treating Provider: PARISH Casiano LADC  Type of Session: Individual  Present in Session: pt  Session Start and Stop Time: 1398-3552  Number of Minutes:  57    SERVICE MODALITY:  Video Visit:      Provider verified identity through the following two step process.  Patient provided:  Patient is known previously to provider    Telemedicine Visit: The patient's condition can be safely assessed and treated via synchronous audio and visual telemedicine encounter.      Reason for Telemedicine Visit: Services only offered telehealth    Originating Site (Patient Location): Patient's home    Distant Site (Provider Location): [facility]    Consent:  The patient/guardian has verbally consented to: the potential risks and benefits of telemedicine (video visit) versus in person care; bill my insurance or make self-payment for services provided; and responsibility for payment of non-covered services.     Patient would like the video invitation sent by:  My Chart    Mode of Communication:  Video Conference via [facility]    Distant Location (Provider):  Off-site    As the provider I attest to compliance with applicable laws and regulations related to telemedicine.    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  302.85 (F64.1) Gender dysphoria in Adolescents and Adults  Posttransition  ADHD by history   Provisional Diagnostic Hypothesis (Explain R/O, other Provisional Diagnosis, and why alternative Diagnosis that were considered were ruled out): NA        Current Reported Symptoms and Status update:  Changes since last session  recovering well is able to exercise again  Pt wants to increase boundaries around sexual expression   Pt wants to integrate sexual identity and expression with partners   Progress Toward Treatment Goals:   Satisfactory progress     Therapeutic Interventions/Treatment Strategies:    Area(s) of treatment focus addressed in this session included Symptom Management, Interpersonal Relationship Skills and Sexual Health and Wellness    Going back to work in July for full hours    Psychotherapist offered support, feedback and validation and reinforced use of skills Treatment modalities used include Motivational Interviewing Cognitive Behavioral Therapy Interpersonal Behavioral Activation: Reinforced benefits/challenges of change process through applying skills to replace unwanted behaviors and Encouraged strategies to reduce individual procrastination and increase motivation by increasing goal-directed activities to enhance mood and reduce symptoms., Coping Skills: Assisted patient in understanding the purpose of planning / creating / participating / sharing in positive experiences and Facilitated understanding of  what factors may contribute to symptom relapse and skills plan to manage symptom relapse  and Relationship Skills: Discussed strategies to promote healthier understanding of interpersonal relationships  Support, Feedback, Problem Solving and Education    Patient Response:   Patient responded to session by accepting feedback, listening, focusing on goals and accepting support  Possible barriers to participation / learning include: N/A    Current Mental Status Exam:   Appearance:  Appropriate   Eye Contact:  Good   Attitude / Demeanor: Cooperative   Speech      Rate / Production: Normal/ Responsive Talkative      Volume:  Normal  volume  Orientation:  All  Mood:   Normal Euthymic  Affect:   Appropriate   Thought Content: Clear   Insight:   Good       Plan/Need for Future Services:  Return for therapy in 2 weeks to  treat diagnosed problems.    Patient has a current master individualized treatment plan.  See Epic treatment plan for more information.    Referral / Collaboration:  Referral to another professional/service is not indicated at this time..  Emergency Services Needed?  No    Assignment:  Work on finding the josey in art work again-exploring sched for drawing and creativity     Interactive Complexity:  There are four specific communication difficulties that complicate the work of the primary psychiatric procedure.  Interactive complexity (+72775) may be reported when at least one of these difficulties is present.    Communication difficulties present during current the psychiatric procedure include:  1. None.      Signature/Title:    PARISH Casiano, [facility]    Virtual Visit Details    Type of service:  Video Visit    Originating Location (pt. Location): Home    Distant Location (provider location):  On-site  Platform used for Video Visit: Julian

## 2023-06-29 ENCOUNTER — VIRTUAL VISIT (OUTPATIENT)
Dept: PSYCHOLOGY | Facility: CLINIC | Age: 28
End: 2023-06-29
Payer: COMMERCIAL

## 2023-06-29 DIAGNOSIS — F33.41 RECURRENT MAJOR DEPRESSIVE DISORDER, IN PARTIAL REMISSION (H): ICD-10-CM

## 2023-06-29 DIAGNOSIS — N52.2 DRUG-INDUCED ERECTILE DYSFUNCTION: ICD-10-CM

## 2023-06-29 DIAGNOSIS — F41.1 GENERALIZED ANXIETY DISORDER: Primary | ICD-10-CM

## 2023-06-29 DIAGNOSIS — F90.2 ATTENTION DEFICIT HYPERACTIVITY DISORDER (ADHD), COMBINED TYPE: ICD-10-CM

## 2023-06-29 PROCEDURE — 90837 PSYTX W PT 60 MINUTES: CPT | Mod: VID | Performed by: MARRIAGE & FAMILY THERAPIST

## 2023-06-29 NOTE — PROGRESS NOTES
Virtual Visit Details    Type of service:  Video Visit     Originating Location (pt. Location): Home    Distant Location (provider location):  Off-site  Platform used for Video Visit: [facility] - Progress Note    Date of Service: 23   Name: Ilan Bazzi  : 1995  Medical Record Number: [id number]  Treating Provider: PARISH Casiano, GLYNN  Type of Session: Individual  Present in Session: pt  Session Start and Stop Time: 9258-2599  Number of Minutes:  55    SERVICE MODALITY:  Video Visit:      Provider verified identity through the following two step process.  Patient provided:  Patient is known previously to provider    Telemedicine Visit: The patient's condition can be safely assessed and treated via synchronous audio and visual telemedicine encounter.      Reason for Telemedicine Visit: Services only offered telehealth    Originating Site (Patient Location): Patient's home    Distant Site (Provider Location): [facility]    Consent:  The patient/guardian has verbally consented to: the potential risks and benefits of telemedicine (video visit) versus in person care; bill my insurance or make self-payment for services provided; and responsibility for payment of non-covered services.     Patient would like the video invitation sent by:  My Chart    Mode of Communication:  Video Conference via [facility]    Distant Location (Provider):  Off-site    As the provider I attest to compliance with applicable laws and regulations related to telemedicine.    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  302.85 (F64.1) Gender dysphoria in Adolescents and Adults  Posttransition  ADHD by history   Provisional Diagnostic Hypothesis (Explain  R/O, other Provisional Diagnosis, and why alternative Diagnosis that were considered were ruled out): NA      Current Reported Symptoms and Status update:  Changes since last session-hasnt been super productive which is frustrating  Pt wants to increase boundaries around sexual expression   Pt wants to integrate sexual identity and expression with partners       Progress Toward Treatment Goals:   Satisfactory progress     Therapeutic Interventions/Treatment Strategies:    Area(s) of treatment focus addressed in this session included Symptom Management, Interpersonal Relationship Skills, Sexual Health and Wellness and Gender Health    Assignment:  Work on finding the josey in art work again-exploring sched for drawing and creativity     Struggling with productive tasks-      Psychotherapist offered support, feedback and validation and reinforced use of skills Treatment modalities used include Motivational Interviewing Cognitive Behavioral Therapy Sex Therapy Interpersonal Behavioral Activation: Encouraged strategies to reduce individual procrastination and increase motivation by increasing goal-directed activities to enhance mood and reduce symptoms., Cognitive Restructuring:  Assisted patient in formulating new neutral/positive alternatives to challenge less helpful / ineffective thoughts and discussed application of self compassion and explored challenging unrealistic expectations of self/others  Support, Feedback, Structured Activity, Problem Solving and Clarification    Patient Response:   Patient responded to session by accepting feedback, listening, focusing on goals and accepting support  Possible barriers to participation / learning include: N/A    Current Mental Status Exam:   Appearance:  Appropriate   Eye Contact:  Good   Attitude / Demeanor: Cooperative   Speech      Rate / Production: Normal/ Responsive Talkative      Volume:  Normal  volume  Orientation:  All  Mood:   Anxious   Affect:   Appropriate    Thought Content: Clear   Insight:   Good       Plan/Need for Future Services:  Return for therapy in 2 weeks to treat diagnosed problems.    Patient has a current master individualized treatment plan.  See Epic treatment plan for more information.    Referral / Collaboration:  Referral to another professional/service is not indicated at this time..  Emergency Services Needed?  No    Assignment:  Work on challenging thoughts in the moment    Interactive Complexity:  There are four specific communication difficulties that complicate the work of the primary psychiatric procedure.  Interactive complexity (+73915) may be reported when at least one of these difficulties is present.    Communication difficulties present during current the psychiatric procedure include:  1. None.      Signature/Title:    Eros Coppola, LMFT, [facility]

## 2023-06-29 NOTE — NURSING NOTE
Is the patient currently in the state of [place]? YES    Visit mode:VIDEO    If the visit is dropped, the patient can be reconnected by: VIDEO VISIT:  Send e-mail to at [email or web address]    Will anyone else be joining the visit? No  (If patient encounters technical issues they should call [phone])    How would you like to obtain your AVS? MyChart    Are changes needed to the allergy or medication list? N/A    Rooming Documentation: Questionnaire(s) not done per department protocol.    Reason for visit: RECHSAMMY Roldan

## 2023-07-11 ENCOUNTER — MYC MEDICAL ADVICE (OUTPATIENT)
Dept: FAMILY MEDICINE | Facility: CLINIC | Age: 28
End: 2023-07-11
Payer: COMMERCIAL

## 2023-07-11 DIAGNOSIS — F90.9 ATTENTION DEFICIT HYPERACTIVITY DISORDER (ADHD), UNSPECIFIED ADHD TYPE: ICD-10-CM

## 2023-07-11 RX ORDER — DEXTROAMPHETAMINE SACCHARATE, AMPHETAMINE ASPARTATE, DEXTROAMPHETAMINE SULFATE AND AMPHETAMINE SULFATE 2.5; 2.5; 2.5; 2.5 MG/1; MG/1; MG/1; MG/1
10 TABLET ORAL
Qty: 30 TABLET | Refills: 0 | Status: SHIPPED | OUTPATIENT
Start: 2023-07-11 | End: 2023-08-11

## 2023-07-11 RX ORDER — DEXTROAMPHETAMINE SACCHARATE, AMPHETAMINE ASPARTATE MONOHYDRATE, DEXTROAMPHETAMINE SULFATE AND AMPHETAMINE SULFATE 7.5; 7.5; 7.5; 7.5 MG/1; MG/1; MG/1; MG/1
30 CAPSULE, EXTENDED RELEASE ORAL DAILY
Qty: 30 CAPSULE | Refills: 0 | Status: SHIPPED | OUTPATIENT
Start: 2023-07-11 | End: 2023-08-11

## 2023-07-11 NOTE — TELEPHONE ENCOUNTER
"Request for medication refill:  amphetamine-dextroamphetamine (ADDERALL XR) 30 MG 24 hr capsule  amphetamine-dextroamphetamine (ADDERALL) 10 MG tablet  Providers if patient needs an appointment and you are willing to give a one month supply please refill for one month and  send a letter/MyChart using \".SMILLIMITEDREFILL\" .smillimited and route chart to \"P [facility] \" (Giving one month refill in non controlled medications is strongly recommended before denial)    If refill has been denied, meaning absolutely no refills without visit, please complete the smart phrase \".smirxrefuse\" and route it to the \"P SMI MED REFILLS\"  pool to inform the patient and the pharmacy.    Janis Calderón RN        "

## 2023-07-13 ENCOUNTER — VIRTUAL VISIT (OUTPATIENT)
Dept: PSYCHOLOGY | Facility: CLINIC | Age: 28
End: 2023-07-13
Payer: COMMERCIAL

## 2023-07-13 DIAGNOSIS — N52.2 DRUG-INDUCED ERECTILE DYSFUNCTION: ICD-10-CM

## 2023-07-13 DIAGNOSIS — F33.41 RECURRENT MAJOR DEPRESSIVE DISORDER, IN PARTIAL REMISSION (H): ICD-10-CM

## 2023-07-13 DIAGNOSIS — F32.A DEPRESSION, UNSPECIFIED DEPRESSION TYPE: ICD-10-CM

## 2023-07-13 DIAGNOSIS — F64.9 GENDER DYSPHORIA: Primary | ICD-10-CM

## 2023-07-13 DIAGNOSIS — F41.1 GENERALIZED ANXIETY DISORDER: Primary | ICD-10-CM

## 2023-07-13 DIAGNOSIS — F90.2 ATTENTION DEFICIT HYPERACTIVITY DISORDER (ADHD), COMBINED TYPE: ICD-10-CM

## 2023-07-13 PROCEDURE — 90837 PSYTX W PT 60 MINUTES: CPT | Mod: VID | Performed by: MARRIAGE & FAMILY THERAPIST

## 2023-07-13 NOTE — PROGRESS NOTES
[facility] - Progress Note    Date of Service: 23   Name: Ilan Bazzi  : 1995  Medical Record Number: [id number]  Treating Provider: PARISH Casiano LADC  Type of Session: Individual  Present in Session: pt  Session Start and Stop Time: 9776-8402  Number of Minutes:  55    SERVICE MODALITY:  Video Visit:      Provider verified identity through the following two step process.  Patient provided:  Patient is known previously to provider    Telemedicine Visit: The patient's condition can be safely assessed and treated via synchronous audio and visual telemedicine encounter.      Reason for Telemedicine Visit: Services only offered telehealth    Originating Site (Patient Location): Patient's home    Distant Site (Provider Location): [facility]    Consent:  The patient/guardian has verbally consented to: the potential risks and benefits of telemedicine (video visit) versus in person care; bill my insurance or make self-payment for services provided; and responsibility for payment of non-covered services.     Patient would like the video invitation sent by:  My Chart    Mode of Communication:  Video Conference via [facility]    Distant Location (Provider):  Off-site    As the provider I attest to compliance with applicable laws and regulations related to telemedicine.    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  302.85 (F64.1) Gender dysphoria in Adolescents and Adults  Posttransition  ADHD by history   Provisional Diagnostic Hypothesis (Explain R/O, other Provisional Diagnosis, and why alternative Diagnosis that were considered were ruled out): NA      Current Reported Symptoms and Status update:  Changes since last  session-increased depression symptoms has been working productivity   Pt wants to increase boundaries around sexual expression   Pt wants to integrate sexual identity and expression with partners       Progress Toward Treatment Goals:   Satisfactory progress     Therapeutic Interventions/Treatment Strategies:    Area(s) of treatment focus addressed in this session included Symptom Management, Interpersonal Relationship Skills and Sexual Health and Wellness    Assignment:  Work on challenging thoughts in the moment  Has been using visual timer-productivity dates with friends  MWF sit down w friend and work on stuff together  TTH do that w lenny   Finding josey w drawing again  Has been working on the need to do v s want to do   Friend asked her out      Psychotherapist offered support, feedback and validation and reinforced use of skills Treatment modalities used include Motivational Interviewing Dialectical Behavioral Therapy Sexual Health and Wellness Education Interpersonal Behavioral Activation: Reinforced benefits/challenges of change process through applying skills to replace unwanted behaviors and Encouraged strategies to reduce individual procrastination and increase motivation by increasing goal-directed activities to enhance mood and reduce symptoms., Coping Skills: Assisted patient in understanding the purpose of planning / creating / participating / sharing in positive experiences and Relationship Skills: Assisted patients in implementing more effective communication skills in their relationships  Support, Feedback, Structured Activity and Problem Solving    Patient Response:   Patient responded to session by accepting feedback, listening and accepting support  Possible barriers to participation / learning include: N/A    Current Mental Status Exam:   Appearance:  Appropriate   Eye Contact:  Good   Attitude / Demeanor: Cooperative   Speech      Rate / Production: Normal/ Responsive      Volume:  Normal   volume  Orientation:  All  Mood:   Depressed  Normal  Affect:   Appropriate   Thought Content: Clear   Insight:   Good       Plan/Need for Future Services:  Return for therapy in 2 weeks to treat diagnosed problems.    Patient has a current master individualized treatment plan.  See Epic treatment plan for more information.    Referral / Collaboration:  Referral to another professional/service is not indicated at this time..  Emergency Services Needed?  No    Assignment:  Keep up w schedule!  Enjoy all the things!    Interactive Complexity:  There are four specific communication difficulties that complicate the work of the primary psychiatric procedure.  Interactive complexity (+10222) may be reported when at least one of these difficulties is present.    Communication difficulties present during current the psychiatric procedure include:  1. None.      Signature/Title:    PARISH Casiano, [facility]    Virtual Visit Details    Type of service:  Video Visit     Originating Location (pt. Location): Home    Distant Location (provider location):  Off-site  Platform used for Video Visit: Julian

## 2023-07-13 NOTE — TELEPHONE ENCOUNTER
"Last visit 5/1/23    Request for medication refill:  estradiol (ESTRACE) 2 MG tablet  venlafaxine (EFFEXOR XR) 75 MG 24 hr capsule  Providers if patient needs an appointment and you are willing to give a one month supply please refill for one month and  send a letter/MyChart using \".SMILLIMITEDREFILL\" .smillimited and route chart to \"[facility] \" (Giving one month refill in non controlled medications is strongly recommended before denial)    If refill has been denied, meaning absolutely no refills without visit, please complete the smart phrase \".smirxrefuse\" and route it to the \"[facility] MED REFILLS\"  pool to inform the patient and the pharmacy.    Raghu Cisneros MA        "

## 2023-07-13 NOTE — NURSING NOTE
Is the patient currently in the state of [place]? YES    Visit mode:VIDEO    If the visit is dropped, the patient can be reconnected by: VIDEO VISIT: Text to cell phone:   Telephone Information:   Mobile [phone]       Will anyone else be joining the visit? No  (If patient encounters technical issues they should call [phone])    How would you like to obtain your AVS? MyChart    Are changes needed to the allergy or medication list? N/A    Rooming Documentation: Questionnaire(s) not done per department protocol.    Reason for visit: SAMMY Morgan

## 2023-07-14 ENCOUNTER — LAB (OUTPATIENT)
Dept: LAB | Facility: CLINIC | Age: 28
End: 2023-07-14
Payer: COMMERCIAL

## 2023-07-14 DIAGNOSIS — F32.A DEPRESSION, UNSPECIFIED DEPRESSION TYPE: ICD-10-CM

## 2023-07-14 DIAGNOSIS — Z11.3 ROUTINE SCREENING FOR STI (SEXUALLY TRANSMITTED INFECTION): ICD-10-CM

## 2023-07-14 LAB — T PALLIDUM AB SER QL: NONREACTIVE

## 2023-07-14 PROCEDURE — 99000 SPECIMEN HANDLING OFFICE-LAB: CPT | Performed by: PATHOLOGY

## 2023-07-14 PROCEDURE — 36415 COLL VENOUS BLD VENIPUNCTURE: CPT | Performed by: PATHOLOGY

## 2023-07-14 PROCEDURE — 87389 HIV-1 AG W/HIV-1&-2 AB AG IA: CPT | Performed by: STUDENT IN AN ORGANIZED HEALTH CARE EDUCATION/TRAINING PROGRAM

## 2023-07-14 PROCEDURE — 86780 TREPONEMA PALLIDUM: CPT | Performed by: STUDENT IN AN ORGANIZED HEALTH CARE EDUCATION/TRAINING PROGRAM

## 2023-07-14 RX ORDER — VENLAFAXINE HYDROCHLORIDE 75 MG/1
75 CAPSULE, EXTENDED RELEASE ORAL DAILY
Qty: 30 CAPSULE | Refills: 11 | OUTPATIENT
Start: 2023-07-14

## 2023-07-14 RX ORDER — VENLAFAXINE HYDROCHLORIDE 150 MG/1
150 TABLET, EXTENDED RELEASE ORAL DAILY
OUTPATIENT
Start: 2023-07-14

## 2023-07-14 RX ORDER — ESTRADIOL 2 MG/1
4 TABLET ORAL 2 TIMES DAILY
Qty: 120 TABLET | Refills: 4 | Status: SHIPPED | OUTPATIENT
Start: 2023-07-14 | End: 2024-01-03

## 2023-07-14 RX ORDER — VENLAFAXINE HYDROCHLORIDE 150 MG/1
150 TABLET, EXTENDED RELEASE ORAL DAILY
Qty: 30 TABLET | Refills: 2 | Status: SHIPPED | OUTPATIENT
Start: 2023-07-14 | End: 2023-10-18

## 2023-07-14 NOTE — TELEPHONE ENCOUNTER
Dose change, new script for venlafaxine (EFFEXOR-ER) 150 MG 24 hr tablet sent 7/14/23. Denied, closing encounter.    Fatimah Morrow RN

## 2023-07-15 LAB — HIV 1+2 AB+HIV1 P24 AG SERPL QL IA: NONREACTIVE

## 2023-07-26 ENCOUNTER — OFFICE VISIT (OUTPATIENT)
Dept: FAMILY MEDICINE | Facility: CLINIC | Age: 28
End: 2023-07-26
Payer: COMMERCIAL

## 2023-07-26 VITALS
WEIGHT: 165.3 LBS | SYSTOLIC BLOOD PRESSURE: 131 MMHG | BODY MASS INDEX: 23.07 KG/M2 | HEART RATE: 113 BPM | OXYGEN SATURATION: 95 % | DIASTOLIC BLOOD PRESSURE: 87 MMHG | RESPIRATION RATE: 16 BRPM

## 2023-07-26 DIAGNOSIS — F64.9 GENDER DYSPHORIA: ICD-10-CM

## 2023-07-26 DIAGNOSIS — F90.9 ATTENTION DEFICIT HYPERACTIVITY DISORDER (ADHD), UNSPECIFIED ADHD TYPE: ICD-10-CM

## 2023-07-26 DIAGNOSIS — R00.0 TACHYCARDIA: ICD-10-CM

## 2023-07-26 DIAGNOSIS — R53.83 OTHER FATIGUE: ICD-10-CM

## 2023-07-26 DIAGNOSIS — F32.A DEPRESSION, UNSPECIFIED DEPRESSION TYPE: Primary | ICD-10-CM

## 2023-07-26 DIAGNOSIS — B07.0 PLANTAR WARTS: ICD-10-CM

## 2023-07-26 LAB — HGB BLD-MCNC: 15.3 G/DL (ref 11.7–17.7)

## 2023-07-26 PROCEDURE — 85018 HEMOGLOBIN: CPT | Performed by: STUDENT IN AN ORGANIZED HEALTH CARE EDUCATION/TRAINING PROGRAM

## 2023-07-26 PROCEDURE — 36415 COLL VENOUS BLD VENIPUNCTURE: CPT | Performed by: STUDENT IN AN ORGANIZED HEALTH CARE EDUCATION/TRAINING PROGRAM

## 2023-07-26 PROCEDURE — 82607 VITAMIN B-12: CPT | Performed by: STUDENT IN AN ORGANIZED HEALTH CARE EDUCATION/TRAINING PROGRAM

## 2023-07-26 PROCEDURE — 84403 ASSAY OF TOTAL TESTOSTERONE: CPT | Mod: KX | Performed by: STUDENT IN AN ORGANIZED HEALTH CARE EDUCATION/TRAINING PROGRAM

## 2023-07-26 PROCEDURE — 80061 LIPID PANEL: CPT | Performed by: STUDENT IN AN ORGANIZED HEALTH CARE EDUCATION/TRAINING PROGRAM

## 2023-07-26 PROCEDURE — 80053 COMPREHEN METABOLIC PANEL: CPT | Performed by: STUDENT IN AN ORGANIZED HEALTH CARE EDUCATION/TRAINING PROGRAM

## 2023-07-26 PROCEDURE — 93000 ELECTROCARDIOGRAM COMPLETE: CPT | Performed by: STUDENT IN AN ORGANIZED HEALTH CARE EDUCATION/TRAINING PROGRAM

## 2023-07-26 PROCEDURE — 99214 OFFICE O/P EST MOD 30 MIN: CPT | Performed by: STUDENT IN AN ORGANIZED HEALTH CARE EDUCATION/TRAINING PROGRAM

## 2023-07-26 PROCEDURE — 82728 ASSAY OF FERRITIN: CPT | Performed by: STUDENT IN AN ORGANIZED HEALTH CARE EDUCATION/TRAINING PROGRAM

## 2023-07-26 PROCEDURE — 82306 VITAMIN D 25 HYDROXY: CPT | Performed by: STUDENT IN AN ORGANIZED HEALTH CARE EDUCATION/TRAINING PROGRAM

## 2023-07-26 PROCEDURE — 82670 ASSAY OF TOTAL ESTRADIOL: CPT | Mod: KX | Performed by: STUDENT IN AN ORGANIZED HEALTH CARE EDUCATION/TRAINING PROGRAM

## 2023-07-26 PROCEDURE — 84443 ASSAY THYROID STIM HORMONE: CPT | Performed by: STUDENT IN AN ORGANIZED HEALTH CARE EDUCATION/TRAINING PROGRAM

## 2023-07-26 NOTE — PROGRESS NOTES
Assessment & Plan     Depression, unspecified depression type  Doing better on increased Effexor dose.  No changes at this time    Attention deficit hyperactivity disorder (ADHD), unspecified ADHD type  Having difficulty with duration of medication effect.  Has had varying difficulties with side effects from IR or ER in the past.  She is working with a therapist specifically on ADHD.  We will continue to monitor and consider adjustment if worsening symptom control    Tachycardia  Persistent sinus tachycardia in the 1 teens to 120s noted on past visits.  Patient sometimes feels palpitations.  Interestingly, on EKG heart rate in the 70s.  Normal sinus rhythm.  Patient describes breathing techniques to lower her heart rate but she used right before the EKG was being taken.  No concern for arrhythmia  - EKG 12-lead complete w/read - Clinics    Other fatigue  - TSH with free T4 reflex  - Ferritin  - Vitamin D deficiency screening  - Vitamin B12  - TSH with free T4 reflex  - Ferritin  - Vitamin D deficiency screening  - Vitamin B12    Gender dysphoria  Due for routine lab monitoring.  Feminizing HRT going well, no dose changes unless concerns on labs  - Hemoglobin  - Comprehensive metabolic panel  - Lipid panel reflex to direct LDL Fasting  - Estradiol  - Testosterone total  - Hemoglobin  - Comprehensive metabolic panel  - Lipid panel reflex to direct LDL Fasting  - Estradiol  - Testosterone total    Plantar warts  Discussed over-the-counter treatment, return for cryotherapy if not improving      Diagnosis or treatment significantly limited by social determinants of health - chronic mental health conditions, gender identity put patient at risk for harm from healthcare system  Ordering of each unique test  Prescription drug management  24 minutes spent by me on the date of the encounter doing chart review, history and exam, documentation and further activities per the note       Return in about 3 months (around  10/26/2023).    DO TANIA Snowden [facility] TERRANCE Talavera is a 27 year old, presenting for the following health issues:  Wart (Pt has a wart on her foot.)        7/26/2023     2:12 PM   Additional Questions   Roomed by Raghu   Accompanied by Self         7/26/2023     2:12 PM   Patient Reported Additional Medications   Patient reports taking the following new medications n/a       HPI     Wart on R foot     genital thing went away   Upped dosage so far so good   Building more structure into her life - more helpful in being productive     ADHD  Doesn't feel like long release actually lasts longer   More like 4 or 5   Once fully online, can get some stuff done for a littl ewhile  In betweens are least productive (from nothing to max med dose) - wants to take a nap     Past was on IR BID and had a low point in the middle of the day. ER prior had caused appetite suppression     Dry throat, dry chapped lips. Sometimes worries that body doesn't retain water as well as it should         Review of Systems   Pertinent positives and negatives per HPI.        Objective    /87 (BP Location: Right arm, Patient Position: Sitting, Cuff Size: Adult Regular)   Pulse 113   Resp 16   Wt 75 kg (165 lb 4.8 oz)   SpO2 95%   BMI 23.07 kg/m    Body mass index is 23.07 kg/m .  Physical Exam   GENERAL: healthy, alert and no distress  NECK: no adenopathy, no asymmetry, masses, or scars and thyroid normal to palpation  RESP: lungs clear to auscultation - no rales, rhonchi or wheezes  CV: regular rate and rhythm, normal S1 S2, no S3 or S4, no murmur, click or rub, no peripheral edema and peripheral pulses strong  MS: no gross musculoskeletal defects noted, no edema    EKG - Reviewed and interpreted by me Normal Sinus Rhythm, normal axis, normal intervals, no acute ST/T changes c/w ischemia, no LVH by voltage criteria

## 2023-07-27 LAB
ALBUMIN SERPL BCG-MCNC: 4.8 G/DL (ref 3.5–5.2)
ALP SERPL-CCNC: 39 U/L (ref 35–129)
ALT SERPL W P-5'-P-CCNC: 14 U/L (ref 0–70)
ANION GAP SERPL CALCULATED.3IONS-SCNC: 11 MMOL/L (ref 7–15)
AST SERPL W P-5'-P-CCNC: 30 U/L (ref 0–45)
BILIRUB SERPL-MCNC: 0.2 MG/DL
BUN SERPL-MCNC: 16.9 MG/DL (ref 6–20)
CALCIUM SERPL-MCNC: 9.4 MG/DL (ref 8.6–10)
CHLORIDE SERPL-SCNC: 103 MMOL/L (ref 98–107)
CHOLEST SERPL-MCNC: 197 MG/DL
CREAT SERPL-MCNC: 0.97 MG/DL (ref 0.51–1.17)
DEPRECATED CALCIDIOL+CALCIFEROL SERPL-MC: 51 UG/L (ref 20–75)
DEPRECATED HCO3 PLAS-SCNC: 26 MMOL/L (ref 22–29)
ESTRADIOL SERPL-MCNC: 116 PG/ML
FERRITIN SERPL-MCNC: 60 NG/ML (ref 6–409)
GFR SERPL CREATININE-BSD FRML MDRD: 81 ML/MIN/1.73M2
GLUCOSE SERPL-MCNC: 78 MG/DL (ref 70–99)
HDLC SERPL-MCNC: 58 MG/DL
LDLC SERPL CALC-MCNC: 124 MG/DL
NONHDLC SERPL-MCNC: 139 MG/DL
POTASSIUM SERPL-SCNC: 4.5 MMOL/L (ref 3.4–5.3)
PROT SERPL-MCNC: 7.6 G/DL (ref 6.4–8.3)
SODIUM SERPL-SCNC: 140 MMOL/L (ref 136–145)
TRIGL SERPL-MCNC: 76 MG/DL
TSH SERPL DL<=0.005 MIU/L-ACNC: 1.98 UIU/ML (ref 0.3–4.2)
VIT B12 SERPL-MCNC: 848 PG/ML (ref 232–1245)

## 2023-07-28 LAB — TESTOST SERPL-MCNC: 570 NG/DL (ref 8–950)

## 2023-07-31 ENCOUNTER — VIRTUAL VISIT (OUTPATIENT)
Dept: PSYCHOLOGY | Facility: CLINIC | Age: 28
End: 2023-07-31
Payer: COMMERCIAL

## 2023-07-31 DIAGNOSIS — F33.41 RECURRENT MAJOR DEPRESSIVE DISORDER, IN PARTIAL REMISSION (H): ICD-10-CM

## 2023-07-31 DIAGNOSIS — F90.2 ATTENTION DEFICIT HYPERACTIVITY DISORDER (ADHD), COMBINED TYPE: ICD-10-CM

## 2023-07-31 DIAGNOSIS — F41.1 GENERALIZED ANXIETY DISORDER: Primary | ICD-10-CM

## 2023-07-31 DIAGNOSIS — N52.2 DRUG-INDUCED ERECTILE DYSFUNCTION: ICD-10-CM

## 2023-07-31 PROCEDURE — 90837 PSYTX W PT 60 MINUTES: CPT | Mod: VID | Performed by: MARRIAGE & FAMILY THERAPIST

## 2023-07-31 NOTE — NURSING NOTE
Is the patient currently in the state of [place]? YES    Visit mode:VIDEO    If the visit is dropped, the patient can be reconnected by: VIDEO VISIT:  Send e-mail to at [email or web address]    Will anyone else be joining the visit? No  (If patient encounters technical issues they should call [phone])    How would you like to obtain your AVS? MyChart    Are changes needed to the allergy or medication list? N/A    Rooming Documentation: Questionnaire(s) not done per department protocol.    Reason for visit: RECHSAMMY Roldan

## 2023-07-31 NOTE — PROGRESS NOTES
Virtual Visit Details    Type of service:  Video Visit    Originating Location (pt. Location): Home    Distant Location (provider location):  Off-site  Platform used for Video Visit: [facility] - Progress Note    Date of Service: 23   Name: Ilan Bazzi  : 1995  Medical Record Number: [id number]  Treating Provider: PARISH Casiano, GLYNN   Type of Session: Individual  Present in Session: pt  Session Start and Stop Time: 5183-7308  Number of Minutes:  53    SERVICE MODALITY:  Video Visit:      Provider verified identity through the following two step process.  Patient provided:  Patient is known previously to provider    Telemedicine Visit: The patient's condition can be safely assessed and treated via synchronous audio and visual telemedicine encounter.      Reason for Telemedicine Visit: Services only offered telehealth    Originating Site (Patient Location): Patient's home    Distant Site (Provider Location): [facility]    Consent:  The patient/guardian has verbally consented to: the potential risks and benefits of telemedicine (video visit) versus in person care; bill my insurance or make self-payment for services provided; and responsibility for payment of non-covered services.     Patient would like the video invitation sent by:  My Chart    Mode of Communication:  Video Conference via [facility]    Distant Location (Provider):  Off-site    As the provider I attest to compliance with applicable laws and regulations related to telemedicine.    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  302.85 (F64.1) Gender dysphoria in Adolescents and Adults  Posttransition  ADHD by history   Provisional Diagnostic Hypothesis (Explain  R/O, other Provisional Diagnosis, and why alternative Diagnosis that were considered were ruled out): NA    Current Reported Symptoms and Status update:  Changes since last session-got hair cut for first time since hair transplant  Pt wants to increase boundaries around sexual expression   Pt wants to integrate sexual identity and expression with partners     Progress Toward Treatment Goals:   Stable/Maintenance of progress     Therapeutic Interventions/Treatment Strategies:    Area(s) of treatment focus addressed in this session included Symptom Management, Interpersonal Relationship Skills, and Sexual Health and Wellness    Psychotherapist offered support, feedback and validation and reinforced use of skills Treatment modalities used include Motivational Interviewing Dialectical Behavioral Therapy Solution Focused Therapy Interpersonal Behavioral Activation: Explored how behaviors effect mood and interact with thoughts and feelings, Relationship Skills: Discussed strategies to promote healthier understanding of interpersonal relationships, and discussed application of self compassion and explored challenging unrealistic expectations of self/others  Support, Feedback, and Structured Activity    Patient Response:   Patient responded to session by accepting feedback, listening, focusing on goals, and accepting support  Possible barriers to participation / learning include: N/A    Current Mental Status Exam:   Appearance:  Appropriate   Eye Contact:  Good   Attitude / Demeanor: Cooperative   Speech      Rate / Production: Normal/ Responsive      Volume:  Normal  volume  Orientation:  All  Mood:   Normal Euthymic  Affect:   Appropriate   Thought Content: Clear   Insight:   Good       Plan/Need for Future Services:  Return for therapy in 2 weeks to treat diagnosed problems.    Patient has a current master individualized treatment plan.  See Epic treatment plan for more information.    Referral / Collaboration:  Referral  to another professional/service is not indicated at this time..  Emergency Services Needed?  No    Assignment:  Continue w sched, things are good, have a great time on vacation!    Interactive Complexity:  There are four specific communication difficulties that complicate the work of the primary psychiatric procedure.  Interactive complexity (+44738) may be reported when at least one of these difficulties is present.    Communication difficulties present during current the psychiatric procedure include:  None.      Signature/Title:    Eros Coppola, LMFT, [facility]

## 2023-08-11 ENCOUNTER — MYC MEDICAL ADVICE (OUTPATIENT)
Dept: FAMILY MEDICINE | Facility: CLINIC | Age: 28
End: 2023-08-11
Payer: COMMERCIAL

## 2023-08-11 DIAGNOSIS — F90.9 ATTENTION DEFICIT HYPERACTIVITY DISORDER (ADHD), UNSPECIFIED ADHD TYPE: ICD-10-CM

## 2023-08-11 RX ORDER — DEXTROAMPHETAMINE SACCHARATE, AMPHETAMINE ASPARTATE, DEXTROAMPHETAMINE SULFATE AND AMPHETAMINE SULFATE 2.5; 2.5; 2.5; 2.5 MG/1; MG/1; MG/1; MG/1
10 TABLET ORAL
Qty: 30 TABLET | Refills: 0 | Status: SHIPPED | OUTPATIENT
Start: 2023-08-11 | End: 2023-09-12

## 2023-08-11 RX ORDER — DEXTROAMPHETAMINE SACCHARATE, AMPHETAMINE ASPARTATE MONOHYDRATE, DEXTROAMPHETAMINE SULFATE AND AMPHETAMINE SULFATE 7.5; 7.5; 7.5; 7.5 MG/1; MG/1; MG/1; MG/1
30 CAPSULE, EXTENDED RELEASE ORAL DAILY
Qty: 30 CAPSULE | Refills: 0 | Status: SHIPPED | OUTPATIENT
Start: 2023-08-11 | End: 2023-09-12

## 2023-08-11 NOTE — TELEPHONE ENCOUNTER
"Last seen 7/26/23    Request for medication refill:    amphetamine-dextroamphetamine (ADDERALL XR) 30 MG 24 hr capsule   amphetamine-dextroamphetamine (ADDERALL) 10 MG tablet     Providers if patient needs an appointment and you are willing to give a one month supply please refill for one month and  send a letter/MyChart using \".SMILLIMITEDREFILL\" .smillimited and route chart to \"P [facility] \" (Giving one month refill in non controlled medications is strongly recommended before denial)    If refill has been denied, meaning absolutely no refills without visit, please complete the smart phrase \".smirxrefuse\" and route it to the \"P [facility] MED REFILLS\"  pool to inform the patient and the pharmacy.    Fatimah Morrow RN    "

## 2023-08-22 ENCOUNTER — VIRTUAL VISIT (OUTPATIENT)
Dept: PSYCHOLOGY | Facility: CLINIC | Age: 28
End: 2023-08-22
Payer: COMMERCIAL

## 2023-08-22 DIAGNOSIS — F90.2 ATTENTION DEFICIT HYPERACTIVITY DISORDER (ADHD), COMBINED TYPE: ICD-10-CM

## 2023-08-22 DIAGNOSIS — N52.2 DRUG-INDUCED ERECTILE DYSFUNCTION: ICD-10-CM

## 2023-08-22 DIAGNOSIS — F64.9 GENDER DYSPHORIA: ICD-10-CM

## 2023-08-22 DIAGNOSIS — F33.41 RECURRENT MAJOR DEPRESSIVE DISORDER, IN PARTIAL REMISSION (H): ICD-10-CM

## 2023-08-22 DIAGNOSIS — F41.1 GENERALIZED ANXIETY DISORDER: Primary | ICD-10-CM

## 2023-08-22 PROCEDURE — 90834 PSYTX W PT 45 MINUTES: CPT | Mod: VID | Performed by: MARRIAGE & FAMILY THERAPIST

## 2023-08-22 ASSESSMENT — PATIENT HEALTH QUESTIONNAIRE - PHQ9
SUM OF ALL RESPONSES TO PHQ QUESTIONS 1-9: 6
SUM OF ALL RESPONSES TO PHQ QUESTIONS 1-9: 6
10. IF YOU CHECKED OFF ANY PROBLEMS, HOW DIFFICULT HAVE THESE PROBLEMS MADE IT FOR YOU TO DO YOUR WORK, TAKE CARE OF THINGS AT HOME, OR GET ALONG WITH OTHER PEOPLE: SOMEWHAT DIFFICULT

## 2023-08-22 NOTE — NURSING NOTE
Is the patient currently in the state of [place]? YES    Visit mode:VIDEO    If the visit is dropped, the patient can be reconnected by: VIDEO VISIT: Send to e-mail at: [email or web address]    Will anyone else be joining the visit? NO  (If patient encounters technical issues they should call [phone]    How would you like to obtain your AVS? MyChart    Are changes needed to the allergy or medication list? No    Reason for visit: RECHECK    Marco CHRISTIANSON

## 2023-08-22 NOTE — PROGRESS NOTES
Virtual Visit Details    Type of service:  Video Visit    Originating Location (pt. Location): Home    Distant Location (provider location):  Off-site  Platform used for Video Visit: [facility] for Sexual and Gender Health - Progress Note    Date of Service: 23   Name: Ilan Bazzi  : 1995  Medical Record Number: [id number]  Treating Provider: PARISH Casiano, GLYNN  Type of Session: Individual  Present in Session: pt  Session Start and Stop Time: 200pm-251pm  Number of Minutes:  51    SERVICE MODALITY:  Video Visit:      Provider verified identity through the following two step process.  Patient provided:  Patient is known previously to provider    Telemedicine Visit: The patient's condition can be safely assessed and treated via synchronous audio and visual telemedicine encounter.      Reason for Telemedicine Visit: Services only offered telehealth    Originating Site (Patient Location): Patient's home    Distant Site (Provider Location): [facility]    Consent:  The patient/guardian has verbally consented to: the potential risks and benefits of telemedicine (video visit) versus in person care; bill my insurance or make self-payment for services provided; and responsibility for payment of non-covered services.     Patient would like the video invitation sent by:  My Chart    Mode of Communication:  Video Conference via [facility]    Distant Location (Provider):  Off-site    As the provider I attest to compliance with applicable laws and regulations related to telemedicine.    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  302.85 (F64.1) Gender dysphoria in Adolescents and Adults  Posttransition  ADHD by history   Provisional Diagnostic Hypothesis  (Explain R/O, other Provisional Diagnosis, and why alternative Diagnosis that were considered were ruled out): NA    Current Reported Symptoms and Status update:  Changes since last session-had a good vacation w family in [place]  Pt wants to increase boundaries around sexual expression   Pt wants to integrate sexual identity and expression with partners     Progress Toward Treatment Goals:   Satisfactory progress     Therapeutic Interventions/Treatment Strategies:    Area(s) of treatment focus addressed in this session included Symptom Management, Interpersonal Relationship Skills, and Sexual Health and Wellness    Assignment:  Continue w sched, things are good, have a great time on vacation!    Psychotherapist offered support, feedback and validation and reinforced use of skills Treatment modalities used include Motivational Interviewing Dialectical Behavioral Therapy Solution Focused Therapy Interpersonal Cognitive Restructuring:  Assisted patient in identifying new neutral/positive core beliefs, Relationship Skills: Assisted patients in implementing more effective communication skills in their relationships and Discussed strategies to promote healthier understanding of interpersonal relationships, and discussed application of self compassion and explored challenging unrealistic expectations of self/others  Support, Feedback, Structured Activity, and Clarification    Patient Response:   Patient responded to session by accepting feedback, listening, focusing on goals, and accepting support  Possible barriers to participation / learning include: N/A    Current Mental Status Exam:   Appearance:  Appropriate   Eye Contact:  Good   Attitude / Demeanor: Cooperative   Speech      Rate / Production: Normal/ Responsive      Volume:  Normal  volume  Orientation:  All  Mood:   Anxious  Normal  Affect:   Appropriate   Thought Content: Clear   Insight:   Good       Plan/Need for Future Services:  Return for therapy in 2  weeks to treat diagnosed problems.    Patient has a current master individualized treatment plan.  See Epic treatment plan for more information.    Referral / Collaboration:  Referral to another professional/service is not indicated at this time..  Emergency Services Needed?  No    Assignment:  Continue self care    Interactive Complexity:  There are four specific communication difficulties that complicate the work of the primary psychiatric procedure.  Interactive complexity (+16859) may be reported when at least one of these difficulties is present.    Communication difficulties present during current the psychiatric procedure include:  None.      Signature/Title:    PARISH Casiano, [facility]

## 2023-09-12 ENCOUNTER — MYC REFILL (OUTPATIENT)
Dept: FAMILY MEDICINE | Facility: CLINIC | Age: 28
End: 2023-09-12
Payer: COMMERCIAL

## 2023-09-12 DIAGNOSIS — F90.9 ATTENTION DEFICIT HYPERACTIVITY DISORDER (ADHD), UNSPECIFIED ADHD TYPE: ICD-10-CM

## 2023-09-12 NOTE — TELEPHONE ENCOUNTER

## 2023-09-13 RX ORDER — DEXTROAMPHETAMINE SACCHARATE, AMPHETAMINE ASPARTATE MONOHYDRATE, DEXTROAMPHETAMINE SULFATE AND AMPHETAMINE SULFATE 7.5; 7.5; 7.5; 7.5 MG/1; MG/1; MG/1; MG/1
30 CAPSULE, EXTENDED RELEASE ORAL DAILY
Qty: 30 CAPSULE | Refills: 0 | Status: SHIPPED | OUTPATIENT
Start: 2023-09-13 | End: 2023-10-13

## 2023-09-13 RX ORDER — DEXTROAMPHETAMINE SACCHARATE, AMPHETAMINE ASPARTATE, DEXTROAMPHETAMINE SULFATE AND AMPHETAMINE SULFATE 2.5; 2.5; 2.5; 2.5 MG/1; MG/1; MG/1; MG/1
10 TABLET ORAL
Qty: 30 TABLET | Refills: 0 | Status: SHIPPED | OUTPATIENT
Start: 2023-09-13 | End: 2023-10-13

## 2023-09-14 ENCOUNTER — OFFICE VISIT (OUTPATIENT)
Dept: PLASTIC SURGERY | Facility: AMBULATORY SURGERY CENTER | Age: 28
End: 2023-09-14
Payer: COMMERCIAL

## 2023-09-14 DIAGNOSIS — Z98.82 HISTORY OF BREAST AUGMENTATION: Primary | ICD-10-CM

## 2023-09-14 PROCEDURE — 99212 OFFICE O/P EST SF 10 MIN: CPT | Performed by: PLASTIC SURGERY

## 2023-09-14 RX ORDER — VENLAFAXINE HYDROCHLORIDE 150 MG/1
150 CAPSULE, EXTENDED RELEASE ORAL DAILY
COMMUNITY
Start: 2023-08-11 | End: 2024-02-02

## 2023-09-14 NOTE — PROGRESS NOTES
Ilan is a 28 years old trans female status post breast augmentation.  She is 4 months out from surgery.  She is doing well.    At the physical exam, inframammary fold incisions are well-healed.  There is no evidence of capsular contracture.  There is self-induced symmastia after exercise and some minimal inferior displacement.  Otherwise she is doing well.        Plan: Patient stable from plastic surgery standpoint.  She is happy with her results.  Follow-up as needed.    Gareth Harrell MD , FACS   Diplomate American Board of Plastic Surgery  Diplomate American Board of Surgery  Adj. Assistant Professor of Surgery  Division of Plastic & Reconstructive Surgery   [facility]  Office: [phone]   9/14/2023 at 3:31 PM

## 2023-09-14 NOTE — LETTER
9/14/2023         RE: Ilna Bazzi  [address]  [place] [address]        Dear Colleague,    Thank you for referring your patient, Ilan Bazzi, to the [facility] [place]. Please see a copy of my visit note below.    Ilan is a 28 years old trans female status post breast augmentation.  She is 4 months out from surgery.  She is doing well.    At the physical exam, inframammary fold incisions are well-healed.  There is no evidence of capsular contracture.  There is self-induced symmastia after exercise and some minimal inferior displacement.  Otherwise she is doing well.        Plan: Patient stable from plastic surgery standpoint.  She is happy with her results.  Follow-up as needed.    Gareth Harrell MD , FACS   Diplomate American Board of Plastic Surgery  Diplomate American Board of Surgery  Adj. Assistant Professor of Surgery  Division of Plastic & Reconstructive Surgery   [facility]  Office: [phone]   9/14/2023 at 3:31 PM       Again, thank you for allowing me to participate in the care of your patient.        Sincerely,        Gareth Harrell MD

## 2023-09-22 ENCOUNTER — VIRTUAL VISIT (OUTPATIENT)
Dept: PSYCHOLOGY | Facility: CLINIC | Age: 28
End: 2023-09-22
Payer: COMMERCIAL

## 2023-09-22 DIAGNOSIS — N52.2 DRUG-INDUCED ERECTILE DYSFUNCTION: ICD-10-CM

## 2023-09-22 DIAGNOSIS — F41.1 GENERALIZED ANXIETY DISORDER: Primary | ICD-10-CM

## 2023-09-22 DIAGNOSIS — F33.41 RECURRENT MAJOR DEPRESSIVE DISORDER, IN PARTIAL REMISSION (H): ICD-10-CM

## 2023-09-22 DIAGNOSIS — F64.9 GENDER DYSPHORIA: ICD-10-CM

## 2023-09-22 DIAGNOSIS — F90.2 ATTENTION DEFICIT HYPERACTIVITY DISORDER (ADHD), COMBINED TYPE: ICD-10-CM

## 2023-09-22 PROCEDURE — 90837 PSYTX W PT 60 MINUTES: CPT | Mod: VID | Performed by: MARRIAGE & FAMILY THERAPIST

## 2023-09-22 NOTE — NURSING NOTE
Is the patient currently in the state of [place]? NO    Visit mode:VIDEO    If the visit is dropped, the patient can be reconnected by: VIDEO VISIT: Send to e-mail at: [email or web address]    Will anyone else be joining the visit? NO  (If patient encounters technical issues they should call [phone]    How would you like to obtain your AVS? MyChart    Are changes needed to the allergy or medication list? N/A    Reason for visit: RECHECK    Florence CHRISTIANSON

## 2023-09-22 NOTE — PROGRESS NOTES
Virtual Visit Details    Type of service:  Video Visit     Originating Location (pt. Location): Home    Distant Location (provider location):  On-site  Platform used for Video Visit: [facility] - Progress Note    Date of Service: 23   Name: Ilan Bazzi  : 1995  Medical Record Number: [id number]  Treating Provider: PARISH Casiano, GLYNN  Type of Session: Individual  Present in Session: pt  Session Start and Stop Time: 3793-0833  Number of Minutes:  59    SERVICE MODALITY:  Video Visit:      Provider verified identity through the following two step process.  Patient provided:  Patient is known previously to provider    Telemedicine Visit: The patient's condition can be safely assessed and treated via synchronous audio and visual telemedicine encounter.      Reason for Telemedicine Visit: Services only offered telehealth    Originating Site (Patient Location): Patient's home    Distant Site (Provider Location): [facility]    Consent:  The patient/guardian has verbally consented to: the potential risks and benefits of telemedicine (video visit) versus in person care; bill my insurance or make self-payment for services provided; and responsibility for payment of non-covered services.     Patient would like the video invitation sent by:  My Chart    Mode of Communication:  Video Conference via [facility]    Distant Location (Provider):  On-site    As the provider I attest to compliance with applicable laws and regulations related to telemedicine.    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  302.85 (F64.1) Gender dysphoria in Adolescents and Adults  Posttransition  ADHD by history   Provisional Diagnostic Hypothesis (Explain  R/O, other Provisional Diagnosis, and why alternative Diagnosis that were considered were ruled out): NA    Current Reported Symptoms and Status update:  Changes since last session-struggling w getting things done-worried about adhd  Pt wants to increase boundaries around sexual expression   Pt wants to integrate sexual identity and expression with partners     Progress Toward Treatment Goals:   Satisfactory progress     Therapeutic Interventions/Treatment Strategies:    Area(s) of treatment focus addressed in this session included Symptom Management, Develop / Improve Independent Living Skills, and Maintenance of Progress    Psychotherapist offered support, feedback and validation and reinforced use of skills Treatment modalities used include Motivational Interviewing Cognitive Behavioral Therapy Interpersonal Behavioral Activation: Reinforced benefits/challenges of change process through applying skills to replace unwanted behaviors and Encouraged strategies to reduce individual procrastination and increase motivation by increasing goal-directed activities to enhance mood and reduce symptoms. and Cognitive Restructuring:  Explored impact of ineffective thoughts / distortions on mood and activity and Facilitated recognition of the connection between negative thoughts and negative core beliefs  Support, Feedback, and Structured Activity    Patient Response:   Patient responded to session by accepting feedback, listening, focusing on goals, accepting support, and verbalizing understanding  Possible barriers to participation / learning include: N/A    Current Mental Status Exam:   Appearance:  Appropriate   Eye Contact:  Good   Attitude / Demeanor: Cooperative   Speech      Rate / Production: Normal/ Responsive      Volume:  Normal  volume  Orientation:  All  Mood:   Anxious   Affect:   Appropriate   Thought Content: Clear   Insight:   Good       Plan/Need for Future Services:  Return for therapy in 2 weeks to treat  diagnosed problems.    Patient has a current master individualized treatment plan.  See Epic treatment plan for more information.    Referral / Collaboration:  Referral to another professional/service is not indicated at this time..  Emergency Services Needed?  No    Assignment:  Use adhd skills changing language toward self     Interactive Complexity:  There are four specific communication difficulties that complicate the work of the primary psychiatric procedure.  Interactive complexity (+41596) may be reported when at least one of these difficulties is present.    Communication difficulties present during current the psychiatric procedure include:  None.      Signature/Title:  Eros Coppola, LMFT, [facility]

## 2023-10-03 ENCOUNTER — VIRTUAL VISIT (OUTPATIENT)
Dept: PSYCHOLOGY | Facility: CLINIC | Age: 28
End: 2023-10-03
Payer: COMMERCIAL

## 2023-10-03 DIAGNOSIS — F64.9 GENDER DYSPHORIA: ICD-10-CM

## 2023-10-03 DIAGNOSIS — F33.41 RECURRENT MAJOR DEPRESSIVE DISORDER, IN PARTIAL REMISSION (H): ICD-10-CM

## 2023-10-03 DIAGNOSIS — N52.2 DRUG-INDUCED ERECTILE DYSFUNCTION: ICD-10-CM

## 2023-10-03 DIAGNOSIS — F41.1 GENERALIZED ANXIETY DISORDER: Primary | ICD-10-CM

## 2023-10-03 DIAGNOSIS — F90.2 ATTENTION DEFICIT HYPERACTIVITY DISORDER (ADHD), COMBINED TYPE: ICD-10-CM

## 2023-10-03 PROCEDURE — 90834 PSYTX W PT 45 MINUTES: CPT | Mod: VID | Performed by: MARRIAGE & FAMILY THERAPIST

## 2023-10-03 NOTE — PROGRESS NOTES
[place] [facility] - Progress Note    Date of Service: 10/03/23   Name: Ilan Bazzi  : 1995  Medical Record Number: [id number]  Treating Provider: PARISH Casiano LADC  Type of Session: Individual  Present in Session: pt  Session Start and Stop Time: 0066-8162  Number of Minutes:  52    SERVICE MODALITY:  Video Visit:      Provider verified identity through the following two step process.  Patient provided:  Patient is known previously to provider    Telemedicine Visit: The patient's condition can be safely assessed and treated via synchronous audio and visual telemedicine encounter.      Reason for Telemedicine Visit: Services only offered telehealth    Originating Site (Patient Location): Patient's home    Distant Site (Provider Location): [facility]    Consent:  The patient/guardian has verbally consented to: the potential risks and benefits of telemedicine (video visit) versus in person care; bill my insurance or make self-payment for services provided; and responsibility for payment of non-covered services.     Patient would like the video invitation sent by:  My Chart    Mode of Communication:  [facility]    Distant Location (Provider):  Off-site    As the provider I attest to compliance with applicable laws and regulations related to telemedicine.    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  302.85 (F64.1) Gender dysphoria in Adolescents and Adults  Posttransition  ADHD by history   Provisional Diagnostic Hypothesis (Explain R/O, other Provisional Diagnosis, and why alternative Diagnosis that were considered were ruled out): NA    Current Reported Symptoms and Status update:  Changes since last session-has been  struggling w executive dysfunction  Pt wants to increase boundaries around sexual expression   Pt wants to integrate sexual identity and expression with partners     Progress Toward Treatment Goals:   Satisfactory progress     Therapeutic Interventions/Treatment Strategies:    Area(s) of treatment focus addressed in this session included Symptom Management, Interpersonal Relationship Skills, and Sexual Health and Wellness  Focusing on skill development and will be working on skill in private practice     Psychotherapist offered support, feedback and validation and reinforced use of skills Treatment modalities used include Motivational Interviewing Interpersonal Behavioral Activation: Reinforced benefits/challenges of change process through applying skills to replace unwanted behaviors and Encouraged strategies to reduce individual procrastination and increase motivation by increasing goal-directed activities to enhance mood and reduce symptoms.  Support, Feedback, and Problem Solving    Patient Response:   Patient responded to session by accepting feedback, listening, and accepting support  Possible barriers to participation / learning include: N/A    Current Mental Status Exam:   Appearance:  Appropriate   Eye Contact:  Good   Attitude / Demeanor: Cooperative   Speech      Rate / Production: Normal/ Responsive      Volume:  Normal  volume  Orientation:  All  Mood:   Normal  Affect:   Appropriate   Thought Content: Clear   Insight:   Good       Plan/Need for Future Services:  Return for therapy in 4 weeks to treat diagnosed problems.    Patient has a current master individualized treatment plan.  See Epic treatment plan for more information.    Referral / Collaboration:  Referral to another professional/service is not indicated at this time..  Emergency Services Needed?  No    Assignment:  Will decide when she needs more sessions will call in    Interactive Complexity:  There are four specific communication  difficulties that complicate the work of the primary psychiatric procedure.  Interactive complexity (+39672) may be reported when at least one of these difficulties is present.    Communication difficulties present during current the psychiatric procedure include:  None.      Signature/Title:    PARISH Casiano, [facility]         Virtual Visit Details    Type of service:  Video Visit     Originating Location (pt. Location): Home    Distant Location (provider location):  Off-site  Platform used for Video Visit: Julian

## 2023-10-03 NOTE — NURSING NOTE
Is the patient currently in the state of [place]? YES    Visit mode:VIDEO    If the visit is dropped, the patient can be reconnected by: VIDEO VISIT:  Send e-mail to at [email or web address]    Will anyone else be joining the visit? No  (If patient encounters technical issues they should call [phone])    How would you like to obtain your AVS? MyChart    Are changes needed to the allergy or medication list? N/A    Rooming Documentation: Questionnaire(s) not assigned, not done per department protocol.    Reason for visit: ESTHER Triana, KEANUF

## 2023-10-07 ENCOUNTER — HEALTH MAINTENANCE LETTER (OUTPATIENT)
Age: 28
End: 2023-10-07

## 2023-10-13 DIAGNOSIS — F90.9 ATTENTION DEFICIT HYPERACTIVITY DISORDER (ADHD), UNSPECIFIED ADHD TYPE: ICD-10-CM

## 2023-10-13 RX ORDER — DEXTROAMPHETAMINE SACCHARATE, AMPHETAMINE ASPARTATE MONOHYDRATE, DEXTROAMPHETAMINE SULFATE AND AMPHETAMINE SULFATE 7.5; 7.5; 7.5; 7.5 MG/1; MG/1; MG/1; MG/1
CAPSULE, EXTENDED RELEASE ORAL
Qty: 30 CAPSULE | Refills: 0 | Status: SHIPPED | OUTPATIENT
Start: 2023-10-13 | End: 2023-11-14

## 2023-10-13 RX ORDER — DEXTROAMPHETAMINE SACCHARATE, AMPHETAMINE ASPARTATE, DEXTROAMPHETAMINE SULFATE AND AMPHETAMINE SULFATE 2.5; 2.5; 2.5; 2.5 MG/1; MG/1; MG/1; MG/1
TABLET ORAL
Qty: 30 TABLET | Refills: 0 | Status: SHIPPED | OUTPATIENT
Start: 2023-10-13 | End: 2023-11-14

## 2023-10-18 ENCOUNTER — MYC MEDICAL ADVICE (OUTPATIENT)
Dept: FAMILY MEDICINE | Facility: CLINIC | Age: 28
End: 2023-10-18
Payer: COMMERCIAL

## 2023-10-18 DIAGNOSIS — F32.A DEPRESSION, UNSPECIFIED DEPRESSION TYPE: ICD-10-CM

## 2023-10-18 NOTE — TELEPHONE ENCOUNTER
"Last seen 7/26/23    Request for medication refill:    venlafaxine (EFFEXOR-ER) 150 MG 24 hr tablet     Providers if patient needs an appointment and you are willing to give a one month supply please refill for one month and  send a letter/MyChart using \".SMILLIMITEDREFILL\" .smillimited and route chart to \"P SMI \" (Giving one month refill in non controlled medications is strongly recommended before denial)    If refill has been denied, meaning absolutely no refills without visit, please complete the smart phrase \".smirxrefuse\" and route it to the \"P [facility] MED REFILLS\"  pool to inform the patient and the pharmacy.    Fatimah Morrow RN    "

## 2023-10-19 RX ORDER — VENLAFAXINE HYDROCHLORIDE 150 MG/1
150 TABLET, EXTENDED RELEASE ORAL DAILY
Qty: 30 TABLET | Refills: 11 | Status: SHIPPED | OUTPATIENT
Start: 2023-10-19 | End: 2024-05-28

## 2023-10-30 RX ORDER — VENLAFAXINE HYDROCHLORIDE 150 MG/1
150 CAPSULE, EXTENDED RELEASE ORAL DAILY
Qty: 30 CAPSULE | Refills: 2 | OUTPATIENT
Start: 2023-10-30

## 2023-10-30 NOTE — TELEPHONE ENCOUNTER
Duplicate refill request for venlafaxine (EFFEXOR-ER) 150 MG 24 hr tablet . Script sent on 10/19/23. Will decline this request.   Abbey Roldan RN

## 2023-11-13 DIAGNOSIS — F90.9 ATTENTION DEFICIT HYPERACTIVITY DISORDER (ADHD), UNSPECIFIED ADHD TYPE: ICD-10-CM

## 2023-11-14 RX ORDER — DEXTROAMPHETAMINE SACCHARATE, AMPHETAMINE ASPARTATE MONOHYDRATE, DEXTROAMPHETAMINE SULFATE AND AMPHETAMINE SULFATE 7.5; 7.5; 7.5; 7.5 MG/1; MG/1; MG/1; MG/1
CAPSULE, EXTENDED RELEASE ORAL
Qty: 30 CAPSULE | Refills: 0 | Status: SHIPPED | OUTPATIENT
Start: 2023-11-14 | End: 2023-12-18

## 2023-11-14 RX ORDER — DEXTROAMPHETAMINE SACCHARATE, AMPHETAMINE ASPARTATE, DEXTROAMPHETAMINE SULFATE AND AMPHETAMINE SULFATE 2.5; 2.5; 2.5; 2.5 MG/1; MG/1; MG/1; MG/1
TABLET ORAL
Qty: 30 TABLET | Refills: 0 | Status: SHIPPED | OUTPATIENT
Start: 2023-11-14 | End: 2023-12-18

## 2023-11-22 NOTE — TELEPHONE ENCOUNTER
Diagnosis, Referred by & from: Anal Fissure   Appt date: 2/12/2024   NOTES STATUS DETAILS   OFFICE NOTE from referring provider Internal [facility]:  11/10/23 - MyChart referral from Dr. Hudson (pt picture or it within)  7/26/23, 7/28/22 - [facility] OV with Dr. Hudson   OFFICE NOTE from other specialist [facility]:  3/8/23 - [facility] OV with KERLINE Max   DISCHARGE SUMMARY from hospital N/A    DISCHARGE REPORT from the ER N/A    OPERATIVE REPORT N/A    MEDICATION LIST Internal    LABS N/A    DIAGNOSTIC PROCEDURES N/A    IMAGING (DISC & REPORT)      ULTRASOUND  (ENDOANAL/ENDORECTAL) Internal [facility]:  10/20/20 - US Testicular/Scrotum     
2

## 2023-11-28 ENCOUNTER — MYC MEDICAL ADVICE (OUTPATIENT)
Dept: FAMILY MEDICINE | Facility: CLINIC | Age: 28
End: 2023-11-28
Payer: COMMERCIAL

## 2023-11-28 DIAGNOSIS — F64.9 GENDER DYSPHORIA: Primary | ICD-10-CM

## 2023-12-01 ENCOUNTER — TELEPHONE (OUTPATIENT)
Dept: PLASTIC SURGERY | Facility: CLINIC | Age: 28
End: 2023-12-01
Payer: COMMERCIAL

## 2023-12-01 NOTE — CONFIDENTIAL NOTE
[facility] :  Care Coordination Note     SITUATION   Ilan Bazzi (she/her) is a 28 year old adult who is receiving support for:  Care Team  .    BACKGROUND     Pt is scheduled for a vaginoplasty consult with Eve Reilly on 1/29/24. Ilan is unsure what kind of bottom surgery she's looking for and may want to discuss orchiectomy as well.     ASSESSMENT     Surgery              CGC Assessment  [facility] (CGC) Enrollment: (P) Enrolled  Patient has a therapist: (P) Yes  Letter of support #1: (P) Requested  Letter of support #2: (P) Requested  Surgery being considered: (P) Yes  Vaginoplasty: (P) Yes    Pt reports:   No nicotine  HRT 6 years  Prior breast aug with Dr. Harrell, hair removal transplant and FFS  PLAN          Nursing Interventions:       Follow-up plan:  1. Obtain LOS for bottom surgery when nearer to surgery readiness.        Kari Schwartz

## 2023-12-06 NOTE — TELEPHONE ENCOUNTER
FUTURE VISIT INFORMATION      FUTURE VISIT INFORMATION:  Date: 1/29/24  Time: 9:00am  Location: [facility]  REFERRAL INFORMATION:  Reason for visit/diagnosis  vaginoplasty consult     RECORDS REQUESTED FROM:       No recs to collect

## 2024-01-03 DIAGNOSIS — F64.9 GENDER DYSPHORIA: ICD-10-CM

## 2024-01-03 DIAGNOSIS — F64.0 TRANSSEXUALISM: ICD-10-CM

## 2024-01-03 RX ORDER — FINASTERIDE 1 MG/1
TABLET, FILM COATED ORAL
Qty: 90 TABLET | Refills: 2 | Status: SHIPPED | OUTPATIENT
Start: 2024-01-03 | End: 2024-10-01

## 2024-01-03 RX ORDER — ESTRADIOL 2 MG/1
4 TABLET ORAL 2 TIMES DAILY
Qty: 120 TABLET | Refills: 2 | Status: SHIPPED | OUTPATIENT
Start: 2024-01-03 | End: 2024-03-26

## 2024-01-03 NOTE — TELEPHONE ENCOUNTER
"Request for medication refill:  estradiol (ESTRACE) 2 MG tablet     finasteride (PROPECIA) 1 MG tablet     Providers if patient needs an appointment and you are willing to give a one month supply please refill for one month and  send a letter/MyChart using \".SMILLIMITEDREFILL\" .smillimited and route chart to \"P [facility] \" (Giving one month refill in non controlled medications is strongly recommended before denial)    If refill has been denied, meaning absolutely no refills without visit, please complete the smart phrase \".smirxrefuse\" and route it to the \"P [facility] MED REFILLS\"  pool to inform the patient and the pharmacy.    Raghu Cisneros CMA      "

## 2024-01-17 DIAGNOSIS — F90.9 ATTENTION DEFICIT HYPERACTIVITY DISORDER (ADHD), UNSPECIFIED ADHD TYPE: ICD-10-CM

## 2024-01-18 RX ORDER — DEXTROAMPHETAMINE SACCHARATE, AMPHETAMINE ASPARTATE MONOHYDRATE, DEXTROAMPHETAMINE SULFATE AND AMPHETAMINE SULFATE 7.5; 7.5; 7.5; 7.5 MG/1; MG/1; MG/1; MG/1
CAPSULE, EXTENDED RELEASE ORAL
Qty: 30 CAPSULE | Refills: 0 | Status: SHIPPED | OUTPATIENT
Start: 2024-01-18 | End: 2024-05-02

## 2024-01-18 RX ORDER — DEXTROAMPHETAMINE SACCHARATE, AMPHETAMINE ASPARTATE, DEXTROAMPHETAMINE SULFATE AND AMPHETAMINE SULFATE 2.5; 2.5; 2.5; 2.5 MG/1; MG/1; MG/1; MG/1
TABLET ORAL
Qty: 30 TABLET | Refills: 0 | Status: SHIPPED | OUTPATIENT
Start: 2024-01-18 | End: 2024-02-02

## 2024-01-29 ENCOUNTER — OFFICE VISIT (OUTPATIENT)
Dept: PLASTIC SURGERY | Facility: CLINIC | Age: 29
End: 2024-01-29
Payer: COMMERCIAL

## 2024-01-29 ENCOUNTER — PRE VISIT (OUTPATIENT)
Dept: PLASTIC SURGERY | Facility: CLINIC | Age: 29
End: 2024-01-29

## 2024-01-29 VITALS
WEIGHT: 169 LBS | OXYGEN SATURATION: 98 % | DIASTOLIC BLOOD PRESSURE: 71 MMHG | SYSTOLIC BLOOD PRESSURE: 130 MMHG | HEART RATE: 101 BPM | HEIGHT: 71 IN | BODY MASS INDEX: 23.66 KG/M2

## 2024-01-29 DIAGNOSIS — F64.9 GENDER DYSPHORIA: ICD-10-CM

## 2024-01-29 PROCEDURE — 99215 OFFICE O/P EST HI 40 MIN: CPT | Performed by: NURSE PRACTITIONER

## 2024-01-29 ASSESSMENT — PAIN SCALES - GENERAL: PAINLEVEL: NO PAIN (0)

## 2024-01-29 NOTE — LETTER
1/29/2024       RE: Ilan Bazzi  [address]  [place] [address]       Dear Colleague,    Thank you for referring your patient, Ilan Bazzi, to the [facility] [place] [facility]. Please see a copy of my visit note below.        Name: Ilan Bazzi     MRN: [id number]   YOB: 1995                 Chief Complaint:   Gender Dysphoria            History of Present Illness:   Ilan is a 28 year old transgender female seen in consultation for gender dysphoria    Patient transitioned starting in 2018  Preferred pronouns are: she/her/hers  The patient has been on exogenous hormones since: 2018.  In terms of an intimate relationship and support system, the patient has a good support system. She lives with her partner and 3 other close friends.  In terms of fertility, the patient: has no desire for sperm banking    (New)  The patient has obtained letters of support from providers for previous gender surgeries, but none for bottom surgery. She has providers who will write these whenever needed.     (Prior Surgery)  The patient has previously undergone breast augmentation and FFS. This was performed in 2021 (FFS) and 2023 (top surgery) by Dr Cardenas (FFS) and DR Harrell (top surgery).  Postoperatively, the patient experienced no complications.     Long-term surgical goals for the patient include: penile preserving full depth vaginoplasty. Her main goal is to preserve penile function as she has currently, as well as establish a full depth canal for receptive vaginal intercourse. She would like to discuss possible options for preserving scrotal skin, whether preserving testicles is possible, whether hair removal is needed, etc.    The patient is here today expressing interest in penile preserving full depth vaginoplasty.    The patient has not begun hair removal.         Past Medical History:     Past  "Medical History:   Diagnosis Date    Anxiety     Concussion without loss of consciousness 2001    Left varicocele 7/20/2021            Past Surgical History:     Past Surgical History:   Procedure Laterality Date    ENT SURGERY      wisdom teeth extraction    LE FORT ONE N/A 6/8/2017    Procedure: LE FORT ONE;  LEF FORT ONE OSTEOTOMY;  Surgeon: Murray Tran DDS;  Location: [facility] OR    MAMMOPLASTY AUGMENTATION Bilateral 5/9/2023    Procedure: AUGMENTATION, BREAST BILATERAL;  Surgeon: Gareth Harrell MD;  Location: [facility] OR    RECONSTRUCT FOREHEAD N/A 3/9/2021    Procedure: 1. Anterior frontal sinus osteotomy and setback,  2. Bony recontouring of forehead, fronto-orbital bar,  3. Bony recontouring of naso-frontal junction,  4. Bony recontouring and ostectomy of orbits, bilateral,  5. Bony recontouring of bilateral anterior temporal lines,  7. Elevation of bi-pedicled pericranial flap, with release and decompression of bilateral supra-orbital neurovascular b    RECONSTRUCT MANDIBLE N/A 3/9/2021    Procedure: 8. Ostectomy and contouring of mandible and chin,  9. Thyroid reduction chondroplasty,;  Surgeon: Shayla Cardenas MD;  Location: [facility] OR    SUSPEND BROW Bilateral 3/9/2021    Procedure: 6. Open repair of bilateral brow ptosis with forehead Endotine,;  Surgeon: Shayla Cardenas MD;  Location: [facility] OR   Hair transplant Aug 2022 (Dr Herrera)  Next hair transplant coming July 2024         Social History:     Social History     Tobacco Use    Smoking status: Never    Smokeless tobacco: Never   Substance Use Topics    Alcohol use: Not Currently            Family History:     Family History   Problem Relation Age of Onset    Depression Mother     Macular Degeneration Father     Other - See Comments Father 66        \"total autonomic failure\" diagnosed at [facility]    Alzheimer Disease Maternal Grandmother     Myocardial Infarction Maternal Grandfather     Coronary Artery Disease Paternal Grandmother     Myocardial Infarction Paternal " "Grandmother 70    Glaucoma No family hx of               Allergies:     Allergies   Allergen Reactions    Wellbutrin [Bupropion] Swelling     Pruritus and edema to feet            Medications:     Current Outpatient Medications   Medication Sig    amphetamine-dextroamphetamine (ADDERALL XR) 30 MG 24 hr capsule TAKE ONE Capsule (30mg) BY MOUTH EVERY DAY    amphetamine-dextroamphetamine (ADDERALL) 10 MG tablet TAKE ONE Tablet (10mg) BY MOUTH EVERY DAY AT 2PM    estradiol (ESTRACE) 2 MG tablet TAKE TWO TABLETS BY MOUTH TWICE DAILY    Ferrous Gluconate 240 (27 Fe) MG TABS Take by mouth daily    finasteride (PROPECIA) 1 MG tablet TAKE ONE Tablet (1mg) BY MOUTH EVERY EVENING    Multiple Vitamins-Minerals (MULTIVITAL PO) Take by mouth every morning     venlafaxine (EFFEXOR XR) 150 MG 24 hr capsule Take 150 mg by mouth daily    venlafaxine (EFFEXOR-ER) 150 MG 24 hr tablet Take 1 tablet (150 mg) by mouth daily     No current facility-administered medications for this visit.             Review of Systems:    ROS: ROS neg other than the symptoms noted above in the HPI.          Physical Exam:   /71 (BP Location: Left arm, Patient Position: Chair, Cuff Size: Adult Regular)   Pulse 101   Ht 1.803 m (5' 10.98\")   Wt 76.7 kg (169 lb)   SpO2 98%   BMI 23.58 kg/m    General: age-appropriate in NAD  HEENT: Head AT/NC, EOMI, CN Grossly intact  Resp: no respiratory distress  :  exam deferred  Neuro: grossly intact  Motor: excellent strength throughout  Skin: clear of rashes or ecchymoses.        Outside records:    I spent 10 minutes reviewing outside records.         Assessment and Plan:   28 year old transgender female with gender dysphoria    The criteria for genital surgery are specific to the type of surgery being requested.  Criteria for bottom surgery:    1. Persistent, well documented gender dysphoria;  2. Capacity to make a fully informed decision and to consent for treatment;  3. Age of consent (>18 years " old)  4. If significant medical or mental health concerns are present, they must be well controlled.  5. 12 continuous months of hormone therapy as appropriate to the patient s gender goals (unless  the patient has a medical contraindication or is otherwise unable or unwilling to take  Hormones).  6. Two letters of support    The aim of hormone therapy prior to gonadectomy is primarily to introduce a period of reversible  estrogen or testosterone suppression, before the patient undergoes irreversible surgical intervention.    I reviewed the steps of orchiectomy. I reviewed the surgical procedure. I reviewed the risks and benefits including bleeding, infection and irreversible nature of the procedure. The patient would like to discuss .    Hair removal is a requirement prior to full depth vaginoplasty as the genital skin will be placed in the vaginal cavity. Lack of hair removal would lead to complications related to intravaginal hair. This is nearly impossible to remove postoperatively. We discussed hair removal for this procedure may vary depending on what Dr Aden plans for surgery and what skin he would use to help line canal.    She has a persistent, well documented gender dysphoria. She has capacity to make a fully informed decision and to consent for treatment. Her mental health issues are well controlled. She has been on continuous hormones for years. She will obtain two letters of support when needed.     The patient meets all of these criteria. We discussed that gender affirmation surgery should be considered permanent. We discussed risks/complications of rectal injury, rectovaginal fistula, bleeding, fluid collection, infection, injury to surrounding structures, flap loss, sensory loss, wound dehiscence, vaginal prolapse, vaginal shrinkage/stenosis, need for lifelong dilation, urinary stream abnormalities, DVT/PE and need for revision surgery.     We discussed the option for minimal depth and full depth.  She would like penile preserving vaginoplasty with full depth canal.     We also discussed the need to stop hormones ioana-procedurally for 1 week before and after surgery.     We discussed that transgender vaginoplasty for this patient would require a further conversation with the surgeon to discuss specifics of planning surgery.       Discussed possible need for some hair removal if genital skin planned for part of canal lining.  Not ready for prior auth      Plan: Patient will meet with Dr Aden virtually to further discuss her wishes and questions about penile preserving FD vaginoplasty.    F/U: Virtual visit on 2/2/24 with Dr Aden      45 minutes spent on day of encounter doing chart review, history and exam, consultation and education, and additional activities as including in note above.          Again, thank you for allowing me to participate in the care of your patient.      Sincerely,    SCOTT Thompson CNP

## 2024-01-29 NOTE — PROGRESS NOTES
Name: Ilan Bazzi     MRN: [id number]   YOB: 1995                 Chief Complaint:   Gender Dysphoria            History of Present Illness:   Ilan is a 28 year old transgender female seen in consultation for gender dysphoria    Patient transitioned starting in 2018  Preferred pronouns are: she/her/hers  The patient has been on exogenous hormones since: 2018.  In terms of an intimate relationship and support system, the patient has a good support system. She lives with her partner and 3 other close friends.  In terms of fertility, the patient: has no desire for sperm banking    (New)  The patient has obtained letters of support from providers for previous gender surgeries, but none for bottom surgery. She has providers who will write these whenever needed.     (Prior Surgery)  The patient has previously undergone breast augmentation and FFS. This was performed in 2021 (FFS) and 2023 (top surgery) by Dr Cardenas (FFS) and DR Harrell (top surgery).  Postoperatively, the patient experienced no complications.     Long-term surgical goals for the patient include: penile preserving full depth vaginoplasty. Her main goal is to preserve penile function as she has currently, as well as establish a full depth canal for receptive vaginal intercourse. She would like to discuss possible options for preserving scrotal skin, whether preserving testicles is possible, whether hair removal is needed, etc.    The patient is here today expressing interest in penile preserving full depth vaginoplasty.    The patient has not begun hair removal.         Past Medical History:     Past Medical History:   Diagnosis Date    Anxiety     Concussion without loss of consciousness 2001    Left varicocele 7/20/2021            Past Surgical History:     Past Surgical History:   Procedure Laterality Date    ENT SURGERY      wisdom teeth extraction    LE FORT ONE N/A 6/8/2017    Procedure: LE FORT ONE;  LEF FORT ONE OSTEOTOMY;   "Surgeon: Murray Tran DDS;  Location: [facility] OR    MAMMOPLASTY AUGMENTATION Bilateral 5/9/2023    Procedure: AUGMENTATION, BREAST BILATERAL;  Surgeon: Gareth Harrell MD;  Location: [facility] OR    RECONSTRUCT FOREHEAD N/A 3/9/2021    Procedure: 1. Anterior frontal sinus osteotomy and setback,  2. Bony recontouring of forehead, fronto-orbital bar,  3. Bony recontouring of naso-frontal junction,  4. Bony recontouring and ostectomy of orbits, bilateral,  5. Bony recontouring of bilateral anterior temporal lines,  7. Elevation of bi-pedicled pericranial flap, with release and decompression of bilateral supra-orbital neurovascular b    RECONSTRUCT MANDIBLE N/A 3/9/2021    Procedure: 8. Ostectomy and contouring of mandible and chin,  9. Thyroid reduction chondroplasty,;  Surgeon: Shayla Cardenas MD;  Location: [facility]    SUSPEND BROW Bilateral 3/9/2021    Procedure: 6. Open repair of bilateral brow ptosis with forehead Endotine,;  Surgeon: Shayla Cardenas MD;  Location: [facility] OR   Hair transplant Aug 2022 (Dr Herrera)  Next hair transplant coming July 2024         Social History:     Social History     Tobacco Use    Smoking status: Never    Smokeless tobacco: Never   Substance Use Topics    Alcohol use: Not Currently            Family History:     Family History   Problem Relation Age of Onset    Depression Mother     Macular Degeneration Father     Other - See Comments Father 66        \"total autonomic failure\" diagnosed at [facility]    Alzheimer Disease Maternal Grandmother     Myocardial Infarction Maternal Grandfather     Coronary Artery Disease Paternal Grandmother     Myocardial Infarction Paternal Grandmother 70    Glaucoma No family hx of               Allergies:     Allergies   Allergen Reactions    Wellbutrin [Bupropion] Swelling     Pruritus and edema to feet            Medications:     Current Outpatient Medications   Medication Sig    amphetamine-dextroamphetamine (ADDERALL XR) 30 MG 24 hr capsule TAKE ONE Capsule (30mg) " "BY MOUTH EVERY DAY    amphetamine-dextroamphetamine (ADDERALL) 10 MG tablet TAKE ONE Tablet (10mg) BY MOUTH EVERY DAY AT 2PM    estradiol (ESTRACE) 2 MG tablet TAKE TWO TABLETS BY MOUTH TWICE DAILY    Ferrous Gluconate 240 (27 Fe) MG TABS Take by mouth daily    finasteride (PROPECIA) 1 MG tablet TAKE ONE Tablet (1mg) BY MOUTH EVERY EVENING    Multiple Vitamins-Minerals (MULTIVITAL PO) Take by mouth every morning     venlafaxine (EFFEXOR XR) 150 MG 24 hr capsule Take 150 mg by mouth daily    venlafaxine (EFFEXOR-ER) 150 MG 24 hr tablet Take 1 tablet (150 mg) by mouth daily     No current facility-administered medications for this visit.             Review of Systems:    ROS: ROS neg other than the symptoms noted above in the HPI.          Physical Exam:   /71 (BP Location: Left arm, Patient Position: Chair, Cuff Size: Adult Regular)   Pulse 101   Ht 1.803 m (5' 10.98\")   Wt 76.7 kg (169 lb)   SpO2 98%   BMI 23.58 kg/m    General: age-appropriate in NAD  HEENT: Head AT/NC, EOMI, CN Grossly intact  Resp: no respiratory distress  :  exam deferred  Neuro: grossly intact  Motor: excellent strength throughout  Skin: clear of rashes or ecchymoses.        Outside records:    I spent 10 minutes reviewing outside records.         Assessment and Plan:   28 year old transgender female with gender dysphoria    The criteria for genital surgery are specific to the type of surgery being requested.  Criteria for bottom surgery:    1. Persistent, well documented gender dysphoria;  2. Capacity to make a fully informed decision and to consent for treatment;  3. Age of consent (>18 years old)  4. If significant medical or mental health concerns are present, they must be well controlled.  5. 12 continuous months of hormone therapy as appropriate to the patient s gender goals (unless  the patient has a medical contraindication or is otherwise unable or unwilling to take  Hormones).  6. Two letters of support    The aim of " hormone therapy prior to gonadectomy is primarily to introduce a period of reversible  estrogen or testosterone suppression, before the patient undergoes irreversible surgical intervention.    I reviewed the steps of orchiectomy. I reviewed the surgical procedure. I reviewed the risks and benefits including bleeding, infection and irreversible nature of the procedure. The patient would like to discuss .    Hair removal is a requirement prior to full depth vaginoplasty as the genital skin will be placed in the vaginal cavity. Lack of hair removal would lead to complications related to intravaginal hair. This is nearly impossible to remove postoperatively. We discussed hair removal for this procedure may vary depending on what Dr Aden plans for surgery and what skin he would use to help line canal.    She has a persistent, well documented gender dysphoria. She has capacity to make a fully informed decision and to consent for treatment. Her mental health issues are well controlled. She has been on continuous hormones for years. She will obtain two letters of support when needed.     The patient meets all of these criteria. We discussed that gender affirmation surgery should be considered permanent. We discussed risks/complications of rectal injury, rectovaginal fistula, bleeding, fluid collection, infection, injury to surrounding structures, flap loss, sensory loss, wound dehiscence, vaginal prolapse, vaginal shrinkage/stenosis, need for lifelong dilation, urinary stream abnormalities, DVT/PE and need for revision surgery.     We discussed the option for minimal depth and full depth. She would like penile preserving vaginoplasty with full depth canal.     We also discussed the need to stop hormones ioana-procedurally for 1 week before and after surgery.     We discussed that transgender vaginoplasty for this patient would require a further conversation with the surgeon to discuss specifics of planning surgery.        Discussed possible need for some hair removal if genital skin planned for part of canal lining.  Not ready for prior auth      Plan: Patient will meet with Dr Aden virtually to further discuss her wishes and questions about penile preserving FD vaginoplasty.    F/U: Virtual visit on 2/2/24 with SCOTT Hernandes, CNP  [facility]    45 minutes spent on day of encounter doing chart review, history and exam, consultation and education, and additional activities as including in note above.

## 2024-01-29 NOTE — NURSING NOTE
"Chief Complaint   Patient presents with    Consult     Ilan, is being seen today for a consult regarding vaginoplasty.       Vitals:    01/29/24 1108   BP: 130/71   BP Location: Left arm   Patient Position: Chair   Cuff Size: Adult Regular   Pulse: 101   SpO2: 98%   Weight: 76.7 kg (169 lb)   Height: 1.803 m (5' 10.98\")       Body mass index is 23.58 kg/m .      Kerrie Mcclure LPN    "

## 2024-01-31 ENCOUNTER — PRE VISIT (OUTPATIENT)
Dept: UROLOGY | Facility: CLINIC | Age: 29
End: 2024-01-31
Payer: COMMERCIAL

## 2024-01-31 NOTE — TELEPHONE ENCOUNTER
Reason for visit: discuss surgery options with Dr. Markie menard    Dx/Hx/Sx: gender dysphoria     Records/imaging/labs/orders: in epic    At Rooming: virtual visit

## 2024-02-01 ENCOUNTER — OFFICE VISIT (OUTPATIENT)
Dept: FAMILY MEDICINE | Facility: CLINIC | Age: 29
End: 2024-02-01
Payer: COMMERCIAL

## 2024-02-01 ENCOUNTER — MYC MEDICAL ADVICE (OUTPATIENT)
Dept: FAMILY MEDICINE | Facility: CLINIC | Age: 29
End: 2024-02-01

## 2024-02-01 VITALS
WEIGHT: 167.6 LBS | OXYGEN SATURATION: 97 % | SYSTOLIC BLOOD PRESSURE: 109 MMHG | TEMPERATURE: 98.2 F | DIASTOLIC BLOOD PRESSURE: 69 MMHG | HEART RATE: 84 BPM | BODY MASS INDEX: 23.46 KG/M2 | HEIGHT: 71 IN

## 2024-02-01 DIAGNOSIS — F90.9 ATTENTION DEFICIT HYPERACTIVITY DISORDER (ADHD), UNSPECIFIED ADHD TYPE: ICD-10-CM

## 2024-02-01 DIAGNOSIS — M99.02 SEGMENTAL AND SOMATIC DYSFUNCTION OF THORACIC REGION: ICD-10-CM

## 2024-02-01 DIAGNOSIS — M99.08 SEGMENTAL AND SOMATIC DYSFUNCTION OF RIB CAGE: ICD-10-CM

## 2024-02-01 DIAGNOSIS — L08.9 PUSTULE: Primary | ICD-10-CM

## 2024-02-01 DIAGNOSIS — F64.9 GENDER DYSPHORIA: ICD-10-CM

## 2024-02-01 DIAGNOSIS — R07.81 RIB PAIN: ICD-10-CM

## 2024-02-01 PROCEDURE — 90472 IMMUNIZATION ADMIN EACH ADD: CPT | Performed by: STUDENT IN AN ORGANIZED HEALTH CARE EDUCATION/TRAINING PROGRAM

## 2024-02-01 PROCEDURE — 90471 IMMUNIZATION ADMIN: CPT | Performed by: STUDENT IN AN ORGANIZED HEALTH CARE EDUCATION/TRAINING PROGRAM

## 2024-02-01 PROCEDURE — 98925 OSTEOPATH MANJ 1-2 REGIONS: CPT | Performed by: STUDENT IN AN ORGANIZED HEALTH CARE EDUCATION/TRAINING PROGRAM

## 2024-02-01 PROCEDURE — 90686 IIV4 VACC NO PRSV 0.5 ML IM: CPT | Performed by: STUDENT IN AN ORGANIZED HEALTH CARE EDUCATION/TRAINING PROGRAM

## 2024-02-01 PROCEDURE — 99214 OFFICE O/P EST MOD 30 MIN: CPT | Mod: 25 | Performed by: STUDENT IN AN ORGANIZED HEALTH CARE EDUCATION/TRAINING PROGRAM

## 2024-02-01 PROCEDURE — 90651 9VHPV VACCINE 2/3 DOSE IM: CPT | Performed by: STUDENT IN AN ORGANIZED HEALTH CARE EDUCATION/TRAINING PROGRAM

## 2024-02-01 PROCEDURE — 90746 HEPB VACCINE 3 DOSE ADULT IM: CPT | Performed by: STUDENT IN AN ORGANIZED HEALTH CARE EDUCATION/TRAINING PROGRAM

## 2024-02-01 RX ORDER — MUPIROCIN 20 MG/G
OINTMENT TOPICAL 3 TIMES DAILY
Qty: 30 G | Refills: 1 | Status: SHIPPED | OUTPATIENT
Start: 2024-02-01 | End: 2024-05-02

## 2024-02-01 ASSESSMENT — PATIENT HEALTH QUESTIONNAIRE - PHQ9
10. IF YOU CHECKED OFF ANY PROBLEMS, HOW DIFFICULT HAVE THESE PROBLEMS MADE IT FOR YOU TO DO YOUR WORK, TAKE CARE OF THINGS AT HOME, OR GET ALONG WITH OTHER PEOPLE: SOMEWHAT DIFFICULT
SUM OF ALL RESPONSES TO PHQ QUESTIONS 1-9: 8
SUM OF ALL RESPONSES TO PHQ QUESTIONS 1-9: 8

## 2024-02-01 NOTE — PROGRESS NOTES
"  Assessment & Plan     Pustule  Appears c/w folliculitis  - mupirocin (BACTROBAN) 2 % external ointment  Dispense: 30 g; Refill: 1    Attention deficit hyperactivity disorder (ADHD), unspecified ADHD type  Working with ADHD therapist. We have done a lot of tweaking with stimulants in the past. Getting results but still hoping to address ability to stay to task outside of employment. Pt interested in trying new amphetamine formulation based on what they have heard. Will send to see if covered.    Gender dysphoria  Stable on current HRT    Rib pain  Segmental and somatic dysfunction of thoracic region  Segmental and somatic dysfunction of rib cage  Given that this has been interfering with the patient's quality of life and ADLs, after discussion, informed consent, and medical assessment for safety, we have together decided to address this concern with Osteopathic Manipulative Treatment. Suspect MSK in part related to post-op myofascial restrictions/acitvity mods after top surgery. Upper body fitess/weights could also be playing a role.   - OSTEOPATHIC MANIP,1-2 BODY REGN  - Please see OMT Procedure Note below for the specifics of treatment.  - RTC prn for OMT     Prescription drug management  31 minutes spent by me on the date of the encounter doing chart review, history and exam, documentation and further activities per the note      Return in about 3 months (around 5/1/2024) for Follow up, with me.    Kameron Talavera is a 28 year old, presenting for the following health issues:  Follow Up (Medication follow up + skin spot)    HPI     [facility]   Generic came out for adderall and people say it works better for them potentially - Mydayis     if i want to go down on meds at all     if im worried about going down on meds, splitting it into a 20 XR for one prescription and a 10 XR for another prescription so its still \"30 mg/day\" for the month but means we can make changes and check results without " having to wait a full 30 days to change the prescription again.    ADHD:   Things have been pretty good until past 2-3 weeks. Had anumber of changes in normal routine. Job for a week then sick a few days -- for a while wasn't getting workouts in. Harder to get stuff done even on the ADHD meds.   - unsure if depression or ADHD  - noticed it when filling out the PHQ9  - sometimes the short release feels more effective. Wonders if long release is too high of a dose. Sometimes feels like she needs to do something physically.   - not more twitchy/ticky   - feels like memory has gotten a little worse   - doesn't think symptoms are affecting ability to do things at work. Sometimes a little hard to get t work     - when she doesn't take adderall, feels tired. Less alert. Sleep is adequate in general.   - fitbit says average 0a80zom in January   - average resting HR 69 in January, for Nov was 66      Biggest problem w/ ADHD Is doing projects for myself -- recording, reading, writing, etc     Tried Concerta once and it felt pretty parallel           8/22/2023     1:06 PM 9/22/2023     9:35 AM 2/1/2024     1:41 PM   PHQ   PHQ-9 Total Score 6 10 8   Q9: Thoughts of better off dead/self-harm past 2 weeks Not at all Not at all Not at all         Answers submitted by the patient for this visit:  Patient Health Questionnaire (Submitted on 2/1/2024)  If you checked off any problems, how difficult have these problems made it for you to do your work, take care of things at home, or get along with other people?: Somewhat difficult  PHQ9 TOTAL SCORE: 8    +++++++++++++++++++++++++++++++    Skin spots:   R neck, L posterior shoulder/back   Neck: since early December. Started as a pimple. Not putting anything on it.   L arm: noticed this one around the same time, December. Not sure how long it has been there.     ++++++++++++++  Breathing/ribs: ribs hurt with deep breaths and certain positions         Objective    /69   Pulse 84  "  Temp 98.2  F (36.8  C) (Oral)   Ht 1.803 m (5' 11\")   Wt 76 kg (167 lb 9.6 oz)   SpO2 97%   BMI 23.38 kg/m    Body mass index is 23.38 kg/m .  Physical Exam   GENERAL: alert and no distress  EYES: Eyes grossly normal to inspection, PERRL and conjunctivae and sclerae normal  RESP: lungs clear to auscultation - no rales, rhonchi or wheezes  CV: regular rate and rhythm, normal S1 S2, no S3 or S4, no murmur, click or rub, no peripheral edema  MS: no gross musculoskeletal defects noted, no edema                OMT PROCEDURE NOTE       Body Region: T-spine and Ribs  Somatic Dysfunction: resitricted diaphragmatic excursion, decreased respiratory motion, decreased T spine segmental motion   Treatment: seated BLT and supine doming of the diaphragm, seated T spine articulartory Techniques.   Outcome: Improved         The patient actively participated in OMT and was able to communicate both positive and negative feedback throughout. OMT completed without incident. Patient tolerated treatment well. Patient reported that ROM, function, and/or pain level were improved. Advised that pain is occasionally worse during the first 24 hours after treatment and that drinking more water and taking Tylenol or Ibuprofen often help. Patient to return in 2-4 week/s or as needed for repeat osteopathic evaluation.       Sofy Hudson DO   Signed Electronically by: Sofy Hudson DO      "

## 2024-02-01 NOTE — COMMUNITY RESOURCES LIST (ENGLISH)
02/01/2024   [facility]  N/A  For questions about this resource list or additional care needs, please contact your primary care clinic or care manager.  Phone: [phone]   Email: N/A   Address: [address] [place] [address]   Hours: N/A        Financial Stability       Utility payment assistance  1  [facility] Distance: 0.8 miles      In-Person, Phone/Virtual   [address] [place] [address]  Language: English  Hours: Mon - Fri 8:00 AM - 4:30 PM  Fees: Free   Phone: (541) 157-3485 Email: giorgineymarpdesk@[facility] Website: https://www.[facility] Center[facility] Inc/[facility]/departments/health-and-wellness     2  [place] [facility] - [facility] and Utilities Distance: 0.85 miles      In-Person, Phone/Virtual   [address] [place] [address]  Language: English  Hours: Mon - Fri 8:30 AM - 4:30 PM  Fees: Free   Phone: (887) 403-3944 Website: https://[place].[facility]/"[employer]"/energy/consumer-assistance/energy-assistance-program/          Food and Nutrition       Food pantry  3  [facility] Distance: 1.06 miles      [facility]   [address] [place] [address]  Language: English  Hours: Mon - Tue 9:00 AM - 3:00 PM  Fees: Free, Self Pay   Phone: (337) 501-6077 Email: noelle@[facility] [employer].org Website: http://[facility].org/[facility]/     4  [facility] Distance: 1.17 miles      [facility]   [address] [place] [address]  Language: English  Hours: Mon - Fri 12:00 PM - 1:00 PM  Fees: Free   Phone: (944) 891-5962 Email: info@[employer].org Website: https://www.[facility].org/locations/hospitals_[facility]_ymca     SNAP application assistance  5  [place] [employer] of [employer] Services - [facility] (SNAP) Distance: 0.53 miles      Phone/Virtual   [address] [address] [place] [address]  Language: English, Hmong, Australian, Papua New Guinean, Australian, Indonesian   Hours: Mon - Fri 9:00 AM - 5:00 PM  Fees: Free   Phone: (499) 135-4620 Website: https://[place].gov/dhs/people-we-serve/adults/economic-assistance/food-nutrition/programs-and-services/supplemental-nutrition-assistance-program.jsp     6  [employer] [place] Distance: 0.73 miles      Phone/Virtual   [address] [place] [address] [address] [place] [address]  Language: English, Hmong, Belarusian, Bulgarian, Burundian  Hours: Mon - Fri 8:30 AM - 4:30 PM  Fees: Free   Phone: (771) 224-6888 Email: helpline@[employer].org Website: https://www.hungersolutions.org/programs/[place]-food-helpline/     Soup kitchen or free meals  7  [facility] [place] [facility] Distance: 1.36 miles      Pickup   [address] [place] [address]  Language: English  Hours: Wed 2:00 PM - 4:00 PM , Fri 2:00 PM - 4:00 PM  Fees: Free   Phone: (577) 690-8082 Email: Ariel@.[facility] in [facility] [place]. Website: https://www.[facility]/facilities/tiagnccog-xlfuj-gvzymuyiz-[place]     8  [facility] - [facility] Distance: 2.12 miles      Pickup   [address] [place] [address]  Language: English  Hours: Mon - Fri 5:00 PM - 6:00 PM  Fees: Free   Phone: (162) 785-2236 Email: brant@[facility].org Website: http://www.[facility].org/          Transportation       Free or low-cost transportation  9  [employer] [facility] Circulator Bus Distance: 4.54 miles      In-Person   [address] [place] [place] [address]  Language: English  Hours: Tue 9:00 AM - 2:00 PM  Fees: Self Pay   Phone: (525) 671-7670 Email: info@[employer].org Website: http://www.[employer]ene[employer]ird.org/     10  [employer] Distance: 5.24 miles      In-Person   [address] [place] [address]  Language: English, Burundian  Hours: Mon 9:00 AM - 5:00 PM , Tue 9:30 AM - 7:00 PM , Wed 9:00 AM - 5:00 PM , Thu 9:30 AM - 7:00 PM , Fri 9:00 AM - 5:00 PM  Fees: Free   Phone: (477) 201-7402 Email: contactus@[employer] Website: http://www.[employer].org      Transportation to medical appointments  11  [facility] - Non-Emergency Medical Transportation Distance: 1.62 miles      In-Person   [address] [place] [address]  Language: English  Hours: Mon - Fri 8:00 AM - 4:00 PM Appt. Only  Fees: Self Pay   Phone: (134) 418-9703 Website: http://www.[employer].org/Medical-Services/Emergency-medical-services/Non-emergency-transportation/     12  [employer] Distance: 3.57 miles      In-Person   [address] [address]  Language: English  Hours: Mon - Thu 6:00 AM - 6:00 PM , Fri 6:00 AM - 5:00 PM  Fees: Insurance, Self Pay   Phone: (625) 850-4049 Email: office@[employer] Website: https://www.[employer]/          Important Numbers & Websites       Emergency Services   911  [facility] Services   311  Poison Control   (800) 788-5992  Suicide Prevention Lifeline   (625) 254-7038 (TALK)  Child Abuse Hotline   (531) 210-2647 (4-A-Child)  Sexual Assault Hotline   (468) 498-5309 (HOPE)  National Runaway Safeline   (368) 967-7205 (RUNAWAY)  All-Options Talkline   (161) 955-7591  Substance Abuse Referral   (617) 840-8911 (HELP)

## 2024-02-02 ENCOUNTER — VIRTUAL VISIT (OUTPATIENT)
Dept: UROLOGY | Facility: CLINIC | Age: 29
End: 2024-02-02
Payer: COMMERCIAL

## 2024-02-02 DIAGNOSIS — F64.9 GENDER DYSPHORIA: Primary | ICD-10-CM

## 2024-02-02 PROCEDURE — 99442 PR PHYSICIAN TELEPHONE EVALUATION 11-20 MIN: CPT | Mod: 93 | Performed by: UROLOGY

## 2024-02-02 RX ORDER — DEXTROAMPHETAMINE SACCHARATE, AMPHETAMINE ASPARTATE MONOHYDRATE, DEXTROAMPHETAMINE SULFATE, AMPHETAMINE SULFATE 6.25; 6.25; 6.25; 6.25 MG/1; MG/1; MG/1; MG/1
25 CAPSULE, EXTENDED RELEASE ORAL DAILY
Qty: 30 CAPSULE | Refills: 0 | Status: SHIPPED | OUTPATIENT
Start: 2024-02-15 | End: 2024-02-13

## 2024-02-02 RX ORDER — DEXTROAMPHETAMINE SACCHARATE, AMPHETAMINE ASPARTATE, DEXTROAMPHETAMINE SULFATE AND AMPHETAMINE SULFATE 2.5; 2.5; 2.5; 2.5 MG/1; MG/1; MG/1; MG/1
TABLET ORAL
Qty: 30 TABLET | Refills: 0 | Status: SHIPPED | OUTPATIENT
Start: 2024-02-15 | End: 2024-02-13

## 2024-02-02 ASSESSMENT — PAIN SCALES - GENERAL: PAINLEVEL: NO PAIN (0)

## 2024-02-02 NOTE — PROGRESS NOTES
Virtual Visit Details    Type of service:  Telephone Visit   15 minutes  Originating Location (pt. Location): Home    Distant Location (provider location):  Off-site  Telephone    28 year old transgender patient.  She is most interested in penile preserving vaginoplasty.  She has some done research.  She would like to keep the phallus and scrotum appearing as they are.  She would also like to keep her testicles.  She still enjoys receptive vaginal intercourse.  A/P:  Gender dysphoria    We discussed penile preserving vaginoplasty.  Approaches to keeping scrotum intact.  We discussed would need tissue to bridge defect from introitus to peritoneal flap.  We discussed this would likely be hairless groin or lower abdomen tissue.  We discussed this approach wouldn't yield much of labia minora, but we could make a functional canal while preserving the phallus and scrotum.  I directed patient to a few websites and encouraged the patient to find a postoperative result that they find favorable. They should reach out when they want to further consider surgery.  I would want to do an exam in clinic, review picture, and we can plan for surgery in the future.    Andrea Aden MD

## 2024-02-02 NOTE — NURSING NOTE
Send Doximity link to pt mobile [phone] when ready for visit. Pt aware provider is running late    Is the patient currently in the state of [place]? YES    Visit mode:VIDEO    If the visit is dropped, the patient can be reconnected by: VIDEO VISIT: Text to cell phone:   Telephone Information:   Mobile [phone]       Will anyone else be joining the visit? NO  (If patient encounters technical issues they should call [phone])    How would you like to obtain your AVS? MyChart    Are changes needed to the allergy or medication list? No    Reason for visit: RECHECK    Felicia CHRISTIANSON

## 2024-02-02 NOTE — LETTER
2/2/2024     RE: Ilan Bazzi  [address]  [place] [address]     Dear Colleague,    Thank you for referring your patient, Ilan Bazzi, to the [facility] [place] [facility]. Please see a copy of my visit note below.    Virtual Visit Details    Type of service:  Telephone Visit   15 minutes  Originating Location (pt. Location): Home    Distant Location (provider location):  Off-site  Telephone    28 year old transgender patient.  She is most interested in penile preserving vaginoplasty.  She has some done research.  She would like to keep the phallus and scrotum appearing as they are.  She would also like to keep her testicles.  She still enjoys receptive vaginal intercourse.  A/P:  Gender dysphoria    We discussed penile preserving vaginoplasty.  Approaches to keeping scrotum intact.  We discussed would need tissue to bridge defect from introitus to peritoneal flap.  We discussed this would likely be hairless groin or lower abdomen tissue.  We discussed this approach wouldn't yield much of labia minora, but we could make a functional canal while preserving the phallus and scrotum.  I directed patient to a few websites and encouraged the patient to find a postoperative result that they find favorable. They should reach out when they want to further consider surgery.  I would want to do an exam in clinic, review picture, and we can plan for surgery in the future.  Andrea Aden MD

## 2024-02-12 ENCOUNTER — OFFICE VISIT (OUTPATIENT)
Dept: SURGERY | Facility: CLINIC | Age: 29
End: 2024-02-12
Attending: STUDENT IN AN ORGANIZED HEALTH CARE EDUCATION/TRAINING PROGRAM
Payer: COMMERCIAL

## 2024-02-12 ENCOUNTER — PRE VISIT (OUTPATIENT)
Dept: SURGERY | Facility: CLINIC | Age: 29
End: 2024-02-12

## 2024-02-12 VITALS
DIASTOLIC BLOOD PRESSURE: 75 MMHG | WEIGHT: 170.7 LBS | HEIGHT: 71 IN | HEART RATE: 114 BPM | OXYGEN SATURATION: 98 % | SYSTOLIC BLOOD PRESSURE: 126 MMHG | BODY MASS INDEX: 23.9 KG/M2

## 2024-02-12 DIAGNOSIS — K60.1 CHRONIC ANAL FISSURE: Primary | ICD-10-CM

## 2024-02-12 PROCEDURE — 99214 OFFICE O/P EST MOD 30 MIN: CPT

## 2024-02-12 RX ORDER — POLYETHYLENE GLYCOL 3350 17 G/17G
1 POWDER, FOR SOLUTION ORAL DAILY
Qty: 90 PACKET | Refills: 3 | Status: SHIPPED | OUTPATIENT
Start: 2024-02-12

## 2024-02-12 ASSESSMENT — PAIN SCALES - GENERAL: PAINLEVEL: MILD PAIN (3)

## 2024-02-12 NOTE — PROGRESS NOTES
"Colon and Rectal Surgery Follow-Up Clinic Note    RE: Ilan Bazzi  : 1995  ADITHYA: 2024    Ilan Bazzi is a very pleasant 28 year old adult here for follow-up of anal fissure.    Interval history: I met Ilan in 2023. She had a posterior midline anal fissure and tried topical nitroglycerine for it without much success. We planned adding a bowel regimen in addition to another course of the topical nitroglycerine. Unfortunately she stopped the nitroglycerine as it gave her headaches. She continues on daily fiber but stopped the Miralax. She has soft/formed bowel movements for the most part but still sometimes has harder stools. Overall symptoms have improved and are less frequent. She estimates that she now has pain/bleeding once every three bowel movements, though the intensity of pain is the same.     Physical Examination: Exam was chaperoned by Richy Villalpando, EMT-B   /75 (BP Location: Left arm, Patient Position: Sitting, Cuff Size: Adult Regular)   Pulse 114   Ht 5' 11\"   Wt 170 lb 11.2 oz   SpO2 98%   BMI 23.81 kg/m    General: alert, oriented, in no acute distress, sitting comfortably  Perianal external examination:  Perianal skin: Intact with no excoriation or lichenification.  Lesions: No evidence of an external lesion, nodularity, or induration in the perianal region.  Eversion of buttocks: There was evidence of an anal fissure. Details: posterior midline anal fissure.  Skin tags or external hemorrhoids: None.    Digital rectal examination: Was deferred in order not to cause further trauma.    Anoscopy: Was deferred in order not to cause further trauma.    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 We discussed treatment options moving forward. She is concerned about the cost of topical diltiazem, but discussed that it may still be reasonable relative to Botox and surgery. She is agreeable to trying it but will need a few weeks to get it. This is fine. Continue " daily fiber and add in Miralax to keep stools softer. Asked her to reach out to me in about 2 months if there is no improvement, and we can consider Botox vs surgery. Patient's questions were answered to her stated satisfaction and she is in agreement with this plan.     Medical history:  Past Medical History:   Diagnosis Date    Anxiety     Concussion without loss of consciousness 2001    Left varicocele 7/20/2021       Surgical history:  Past Surgical History:   Procedure Laterality Date    ENT SURGERY      wisdom teeth extraction    LE FORT ONE N/A 6/8/2017    Procedure: LE FORT ONE;  LEF FORT ONE OSTEOTOMY;  Surgeon: Murray Tran DDS;  Location: [facility]    MAMMOPLASTY AUGMENTATION Bilateral 5/9/2023    Procedure: AUGMENTATION, BREAST BILATERAL;  Surgeon: Gareth Harrell MD;  Location: [facility] OR    RECONSTRUCT FOREHEAD N/A 3/9/2021    Procedure: 1. Anterior frontal sinus osteotomy and setback,  2. Bony recontouring of forehead, fronto-orbital bar,  3. Bony recontouring of naso-frontal junction,  4. Bony recontouring and ostectomy of orbits, bilateral,  5. Bony recontouring of bilateral anterior temporal lines,  7. Elevation of bi-pedicled pericranial flap, with release and decompression of bilateral supra-orbital neurovascular b    RECONSTRUCT MANDIBLE N/A 3/9/2021    Procedure: 8. Ostectomy and contouring of mandible and chin,  9. Thyroid reduction chondroplasty,;  Surgeon: Shayla Cardenas MD;  Location: [facility] OR    SUSPEND BROW Bilateral 3/9/2021    Procedure: 6. Open repair of bilateral brow ptosis with forehead Endotine,;  Surgeon: Shayla Cardenas MD;  Location: [facility] OR       Problem list:    Patient Active Problem List    Diagnosis Date Noted    Gender dysphoria 04/20/2023     Priority: Medium     Added automatically from request for surgery [id number]      Recurrent major depressive disorder, in partial remission (H24) 05/16/2022     Priority: Medium    Generalized anxiety disorder 05/16/2022     Priority: Medium     "Drug-induced erectile dysfunction 05/16/2022     Priority: Medium    Restless legs syndrome (RLS) 08/12/2021     Priority: Medium     Low ferritin August 2021, improved w/ supplementation       Attention deficit hyperactivity disorder (ADHD), combined type 04/27/2021     Priority: Medium    Depression 04/27/2021     Priority: Medium    PTSD (post-traumatic stress disorder) 04/27/2021     Priority: Medium    Gender dysphoria in adolescent and adult 12/09/2020     Priority: Medium     Feminizing HRT since April 2019      Mild anxiety 10/16/2020     Priority: Medium    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     Priority: Medium    Panic disorder with agoraphobia 09/01/2009     Priority: Medium       Medications:  Current Outpatient Medications   Medication Sig Dispense Refill    [START ON 2/15/2024] Amphet-Dextroamphet 3-Bead ER (MYDAYIS) 25 MG CP24 Take 25 mg by mouth daily 30 capsule 0    amphetamine-dextroamphetamine (ADDERALL XR) 30 MG 24 hr capsule TAKE ONE Capsule (30mg) BY MOUTH EVERY DAY 30 capsule 0    [START ON 2/15/2024] amphetamine-dextroamphetamine (ADDERALL) 10 MG tablet TAKE ONE Tablet (10mg) BY MOUTH EVERY DAY AT 2PM 30 tablet 0    estradiol (ESTRACE) 2 MG tablet TAKE TWO TABLETS BY MOUTH TWICE DAILY 120 tablet 2    Ferrous Gluconate 240 (27 Fe) MG TABS Take by mouth daily      finasteride (PROPECIA) 1 MG tablet TAKE ONE Tablet (1mg) BY MOUTH EVERY EVENING 90 tablet 2    Multiple Vitamins-Minerals (MULTIVITAL PO) Take by mouth every morning       mupirocin (BACTROBAN) 2 % external ointment Apply topically 3 times daily 30 g 1    venlafaxine (EFFEXOR-ER) 150 MG 24 hr tablet Take 1 tablet (150 mg) by mouth daily 30 tablet 11       Allergies:  Allergies   Allergen Reactions    Wellbutrin [Bupropion] Swelling     Pruritus and edema to feet       Family history:  Family History   Problem Relation Age of Onset    Depression Mother     Macular Degeneration Father     Other - See Comments Father 66        \"total " "autonomic failure\" diagnosed at [place]    Alzheimer Disease Maternal Grandmother     Myocardial Infarction Maternal Grandfather     Coronary Artery Disease Paternal Grandmother     Myocardial Infarction Paternal Grandmother 70    Glaucoma No family hx of        Social history:  Social History     Tobacco Use    Smoking status: Never    Smokeless tobacco: Never   Substance Use Topics    Alcohol use: Not Currently     Marital status: single.    Nursing Notes:   Richy Villalpando  2/12/2024  2:53 PM  Signed  Chief Complaint   Patient presents with    Consult     Anal fissure.       Vitals:    02/12/24 1449   BP: 126/75   BP Location: Left arm   Patient Position: Sitting   Cuff Size: Adult Regular   Pulse: 114   SpO2: 98%   Weight: 170 lb 11.2 oz   Height: 5' 11\"       Body mass index is 23.81 kg/m .    Patient reports mild anal pain (3/10) with bowel movements.    Richy Villalpando, EMT       30 minutes spent on the date of encounter performing chart review, history and exam, documentation and further activities as noted above.    -----------------------------------------------------  Susana Wagner PA-C  Colon and Rectal Surgery  [facility]   "

## 2024-02-12 NOTE — LETTER
"2024       RE: Ilan Bazzi  [address]  [place] [address]       Dear Colleague,    Thank you for referring your patient, Ilan Bazzi, to the [facility]AL SURGERY [facility]IN[facility] [place] [facility]. Please see a copy of my visit note below.    Colon and Rectal Surgery Follow-Up Clinic Note    RE: Ilan Bazzi  : 1995  ADITHYA: 2024    Ilan Bazzi is a very pleasant 28 year old adult here for follow-up of anal fissure.    Interval history: I met Ilan in 2023. She had a posterior midline anal fissure and tried topical nitroglycerine for it without much success. We planned adding a bowel regimen in addition to another course of the topical nitroglycerine. Unfortunately she stopped the nitroglycerine as it gave her headaches. She continues on daily fiber but stopped the Miralax. She has soft/formed bowel movements for the most part but still sometimes has harder stools. Overall symptoms have improved and are less frequent. She estimates that she now has pain/bleeding once every three bowel movements, though the intensity of pain is the same.     Physical Examination: Exam was chaperoned by Richy Villalpando, EMT-B   /75 (BP Location: Left arm, Patient Position: Sitting, Cuff Size: Adult Regular)   Pulse 114   Ht 5' 11\"   Wt 170 lb 11.2 oz   SpO2 98%   BMI 23.81 kg/m    General: alert, oriented, in no acute distress, sitting comfortably  Perianal external examination:  Perianal skin: Intact with no excoriation or lichenification.  Lesions: No evidence of an external lesion, nodularity, or induration in the perianal region.  Eversion of buttocks: There was evidence of an anal fissure. Details: posterior midline anal fissure.  Skin tags or external hemorrhoids: None.    Digital rectal examination: Was deferred in order not to cause further trauma.    Anoscopy: Was deferred in order not to cause further " trauma.    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 We discussed treatment options moving forward. She is concerned about the cost of topical diltiazem, but discussed that it may still be reasonable relative to Botox and surgery. She is agreeable to trying it but will need a few weeks to get it. This is fine. Continue daily fiber and add in Miralax to keep stools softer. Asked her to reach out to me in about 2 months if there is no improvement, and we can consider Botox vs surgery. Patient's questions were answered to her stated satisfaction and she is in agreement with this plan.     Medical history:  Past Medical History:   Diagnosis Date    Anxiety     Concussion without loss of consciousness 2001    Left varicocele 7/20/2021       Surgical history:  Past Surgical History:   Procedure Laterality Date    ENT SURGERY      wisdom teeth extraction    LE FORT ONE N/A 6/8/2017    Procedure: LE FORT ONE;  LEF FORT ONE OSTEOTOMY;  Surgeon: Murray Tran DDS;  Location: [facility]    MAMMOPLASTY AUGMENTATION Bilateral 5/9/2023    Procedure: AUGMENTATION, BREAST BILATERAL;  Surgeon: Gareth Harrell MD;  Location: [facility] OR    RECONSTRUCT FOREHEAD N/A 3/9/2021    Procedure: 1. Anterior frontal sinus osteotomy and setback,  2. Bony recontouring of forehead, fronto-orbital bar,  3. Bony recontouring of naso-frontal junction,  4. Bony recontouring and ostectomy of orbits, bilateral,  5. Bony recontouring of bilateral anterior temporal lines,  7. Elevation of bi-pedicled pericranial flap, with release and decompression of bilateral supra-orbital neurovascular b    RECONSTRUCT MANDIBLE N/A 3/9/2021    Procedure: 8. Ostectomy and contouring of mandible and chin,  9. Thyroid reduction chondroplasty,;  Surgeon: Shayla Cardenas MD;  [facility]    SUSPEND BROW Bilateral 3/9/2021    Procedure: 6. Open repair of bilateral brow ptosis with forehead Endotine,;  Surgeon: Shayla Cardenas MD;  Location: [facility] OR        Problem list:    Patient Active Problem List    Diagnosis Date Noted    Gender dysphoria 04/20/2023     Priority: Medium     Added automatically from request for surgery [id number]      Recurrent major depressive disorder, in partial remission (H24) 05/16/2022     Priority: Medium    Generalized anxiety disorder 05/16/2022     Priority: Medium    Drug-induced erectile dysfunction 05/16/2022     Priority: Medium    Restless legs syndrome (RLS) 08/12/2021     Priority: Medium     Low ferritin August 2021, improved w/ supplementation       Attention deficit hyperactivity disorder (ADHD), combined type 04/27/2021     Priority: Medium    Depression 04/27/2021     Priority: Medium    PTSD (post-traumatic stress disorder) 04/27/2021     Priority: Medium    Gender dysphoria in adolescent and adult 12/09/2020     Priority: Medium     Feminizing HRT since April 2019      Mild anxiety 10/16/2020     Priority: Medium    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     Priority: Medium    Panic disorder with agoraphobia 09/01/2009     Priority: Medium       Medications:  Current Outpatient Medications   Medication Sig Dispense Refill    [START ON 2/15/2024] Amphet-Dextroamphet 3-Bead ER (MYDAYIS) 25 MG CP24 Take 25 mg by mouth daily 30 capsule 0    amphetamine-dextroamphetamine (ADDERALL XR) 30 MG 24 hr capsule TAKE ONE Capsule (30mg) BY MOUTH EVERY DAY 30 capsule 0    [START ON 2/15/2024] amphetamine-dextroamphetamine (ADDERALL) 10 MG tablet TAKE ONE Tablet (10mg) BY MOUTH EVERY DAY AT 2PM 30 tablet 0    estradiol (ESTRACE) 2 MG tablet TAKE TWO TABLETS BY MOUTH TWICE DAILY 120 tablet 2    Ferrous Gluconate 240 (27 Fe) MG TABS Take by mouth daily      finasteride (PROPECIA) 1 MG tablet TAKE ONE Tablet (1mg) BY MOUTH EVERY EVENING 90 tablet 2    Multiple Vitamins-Minerals (MULTIVITAL PO) Take by mouth every morning       mupirocin (BACTROBAN) 2 % external ointment Apply topically 3 times daily 30 g 1    venlafaxine (EFFEXOR-ER) 150 MG 24  "hr tablet Take 1 tablet (150 mg) by mouth daily 30 tablet 11       Allergies:  Allergies   Allergen Reactions    Wellbutrin [Bupropion] Swelling     Pruritus and edema to feet       Family history:  Family History   Problem Relation Age of Onset    Depression Mother     Macular Degeneration Father     Other - See Comments Father 66        \"total autonomic failure\" diagnosed at [place]    Alzheimer Disease Maternal Grandmother     Myocardial Infarction Maternal Grandfather     Coronary Artery Disease Paternal Grandmother     Myocardial Infarction Paternal Grandmother 70    Glaucoma No family hx of        Social history:  Social History     Tobacco Use    Smoking status: Never    Smokeless tobacco: Never   Substance Use Topics    Alcohol use: Not Currently     Marital status: single.    Nursing Notes:   Richy Villalpando  2/12/2024  2:53 PM  Signed  Chief Complaint   Patient presents with    Consult     Anal fissure.       Vitals:    02/12/24 1449   BP: 126/75   BP Location: Left arm   Patient Position: Sitting   Cuff Size: Adult Regular   Pulse: 114   SpO2: 98%   Weight: 170 lb 11.2 oz   Height: 5' 11\"       Body mass index is 23.81 kg/m .    Patient reports mild anal pain (3/10) with bowel movements.    Richy Villalpando, EMT       30 minutes spent on the date of encounter performing chart review, history and exam, documentation and further activities as noted above.    -----------------------------------------------------      Again, thank you for allowing me to participate in the care of your patient.      Sincerely,    Susana Wagner PA-C    "

## 2024-02-12 NOTE — PATIENT INSTRUCTIONS
It was very nice to see you again today!    Our plan:  Diltiazem ointment from the compounding pharmacy. You have to call them to get it shipped to you. Once you get it, apply it 3 times a day for 8 weeks.  Make sure to keep using the fiber supplement every day.  I sent a prescription for Miralax to your normal pharmacy.  Reach out to me (on MyChart or clinic visit) if you still have no improvement after 2 months of the ointment. We can consider Botox as a next step, which needs a prior authorization.

## 2024-02-12 NOTE — NURSING NOTE
"Chief Complaint   Patient presents with    Consult     Anal fissure.       Vitals:    02/12/24 1449   BP: 126/75   BP Location: Left arm   Patient Position: Sitting   Cuff Size: Adult Regular   Pulse: 114   SpO2: 98%   Weight: 170 lb 11.2 oz   Height: 5' 11\"       Body mass index is 23.81 kg/m .    Patient reports mild anal pain (3/10) with bowel movements.    Richy Villalpando, EMT    "

## 2024-02-13 ENCOUNTER — TELEPHONE (OUTPATIENT)
Dept: FAMILY MEDICINE | Facility: CLINIC | Age: 29
End: 2024-02-13
Payer: COMMERCIAL

## 2024-02-13 RX ORDER — DEXTROAMPHETAMINE SACCHARATE, AMPHETAMINE ASPARTATE, DEXTROAMPHETAMINE SULFATE AND AMPHETAMINE SULFATE 2.5; 2.5; 2.5; 2.5 MG/1; MG/1; MG/1; MG/1
TABLET ORAL
Qty: 30 TABLET | Refills: 0 | Status: SHIPPED | OUTPATIENT
Start: 2024-02-15 | End: 2024-03-26

## 2024-02-13 RX ORDER — DEXTROAMPHETAMINE SACCHARATE, AMPHETAMINE ASPARTATE MONOHYDRATE, DEXTROAMPHETAMINE SULFATE, AMPHETAMINE SULFATE 6.25; 6.25; 6.25; 6.25 MG/1; MG/1; MG/1; MG/1
25 CAPSULE, EXTENDED RELEASE ORAL DAILY
Qty: 30 CAPSULE | Refills: 0 | Status: SHIPPED | OUTPATIENT
Start: 2024-02-15 | End: 2024-03-26

## 2024-02-13 NOTE — TELEPHONE ENCOUNTER
"Talked with pharmacy staff who stated that insurance will not cover Amphet-Dextroamphet 3-Bead ER (MYDAYIS) 25 MG CP24. When they put in generic it says not covered but when they put in the brand name it says \"PA required\" so they may cover brand name with a PA. Sending to provider for review and to see if a PA should be initiated.     Fatimah Morrow RN  "

## 2024-02-13 NOTE — TELEPHONE ENCOUNTER
Requested in separate encounter but unable to pull up refill - sent stimulant to new pharmacy w/ stock

## 2024-02-13 NOTE — TELEPHONE ENCOUNTER
[facility] phone call message- medication clarification/question:    Full Medication Name: Amphet-Dextroamphet 3-Bead ER (MYDAYIS) 25 MG CP24        Question: The pharmacy called; this medication is not covered by the patient's insurance. The pharmacy wants a call back to discuss alternatives.     Pharmacy confirmed as      [facility] [place], [place] - [address] [place] [address] [phone]    : Yes    OK to leave a message on voice mail? Yes    Primary language: English      needed? No    Call taken on February 13, 2024 at 4:08 PM by Sridevi Aleman

## 2024-02-14 ENCOUNTER — TELEPHONE (OUTPATIENT)
Dept: FAMILY MEDICINE | Facility: CLINIC | Age: 29
End: 2024-02-14

## 2024-02-14 NOTE — TELEPHONE ENCOUNTER
Prior Authorization Specialty Medication Request    Medication/Dose: Amphet-Dextroamphet 3-Bead ER (MYDAYIS) 25 MG CP24  Diagnosis and ICD code (if different than what is on RX):    New/renewal/insurance change PA/secondary ins. PA:  Previously Tried and Failed:      Important Lab Values:   Rationale:     Insurance   Primary: [employer]   Insurance ID:  [id number]     Secondary (if applicable):  Insurance ID:      Pharmacy Information (if different than what is on RX)  Name:    Phone:    Fax:

## 2024-02-14 NOTE — TELEPHONE ENCOUNTER
To clarify - do you know if the pharmacy staff meant that the brand name Mydayis would be potentially covered with a PA? Or did they mean brand name Adderall? Patient is specifically interested in trying Mydayis so I am trying to figure out best next steps and if PA will be helpful.

## 2024-02-15 ENCOUNTER — OFFICE VISIT (OUTPATIENT)
Dept: PLASTIC SURGERY | Facility: AMBULATORY SURGERY CENTER | Age: 29
End: 2024-02-15
Payer: COMMERCIAL

## 2024-02-15 DIAGNOSIS — Z98.82 HISTORY OF BREAST AUGMENTATION: Primary | ICD-10-CM

## 2024-02-15 PROCEDURE — 99213 OFFICE O/P EST LOW 20 MIN: CPT | Performed by: PLASTIC SURGERY

## 2024-02-15 NOTE — NURSING NOTE
Patient here today to discuss possible fat grafting for additional fullness. The patient also reports that they are concerned with lack of sensation from middle of breast down on bilateral sides. Provider to address today.      Vaishali Key RN on 2/15/2024 at 11:53 AM

## 2024-02-15 NOTE — PROGRESS NOTES
Ilan is a 28 years old patient status post prepectoral bilateral augmentation mammoplasty, utilizing 450 mL Allergan high-profile implants in the prepectoral space.  Surgery took place on May 2023.  She is 9-month out from surgery.    Patient had return today for reevaluation.  She was inquiring about potential fat grafting into her breasts.  Furthermore, she is concerned of numbness and paresthesias on bilateral nipple areolar complexes.    At the physical exam, there is persistent symmastia and inferior displacement of bilateral breast implant, with the nipple areolar complexes above the equator of each breast implant.  This is compatible with malposition.  Otherwise, Baker 1 capsular contracture (within normal limits) and no evidence of rippling.  There is no evidence of late seroma or hematoma.    Plan: I had a lengthy discussion with Ilan about fat grafting into her breast.  I have expressed to her that fat grafting will require at least 2 sessions because approximately 30 to 40% of the fat grafted will not survive and therefore a second or third session will be required.    She inquired about replacing the implants and using fat grafting.  I have explained that as far as volume, on her particular case, and because she is fit and very involved with working out activities, her level of lipodystrophy is minimal and I will not be able to provide enough fat grafting to provide a significant volume on her breasts.    For volume, I believe that implants are required.    I have explained to her that in my opinion, the main problem is implant malposition secondary to her very active upper body workouts including her pectoralis major muscles.  This exercises, in my opinion, were quite powerful due to the fact that even though the implants are prepectoral, there were displaced medially producing symmastia as well as inferiorly producing bottoming out.  Therefore, I do not believe is safe to continue to perform strenuous  workouts on her upper body.  Otherwise, she will have a recurrence of the symmastia and bottoming out.    I have recommended, instead of fat harvesting, replacing the implants as well as capsulorrhaphy medially to correct the symmastia and inferiorly to correct the inferior displacement of the implants with possible capsulotomy laterally and cranially.    As far as her numbness and paresthesias on the nipple areolar complexes, this is a well-known risk of bilateral breast augmentation and or reduction.  I recommend waiting because the numbness and paresthesias could recover and improve with time.    I would like to wait until May, which will be a whole full year before any revision.    She will come back in May to discuss surgery and at that time I will send case request.    Patient is in agreement with this plan.    Gareth Harrell MD , FACS   Diplomate American Board of Plastic Surgery  Diplomate American Board of Surgery  Adj. Assistant Professor of Surgery  Division of Plastic & Reconstructive Surgery   [facility]  Office: [phone]   2/15/2024 at 1:08 PM

## 2024-02-15 NOTE — LETTER
2/15/2024         RE: Ilan Bazzi  [address]  [place] [address]        Dear Colleague,    Thank you for referring your patient, Ilan Bazzi, to the [facility] [place]. Please see a copy of my visit note below.    Ilan is a 28 years old patient status post prepectoral bilateral augmentation mammoplasty, utilizing 450 mL Allergan high-profile implants in the prepectoral space.  Surgery took place on May 2023.  She is 9-month out from surgery.    Patient had return today for reevaluation.  She was inquiring about potential fat grafting into her breasts.  Furthermore, she is concerned of numbness and paresthesias on bilateral nipple areolar complexes.    At the physical exam, there is persistent symmastia and inferior displacement of bilateral breast implant, with the nipple areolar complexes above the equator of each breast implant.  This is compatible with malposition.  Otherwise, Baker 1 capsular contracture (within normal limits) and no evidence of rippling.  There is no evidence of late seroma or hematoma.    Plan: I had a lengthy discussion with Ilan about fat grafting into her breast.  I have expressed to her that fat grafting will require at least 2 sessions because approximately 30 to 40% of the fat grafted will not survive and therefore a second or third session will be required.    She inquired about replacing the implants and using fat grafting.  I have explained that as far as volume, on her particular case, and because she is fit and very involved with working out activities, her level of lipodystrophy is minimal and I will not be able to provide enough fat grafting to provide a significant volume on her breasts.    For volume, I believe that implants are required.    I have explained to her that in my opinion, the main problem is implant malposition secondary to her very active upper body workouts including her pectoralis major muscles.  This exercises,  Called [facility] and spoke with Malka Fairbanks who stated room has not been cleaned yet and he will have the nurse call back as soon as its ready. in my opinion, were quite powerful due to the fact that even though the implants are prepectoral, there were displaced medially producing symmastia as well as inferiorly producing bottoming out.  Therefore, I do not believe is safe to continue to perform strenuous workouts on her upper body.  Otherwise, she will have a recurrence of the symmastia and bottoming out.    I have recommended, instead of fat harvesting, replacing the implants as well as capsulorrhaphy medially to correct the symmastia and inferiorly to correct the inferior displacement of the implants with possible capsulotomy laterally and cranially.    As far as her numbness and paresthesias on the nipple areolar complexes, this is a well-known risk of bilateral breast augmentation and or reduction.  I recommend waiting because the numbness and paresthesias could recover and improve with time.    I would like to wait until May, which will be a whole full year before any revision.    She will come back in May to discuss surgery and at that time I will send case request.    Patient is in agreement with this plan.    Gareth Harrell MD , FACS   Diplomate American Board of Plastic Surgery  Diplomate American Board of Surgery  Adj. Assistant Professor of Surgery  Division of Plastic & Reconstructive Surgery   [facility]  Office: [phone]   2/15/2024 at 1:08 PM       Again, thank you for allowing me to participate in the care of your patient.        Sincerely,        Gareth Harrell MD

## 2024-02-25 NOTE — TELEPHONE ENCOUNTER
FUTURE VISIT INFORMATION      FUTURE VISIT INFORMATION:    Date: 8/27/19    Time: 11:30am    [facility]  REFERRAL INFORMATION:    Referring provider:  Mendy    Referring providers clinic:  [facility]    Reason for visit/diagnosis  Chalazion of left upper eyelid      RECORDS REQUESTED FROM:         Clinic name Comments Records Status Imaging Status   [facility] OV 8/13/19 [facility] EP[facility]          Patient Name: Herbert Harley  : 1956    MRN: [id number]                              Today's Date: 3/7/2023       Admit Date: 3/1/2023    Visit Dx:     ICD-10-CM ICD-9-CM   1. Coronary artery disease involving native coronary artery of native heart without angina pectoris  I25.10 414.01   2. Coronary artery disease involving native coronary artery of native heart, unspecified whether angina present  I25.10 414.01   3. Acute renal failure, unspecified acute renal failure type (HCC)  N17.9 584.9   4. Acute respiratory failure with hypoxia (HCC)  J96.01 518.81     Patient Active Problem List   Diagnosis   • Coronary artery disease involving native coronary artery of native heart without angina pectoris   • Hypertension   • Hyperlipidemia LDL goal <70   • Type 2 diabetes mellitus with hypoglycemia, with long-term current use of insulin (HCC)   • Ischemic cardiomyopathy   • Allergic rhinitis   • Positive cardiac stress test   • Mucopurulent chronic bronchitis (HCC)   • Prolonged Q-T interval on ECG   • Acute renal failure (ARF) (HCC)   • Shock (HCC)   • Acute respiratory failure with hypoxia (HCC)     Past Medical History:   Diagnosis Date   • Acute renal failure (ARF) (HCC) 3/2/2023   • Asthma 2022   • Chronic systolic congestive heart failure (HCC) 3/1/2023   • Coronary artery disease 2014   • Elevated cholesterol    • History of tobacco abuse    • HLD (hyperlipidemia) 2023   • Hypertension    • Ischemic cardiomyopathy    • Myocardial infarction (HCC) 2014   • Prolonged Q-T interval on ECG 3/1/2023   • Type 2 diabetes mellitus (HCC)    • Wears glasses      Past Surgical History:   Procedure Laterality Date   • CARDIAC CATHETERIZATION  2014   • CARDIAC CATHETERIZATION Left 2023    Procedure: Left Heart Cath;  Surgeon: Nadya Ramirez MD;  Location: [facility] CATH INVASIVE LOCATION;  Service: Cardiology;  Laterality: Left;   • CORONARY ARTERY BYPASS GRAFT N/A 3/1/2023     Procedure: MEDIAN STERNOTOMY CORONARY ARTERY BYPASS GRAFTING X3  UTILIZING THE LEFT INTERNAL MAMMERY GRAFT, AND EVH OF THE GREATER RIGHT SAPHENOUS VEIN;  Surgeon: Paresh Saavedra MD;  Location: [facility];  Service: Cardiothoracic;  Laterality: N/A;   • CORONARY STENT PLACEMENT  March 2014   • TONSILLECTOMY        General Information     Row Name 03/07/23 1142          Physical Therapy Time and Intention    Document Type therapy note (daily note)  -KG     Mode of Treatment physical therapy  -KG     Row Name 03/07/23 1142          General Information    Existing Precautions/Restrictions cardiac;fall;oxygen therapy device and L/min;sternal;other (see comments)   CRRT  -KG     Barriers to Rehab medically complex;cognitive status  -KG     Row Name 03/07/23 1142          Cognition    Orientation Status (Cognition) oriented x 3  -KG     Row Name 03/07/23 1142          Safety Issues, Functional Mobility    Safety Issues Affecting Function (Mobility) awareness of need for assistance;insight into deficits/self-awareness;safety precaution awareness;safety precautions follow-through/compliance  -KG     Impairments Affecting Function (Mobility) balance;coordination;endurance/activity tolerance;postural/trunk control;shortness of breath;strength  -KG           User Key  (r) = Recorded By, (t) = Taken By, (c) = Cosigned By    Initials Name Provider Type    KG Whitney Mistry, PT Physical Therapist               Mobility     Row Name 03/07/23 1142          Bed Mobility    Bed Mobility scooting/bridging;sit-supine  -KG     Scooting/Bridging [place] (Bed Mobility) maximum assist (25% patient effort);2 person assist;verbal cues  -KG     Sit-Supine [place] (Bed Mobility) maximum assist (25% patient effort);2 person assist;verbal cues  -KG     Assistive Device (Bed Mobility) draw sheet;head of bed elevated  -KG     Comment, (Bed Mobility) VC's for sequencing. Pt required increased assistance at trunk and BLEs.  -KG      [facility] Name 03/07/23 1142          Transfers    Comment, (Transfers) VC's for sequencing and safe hand placement to maintain sternal precautions. Pt unable to achieve full upright posture. VC's for trunk extension with forward gaze. Pt able to take shuffled side steps to the R with max verbal and tactile cues. Very unsteady with increased difficulty weight shifting. Unable to take forward steps.  -KG     Row Name 03/07/23 1142          Sit-Stand Transfer    Sit-Stand [place] (Transfers) maximum assist (25% patient effort);2 person assist;verbal cues  -KG     Row Name 03/07/23 1142          Gait/Stairs (Locomotion)    [place] Level (Gait) unable to assess  -KG     Comment, (Gait/Stairs) Ambulation deferred. Pt unable to weight shift to take forward steps.  -KG           User Key  (r) = Recorded By, (t) = Taken By, (c) = Cosigned By    Initials Name Provider Type    KG Whitney Mistry, PT Physical Therapist               Obj/Interventions     Row Name 03/07/23 1144          Balance    Balance Assessment sitting static balance;standing static balance;standing dynamic balance  -KG     Static Sitting Balance minimal assist  -KG     Position, Sitting Balance supported;sitting in chair  -KG     Static Standing Balance moderate assist;2-person assist  -KG     Dynamic Standing Balance maximum assist;2-person assist  -KG     Position/Device Used, Standing Balance supported  -KG           User Key  (r) = Recorded By, (t) = Taken By, (c) = Cosigned By    Initials Name Provider Type    KG Whitney Mistry, PT Physical Therapist               Goals/Plan    No documentation.                Clinical Impression     Row Name 03/07/23 1144          Pain    Pretreatment Pain Rating 0/10 - no pain  -KG     Posttreatment Pain Rating 0/10 - no pain  -KG     Row Name 03/07/23 1144          Plan of Care Review    Plan of Care Reviewed With patient  -KG     Progress improving  -KG     Outcome Evaluation Pt performed STS  with maxA x2 and took shuffled side steps with maxA x2 and B UE support. Pt demonstrated very forward flexed posture with increased difficulty weight shifting. Very unsteady throughout mobility. Continue to recommend PT skilled care.  -KG     Row Name 03/07/23 1144          Vital Signs    Pre Systolic BP Rehab 86  -KG     Pre Treatment Diastolic BP 68  -KG     Post Systolic BP Rehab 76  RN present and aware  -KG     Post Treatment Diastolic BP 49  -KG     Pretreatment Heart Rate (beats/min) 104  -KG     Posttreatment Heart Rate (beats/min) 112  -KG     Pre SpO2 (%) 93  -KG     O2 Delivery Pre Treatment room air  -KG     Post SpO2 (%) 91  -KG     O2 Delivery Post Treatment room air  -KG     Pre Patient Position Sitting  -KG     Intra Patient Position Standing  -KG     Post Patient Position Supine  -KG     Row Name 03/07/23 1144          Positioning and Restraints    Pre-Treatment Position sitting in chair/recliner  -KG     Post Treatment Position bed  -KG     In Bed notified nsg;supine;call light within reach;encouraged to call for assist;with family/caregiver;side rails up x3;RUE elevated;LUE elevated  -KG           User Key  (r) = Recorded By, (t) = Taken By, (c) = Cosigned By    Initials Name Provider Type    KG Whitney Mistry, PT Physical Therapist               Outcome Measures     Row Name 03/07/23 1146          How much help from another person do you currently need...    Turning from your back to your side while in flat bed without using bedrails? 2  -KG     Moving from lying on back to sitting on the side of a flat bed without bedrails? 2  -KG     Moving to and from a bed to a chair (including a wheelchair)? 2  -KG     Standing up from a chair using your arms (e.g., wheelchair, bedside chair)? 2  -KG     Climbing 3-5 steps with a railing? 1  -KG     To walk in hospital room? 1  -KG     AM-PAC 6 Clicks Score (PT) 10  -KG     Highest level of mobility 4 --> Transferred to chair/commode  -KG     Row Name  03/07/23 1146          Functional Assessment    Outcome Measure Options AM-PAC 6 Clicks Basic Mobility (PT)  -KG           User Key  (r) = Recorded By, (t) = Taken By, (c) = Cosigned By    Initials Name Provider Type    KG Whitney Mistry PT Physical Therapist                             Physical Therapy Education     Title: PT OT SLP Therapies (In Progress)     Topic: Physical Therapy (In Progress)     Point: Mobility training (In Progress)     Learning Progress Summary           Patient Acceptance, E, NR by KG at 3/7/2023 0919    Acceptance, E, NR by KG at 3/6/2023 1324                   Point: Home exercise program (In Progress)     Learning Progress Summary           Patient Acceptance, E, NR by KG at 3/7/2023 0919                   Point: Body mechanics (In Progress)     Learning Progress Summary           Patient Acceptance, E, NR by KG at 3/7/2023 0919    Acceptance, E, NR by KG at 3/6/2023 1324                   Point: Precautions (In Progress)     Learning Progress Summary           Patient Acceptance, E, NR by KG at 3/7/2023 0919    Acceptance, E, NR by KG at 3/6/2023 1324                               User Key     Initials Effective Dates Name Provider Type Discipline    KG 05/22/20 -  Whitney Mistry PT Physical Therapist PT              PT Recommendation and Plan  Planned Therapy Interventions (PT): balance training, bed mobility training, gait training, strengthening, transfer training  Plan of Care Reviewed With: patient  Progress: improving  Outcome Evaluation: Pt performed STS with maxA x2 and took shuffled side steps with maxA x2 and B UE support. Pt demonstrated very forward flexed posture with increased difficulty weight shifting. Very unsteady throughout mobility. Continue to recommend PT skilled care.     Time Calculation:    PT Charges     Row Name 03/07/23 0919             Time Calculation    Start Time 0919  -KG      PT Received On 03/07/23  -KG      PT Goal Re-Cert Due Date  Time-based billing (NON-critical care) 03/16/23  -KG         Time Calculation- PT    Total Timed Code Minutes- PT 23 minute(s)  -KG         Timed Charges    61287 - PT Therapeutic Activity Minutes 23  -KG         Total Minutes    Timed Charges Total Minutes 23  -KG       Total Minutes 23  -KG            User Key  (r) = Recorded By, (t) = Taken By, (c) = Cosigned By    Initials Name Provider Type    KG Whitney Mistry, PT Physical Therapist              Therapy Charges for Today     Code Description Service Date Service Provider Modifiers Qty    56124338186 HC PT EVAL HIGH COMPLEXITY 4 3/6/2023 Whitney Mistry, PT GP 1    83463097652 HC PT THERAPEUTIC ACT EA 15 MIN 3/7/2023 Whitney Mistry, PT GP 2          PT G-Codes  Outcome Measure Options: AM-PAC 6 Clicks Basic Mobility (PT)  AM-PAC 6 Clicks Score (PT): 10  PT Discharge Summary  Anticipated Discharge Disposition (PT): inpatient rehabilitation facility    Shelly Mistry PT  3/7/2023

## 2024-02-27 NOTE — TELEPHONE ENCOUNTER
Retail Pharmacy Prior Authorization Team   Phone: [phone]    PA Initiation    Medication: AMPHET-DEXTROAMPHET 3-BEAD ER 25 MG PO CP24  Insurance Company: [employer] - Phone [phone] Fax [phone]  Pharmacy Filling the Rx: [facility] [place], [place] - [address] [place] [address] [phone]  Filling Pharmacy Phone: [phone]  Filling Pharmacy Fax:    Start Date: 2/27/2024

## 2024-02-27 NOTE — TELEPHONE ENCOUNTER
I called pharmacy to get clarification is they are requesting PA for generic or brand. According to encounter 2/13/24, PA for brand is ok with provider since pharmacy stated brand needs PA and generic is not covered. Pharmacy claim shows generic is in process. Per pharmacy staff, both generic and brand says they need PA and therefore they are request PA for generic. Submitting PA for generic.

## 2024-02-29 NOTE — TELEPHONE ENCOUNTER
Prior Authorization Approval    Medication: AMPHET-DEXTROAMPHET 3-BEAD ER 25 MG PO CP24  Authorization Effective Date: 2/28/2024  Authorization Expiration Date: 2/28/2025  Approved Dose/Quantity:   Reference #:     Insurance Company: [employer] - Phone [phone] Fax [phone]  Expected CoPay: $    CoPay Card Available:      Financial Assistance Needed:   Which Pharmacy is filling the prescription: [facility] [address] [id number]  Pharmacy Notified: Yes  Patient Notified: **Instructed pharmacy to notify patient when script is ready to /ship.**

## 2024-03-26 ENCOUNTER — MYC REFILL (OUTPATIENT)
Dept: FAMILY MEDICINE | Facility: CLINIC | Age: 29
End: 2024-03-26
Payer: COMMERCIAL

## 2024-03-26 DIAGNOSIS — F64.9 GENDER DYSPHORIA: ICD-10-CM

## 2024-03-26 DIAGNOSIS — F90.9 ATTENTION DEFICIT HYPERACTIVITY DISORDER (ADHD), UNSPECIFIED ADHD TYPE: ICD-10-CM

## 2024-03-26 RX ORDER — DEXTROAMPHETAMINE SACCHARATE, AMPHETAMINE ASPARTATE, DEXTROAMPHETAMINE SULFATE AND AMPHETAMINE SULFATE 2.5; 2.5; 2.5; 2.5 MG/1; MG/1; MG/1; MG/1
TABLET ORAL
Qty: 30 TABLET | Refills: 0 | Status: SHIPPED | OUTPATIENT
Start: 2024-03-26 | End: 2024-04-29

## 2024-03-26 RX ORDER — ESTRADIOL 2 MG/1
4 TABLET ORAL 2 TIMES DAILY
Qty: 120 TABLET | Refills: 5 | Status: SHIPPED | OUTPATIENT
Start: 2024-03-26 | End: 2024-10-01

## 2024-03-26 RX ORDER — DEXTROAMPHETAMINE SACCHARATE, AMPHETAMINE ASPARTATE MONOHYDRATE, DEXTROAMPHETAMINE SULFATE, AMPHETAMINE SULFATE 6.25; 6.25; 6.25; 6.25 MG/1; MG/1; MG/1; MG/1
25 CAPSULE, EXTENDED RELEASE ORAL DAILY
Qty: 30 CAPSULE | Refills: 0 | Status: SHIPPED | OUTPATIENT
Start: 2024-03-26 | End: 2024-04-29

## 2024-03-26 RX ORDER — ESTRADIOL 2 MG/1
4 TABLET ORAL 2 TIMES DAILY
Qty: 120 TABLET | Refills: 2 | Status: CANCELLED | OUTPATIENT
Start: 2024-03-26

## 2024-03-26 NOTE — TELEPHONE ENCOUNTER
"Request for medication refill: estradiol (ESTRACE) 2 MG tablet     Providers if patient needs an appointment and you are willing to give a one month supply please refill for one month and  send a letter/MyChart using \".SMILLIMITEDREFILL\" .smillimited and route chart to \"P SMI \" (Giving one month refill in non controlled medications is strongly recommended before denial)    If refill has been denied, meaning absolutely no refills without visit, please complete the smart phrase \".smirxrefuse\" and route it to the \"P SMI MED REFILLS\"  pool to inform the patient and the pharmacy.    Akanksha Yan, [facility]    "

## 2024-03-26 NOTE — TELEPHONE ENCOUNTER
"Last seen 2/1/24    Request for medication refill:    Providers if patient needs an appointment and you are willing to give a one month supply please refill for one month and  send a letter/MyChart using \".SMILLIMITEDREFILL\" .smillimited and route chart to \"P SMI \" (Giving one month refill in non controlled medications is strongly recommended before denial)    If refill has been denied, meaning absolutely no refills without visit, please complete the smart phrase \".smirxrefuse\" and route it to the \"P SMI MED REFILLS\"  pool to inform the patient and the pharmacy.    Janis Calderón RN          "

## 2024-04-29 ENCOUNTER — MYC MEDICAL ADVICE (OUTPATIENT)
Dept: FAMILY MEDICINE | Facility: CLINIC | Age: 29
End: 2024-04-29
Payer: COMMERCIAL

## 2024-04-29 DIAGNOSIS — F90.9 ATTENTION DEFICIT HYPERACTIVITY DISORDER (ADHD), UNSPECIFIED ADHD TYPE: ICD-10-CM

## 2024-04-29 RX ORDER — DEXTROAMPHETAMINE SACCHARATE, AMPHETAMINE ASPARTATE, DEXTROAMPHETAMINE SULFATE AND AMPHETAMINE SULFATE 2.5; 2.5; 2.5; 2.5 MG/1; MG/1; MG/1; MG/1
TABLET ORAL
Qty: 30 TABLET | Refills: 0 | Status: SHIPPED | OUTPATIENT
Start: 2024-04-29 | End: 2024-05-28

## 2024-04-29 RX ORDER — DEXTROAMPHETAMINE SACCHARATE, AMPHETAMINE ASPARTATE MONOHYDRATE, DEXTROAMPHETAMINE SULFATE, AMPHETAMINE SULFATE 6.25; 6.25; 6.25; 6.25 MG/1; MG/1; MG/1; MG/1
25 CAPSULE, EXTENDED RELEASE ORAL DAILY
Qty: 30 CAPSULE | Refills: 0 | Status: SHIPPED | OUTPATIENT
Start: 2024-04-29 | End: 2024-05-28

## 2024-04-29 NOTE — TELEPHONE ENCOUNTER
"Last seen 2/1/24    Request for medication refill:    Amphet-Dextroamphet 3-Bead ER (MYDAYIS) 25 MG CP24   amphetamine-dextroamphetamine (ADDERALL) 10 MG tablet     Providers if patient needs an appointment and you are willing to give a one month supply please refill for one month and  send a letter/MyChart using \".SMILLIMITEDREFILL\" .smillimited and route chart to \"P SMI \" (Giving one month refill in non controlled medications is strongly recommended before denial)    If refill has been denied, meaning absolutely no refills without visit, please complete the smart phrase \".smirxrefuse\" and route it to the \"P SMI MED REFILLS\"  pool to inform the patient and the pharmacy.    Abbey Roldan RN     "

## 2024-04-30 DIAGNOSIS — K60.2 ANAL FISSURE: ICD-10-CM

## 2024-04-30 DIAGNOSIS — K60.1 CHRONIC ANAL FISSURE: Primary | ICD-10-CM

## 2024-05-02 ENCOUNTER — OFFICE VISIT (OUTPATIENT)
Dept: PLASTIC SURGERY | Facility: AMBULATORY SURGERY CENTER | Age: 29
End: 2024-05-02
Payer: COMMERCIAL

## 2024-05-02 ENCOUNTER — TELEPHONE (OUTPATIENT)
Dept: PLASTIC SURGERY | Facility: CLINIC | Age: 29
End: 2024-05-02

## 2024-05-02 DIAGNOSIS — F64.9 GENDER DYSPHORIA: Primary | ICD-10-CM

## 2024-05-02 PROCEDURE — 99213 OFFICE O/P EST LOW 20 MIN: CPT | Performed by: PLASTIC SURGERY

## 2024-05-02 NOTE — PROGRESS NOTES
Ilan is a 28 years old patient status post prepectoral bilateral augmentation mammoplasty, utilizing 450 mL Allergan high-profile implants in the prepectoral space.  Surgery took place on May 2023.  She is 1 year out from surgery.    Patient has developed bilateral breast implant malposition due to heavy exercises with her pectoral muscles.    At the physical exam, there is persistent symmastia and inferior displacement of bilateral breast implant, with the nipple areolar complexes above the equator of each breast implant.  This is compatible with malposition.  Otherwise, Baker 1 capsular contracture (within normal limits) and no evidence of rippling.  She is happy with her volumes.                        Plan: Bilateral breast implant exchange for another 450 mL Allergan, high-profile implants in the prepectoral space, medial and inferior capsulorrhaphy and lateral and superior capsulotomy.    Patient has agreed to proceed.    I will send case request today.    Gareth Harrell MD , FACS   Diplomate American Board of Plastic Surgery  Diplomate American Board of Surgery  Adj. Assistant Professor of Surgery  Division of Plastic & Reconstructive Surgery   [facility]  Office: [phone]   5/2/2024 at 11:17 AM

## 2024-05-02 NOTE — LETTER
5/2/2024         RE: Ilan Bazzi  [address]  [place] [address]        Dear Colleague,    Thank you for referring your patient, Ilan Bazzi, to the [facility] [place]. Please see a copy of my visit note below.    Ilan is a 28 years old patient status post prepectoral bilateral augmentation mammoplasty, utilizing 450 mL Allergan high-profile implants in the prepectoral space.  Surgery took place on May 2023.  She is 1 year out from surgery.    Patient has developed bilateral breast implant malposition due to heavy exercises with her pectoral muscles.    At the physical exam, there is persistent symmastia and inferior displacement of bilateral breast implant, with the nipple areolar complexes above the equator of each breast implant.  This is compatible with malposition.  Otherwise, Baker 1 capsular contracture (within normal limits) and no evidence of rippling.  She is happy with her volumes.                        Plan: Bilateral breast implant exchange for another 450 mL Allergan, high-profile implants in the prepectoral space, medial and inferior capsulorrhaphy and lateral and superior capsulotomy.    Patient has agreed to proceed.    I will send case request today.    Gareth Harrell MD , FACS   Diplomate American Board of Plastic Surgery  Diplomate American Board of Surgery  Adj. Assistant Professor of Surgery  Division of Plastic & Reconstructive Surgery   [facility]  Office: [phone]   5/2/2024 at 11:17 AM         Again, thank you for allowing me to participate in the care of your patient.        Sincerely,        Gareth Harrell MD

## 2024-05-08 ENCOUNTER — TELEPHONE (OUTPATIENT)
Dept: PLASTIC SURGERY | Facility: CLINIC | Age: 29
End: 2024-05-08
Payer: COMMERCIAL

## 2024-05-08 NOTE — TELEPHONE ENCOUNTER
"Writer spoke to patient stating a letter of support is required to demonstrate that there is continued gender dysphoria. Patient stated they have three different providers who could write the letter on her behalf. Writer will send an email outlining the LOS/DA requirements to share with her provider.    Post Call Email correspondence:    Tobias Talavera,    To follow up on our call, we will need one letter of support and a diagnostic assessment to submit to insurance for your prior authorization. It is important that both of these document continued gender dysphoria due to remaining chest tissue needing removal and/or not having a flat chest area. In many cases, insurance have considered revisions as \"cosmetic\" but if a case can be made it is medically necessary, you'd have a good chance at pushing back for the revision.      If your provider has any questions about what is needed in the letter, they are free to email or call me (see below for contact information).    Thanks,    Lenka  "

## 2024-05-13 NOTE — TELEPHONE ENCOUNTER
Diagnosis, Referred by & from: Anal Fissure   Appt date: 3/7/2023   NOTES STATUS DETAILS   OFFICE NOTE from referring provider Internal [facility]:  1/13/23 - MyChart referral from Dr. Hudson  7/28/22, 4/20/22 - [facility] OV with Dr. Hudson   OFFICE NOTE from other specialist N/A    DISCHARGE SUMMARY from hospital N/A    DISCHARGE REPORT from the ER N/A    OPERATIVE REPORT N/A    MEDICATION LIST Internal    LABS N/A    DIAGNOSTIC PROCEDURES N/A    IMAGING (DISC & REPORT)      ULTRASOUND  (ENDOANAL/ENDORECTAL) Internal [facility]:  10/20/20 - US Testicular/Scrotum     
0 = understands/communicates without difficulty

## 2024-05-27 PROBLEM — F41.9 MILD ANXIETY: Status: RESOLVED | Noted: 2020-10-16 | Resolved: 2024-05-27

## 2024-05-27 PROBLEM — N52.2 DRUG-INDUCED ERECTILE DYSFUNCTION: Status: RESOLVED | Noted: 2022-05-16 | Resolved: 2024-05-27

## 2024-05-27 PROBLEM — M26.02 MAXILLARY HYPOPLASIA: Status: RESOLVED | Noted: 2017-06-08 | Resolved: 2024-05-27

## 2024-05-28 ENCOUNTER — OFFICE VISIT (OUTPATIENT)
Dept: FAMILY MEDICINE | Facility: CLINIC | Age: 29
End: 2024-05-28
Payer: COMMERCIAL

## 2024-05-28 VITALS
DIASTOLIC BLOOD PRESSURE: 71 MMHG | OXYGEN SATURATION: 98 % | BODY MASS INDEX: 23.9 KG/M2 | HEART RATE: 102 BPM | RESPIRATION RATE: 18 BRPM | SYSTOLIC BLOOD PRESSURE: 103 MMHG | HEIGHT: 71 IN | WEIGHT: 170.7 LBS

## 2024-05-28 DIAGNOSIS — R00.0 TACHYCARDIA: ICD-10-CM

## 2024-05-28 DIAGNOSIS — F64.9 GENDER DYSPHORIA: ICD-10-CM

## 2024-05-28 DIAGNOSIS — F90.9 ATTENTION DEFICIT HYPERACTIVITY DISORDER (ADHD), UNSPECIFIED ADHD TYPE: Primary | ICD-10-CM

## 2024-05-28 DIAGNOSIS — F32.A DEPRESSION, UNSPECIFIED DEPRESSION TYPE: ICD-10-CM

## 2024-05-28 PROBLEM — F64.0 TRANSSEXUALISM: Status: RESOLVED | Noted: 2020-12-09 | Resolved: 2024-05-28

## 2024-05-28 LAB
AMPHETAMINES UR QL: DETECTED
ANION GAP SERPL CALCULATED.3IONS-SCNC: 9 MMOL/L (ref 7–15)
BARBITURATES UR QL SCN: NOT DETECTED
BENZODIAZ UR QL SCN: NOT DETECTED
BUN SERPL-MCNC: 12.1 MG/DL (ref 6–20)
BUPRENORPHINE UR QL: NOT DETECTED
CALCIUM SERPL-MCNC: 9.6 MG/DL (ref 8.6–10)
CANNABINOIDS UR QL: NOT DETECTED
CHLORIDE SERPL-SCNC: 103 MMOL/L (ref 98–107)
COCAINE UR QL SCN: NOT DETECTED
CREAT SERPL-MCNC: 1.04 MG/DL (ref 0.51–1.17)
D-METHAMPHET UR QL: NOT DETECTED
DEPRECATED HCO3 PLAS-SCNC: 27 MMOL/L (ref 22–29)
EGFRCR SERPLBLD CKD-EPI 2021: 74 ML/MIN/1.73M2
ESTRADIOL SERPL-MCNC: 109 PG/ML
GLUCOSE SERPL-MCNC: 63 MG/DL (ref 70–99)
HGB BLD-MCNC: 15.8 G/DL (ref 11.7–17.7)
METHADONE UR QL SCN: NOT DETECTED
OPIATES UR QL SCN: DETECTED
OXYCODONE UR QL SCN: NOT DETECTED
PCP UR QL SCN: NOT DETECTED
POTASSIUM SERPL-SCNC: 4.2 MMOL/L (ref 3.4–5.3)
SODIUM SERPL-SCNC: 139 MMOL/L (ref 135–145)
TRICYCLICS UR QL SCN: NOT DETECTED

## 2024-05-28 PROCEDURE — 90471 IMMUNIZATION ADMIN: CPT | Performed by: STUDENT IN AN ORGANIZED HEALTH CARE EDUCATION/TRAINING PROGRAM

## 2024-05-28 PROCEDURE — 80048 BASIC METABOLIC PNL TOTAL CA: CPT | Performed by: STUDENT IN AN ORGANIZED HEALTH CARE EDUCATION/TRAINING PROGRAM

## 2024-05-28 PROCEDURE — 82670 ASSAY OF TOTAL ESTRADIOL: CPT | Mod: KX | Performed by: STUDENT IN AN ORGANIZED HEALTH CARE EDUCATION/TRAINING PROGRAM

## 2024-05-28 PROCEDURE — 80306 DRUG TEST PRSMV INSTRMNT: CPT | Performed by: STUDENT IN AN ORGANIZED HEALTH CARE EDUCATION/TRAINING PROGRAM

## 2024-05-28 PROCEDURE — 90746 HEPB VACCINE 3 DOSE ADULT IM: CPT | Performed by: STUDENT IN AN ORGANIZED HEALTH CARE EDUCATION/TRAINING PROGRAM

## 2024-05-28 PROCEDURE — 84403 ASSAY OF TOTAL TESTOSTERONE: CPT | Mod: KX | Performed by: STUDENT IN AN ORGANIZED HEALTH CARE EDUCATION/TRAINING PROGRAM

## 2024-05-28 PROCEDURE — 99215 OFFICE O/P EST HI 40 MIN: CPT | Mod: 25 | Performed by: STUDENT IN AN ORGANIZED HEALTH CARE EDUCATION/TRAINING PROGRAM

## 2024-05-28 PROCEDURE — 85018 HEMOGLOBIN: CPT | Performed by: STUDENT IN AN ORGANIZED HEALTH CARE EDUCATION/TRAINING PROGRAM

## 2024-05-28 PROCEDURE — 36415 COLL VENOUS BLD VENIPUNCTURE: CPT | Performed by: STUDENT IN AN ORGANIZED HEALTH CARE EDUCATION/TRAINING PROGRAM

## 2024-05-28 RX ORDER — DEXTROAMPHETAMINE SACCHARATE, AMPHETAMINE ASPARTATE, DEXTROAMPHETAMINE SULFATE AND AMPHETAMINE SULFATE 2.5; 2.5; 2.5; 2.5 MG/1; MG/1; MG/1; MG/1
TABLET ORAL
Qty: 30 TABLET | Refills: 0 | Status: SHIPPED | OUTPATIENT
Start: 2024-05-28 | End: 2024-05-28

## 2024-05-28 RX ORDER — DEXTROAMPHETAMINE SACCHARATE, AMPHETAMINE ASPARTATE MONOHYDRATE, DEXTROAMPHETAMINE SULFATE, AMPHETAMINE SULFATE 6.25; 6.25; 6.25; 6.25 MG/1; MG/1; MG/1; MG/1
25 CAPSULE, EXTENDED RELEASE ORAL DAILY
Qty: 30 CAPSULE | Refills: 0 | Status: SHIPPED | OUTPATIENT
Start: 2024-05-28 | End: 2024-05-28

## 2024-05-28 RX ORDER — DEXTROAMPHETAMINE SACCHARATE, AMPHETAMINE ASPARTATE MONOHYDRATE, DEXTROAMPHETAMINE SULFATE, AMPHETAMINE SULFATE 6.25; 6.25; 6.25; 6.25 MG/1; MG/1; MG/1; MG/1
25 CAPSULE, EXTENDED RELEASE ORAL DAILY
Qty: 30 CAPSULE | Refills: 0 | Status: SHIPPED | OUTPATIENT
Start: 2024-05-28 | End: 2024-06-26

## 2024-05-28 RX ORDER — DEXTROAMPHETAMINE SACCHARATE, AMPHETAMINE ASPARTATE, DEXTROAMPHETAMINE SULFATE AND AMPHETAMINE SULFATE 2.5; 2.5; 2.5; 2.5 MG/1; MG/1; MG/1; MG/1
TABLET ORAL
Qty: 30 TABLET | Refills: 0 | Status: SHIPPED | OUTPATIENT
Start: 2024-05-28 | End: 2024-06-26

## 2024-05-28 RX ORDER — VENLAFAXINE HYDROCHLORIDE 150 MG/1
150 TABLET, EXTENDED RELEASE ORAL DAILY
Qty: 30 TABLET | Refills: 11 | Status: CANCELLED | OUTPATIENT
Start: 2024-05-28

## 2024-05-28 RX ORDER — DEXTROAMPHETAMINE SACCHARATE, AMPHETAMINE ASPARTATE, DEXTROAMPHETAMINE SULFATE AND AMPHETAMINE SULFATE 2.5; 2.5; 2.5; 2.5 MG/1; MG/1; MG/1; MG/1
TABLET ORAL
Qty: 30 TABLET | Refills: 0 | Status: SHIPPED | OUTPATIENT
Start: 2024-06-28 | End: 2024-05-28

## 2024-05-28 RX ORDER — DEXTROAMPHETAMINE SACCHARATE, AMPHETAMINE ASPARTATE, DEXTROAMPHETAMINE SULFATE AND AMPHETAMINE SULFATE 2.5; 2.5; 2.5; 2.5 MG/1; MG/1; MG/1; MG/1
TABLET ORAL
Qty: 30 TABLET | Refills: 0 | Status: CANCELLED | OUTPATIENT
Start: 2024-05-28

## 2024-05-28 RX ORDER — VENLAFAXINE HYDROCHLORIDE 150 MG/1
150 TABLET, EXTENDED RELEASE ORAL DAILY
Qty: 90 TABLET | Refills: 3 | Status: SHIPPED | OUTPATIENT
Start: 2024-05-28

## 2024-05-28 RX ORDER — DEXTROAMPHETAMINE SACCHARATE, AMPHETAMINE ASPARTATE MONOHYDRATE, DEXTROAMPHETAMINE SULFATE, AMPHETAMINE SULFATE 6.25; 6.25; 6.25; 6.25 MG/1; MG/1; MG/1; MG/1
25 CAPSULE, EXTENDED RELEASE ORAL DAILY
Qty: 30 CAPSULE | Refills: 0 | Status: SHIPPED | OUTPATIENT
Start: 2024-06-28 | End: 2024-05-28

## 2024-05-28 ASSESSMENT — PATIENT HEALTH QUESTIONNAIRE - PHQ9
10. IF YOU CHECKED OFF ANY PROBLEMS, HOW DIFFICULT HAVE THESE PROBLEMS MADE IT FOR YOU TO DO YOUR WORK, TAKE CARE OF THINGS AT HOME, OR GET ALONG WITH OTHER PEOPLE: SOMEWHAT DIFFICULT
SUM OF ALL RESPONSES TO PHQ QUESTIONS 1-9: 2
SUM OF ALL RESPONSES TO PHQ QUESTIONS 1-9: 2

## 2024-05-28 NOTE — LETTER
May 28, 2024      Re: Ilan Bazzi  1995     [address]  [place] [address]        To Whom It May Concern:     Ilan Bazzi is an established patient with me; I have been her primary care physician since 2021. Ilan has a diagnosis of gender dysphoria which is managed with interventions including feminizing hormone replacement therapy and surgical interventions.     Ilan underwent medically indicated gender-affirming breast surgery for treatment of her gender dysphoria in May of 2023. Unfortunately, Ilan has experienced malposition of her breast implants which has resulted in significant gender dysphoria due to her breasts not appearing feminine or typical of a feminized body's breasts. Both the location of the implants and the gapping in her mid-chest between the implants causes gender dysphoria due to its abnormal appearance. She also has concerns for her physical safety as a trans woman, as when she is in public the unnatural appearance of her breasts is obvious and makes it difficult for her to pass in public.     For treatment of her breast implant migration, Ilan's Plastic Surgeon has recommended replacement of her breast implants. In accordance with [facility] Standards of Care (SOC) 8, Ilan's breast surgery (both the original breast surgery as well as this recommended replacement) is medically indicated for treatment of her gender dysphoria. She is an appropriate candidate for the procedure and as the provider managing her gender-affirming hormone therapy, I am in full support of her pursuing this treatment.     Please contact me with any additional questions or clarifications.        Sincerely,    Sofy Hudson DO

## 2024-05-28 NOTE — PROGRESS NOTES
"  Assessment & Plan     Attention deficit hyperactivity disorder (ADHD), unspecified ADHD type  Well controlled on current regimen. Q6mos visits.   - Amphet-Dextroamphet 3-Bead ER (MYDAYIS) 25 MG CP24  Dispense: 30 capsule; Refill: 0  - Urine Drug Screen [facility]  - amphetamine-dextroamphetamine (ADDERALL) 10 MG tablet  Dispense: 30 tablet; Refill: 0    Depression, unspecified depression type  TOMÁS  Well controlled on current med regimen   - venlafaxine (EFFEXOR-ER) 150 MG 24 hr tablet  Dispense: 90 tablet; Refill: 3    Gender dysphoria  Stable on HRT regimen. Due for annual labs. Upcoming top surgery revision planned w/ Plastics due to implant malposition causing significant dysphoria. Will write letter outlining/supporting medical indications for surgery. Consider discussing reducing risk of recurrent migration w/ Plastic Surgeon, could consider PT referral for guidance on exercises to avoid if Surgeon has suggestions   - Hemoglobin  - Estradiol  - Testosterone total  - Basic metabolic panel    Tachycardia  Often low 100s here in clinic, regular on auscultation. Fitbit shows average resting HR in 60s-low 70s, is accurate with  here this AM. Asymptomatic. Will CTM.         46 minutes spent by me on the date of the encounter doing chart review, history and exam, documentation, writing letter of support, and further activities per the note    Return in about 6 months (around 11/28/2024) for Follow up, with me.    Kameron Talavera is a 28 year old, presenting for the following health issues:  Follow Up (Medication follow up )    HPI         From [facility]:   Getting top surgery revision, needs letter:   \"To follow up on our call, we will need one letter of support and a diagnostic assessment to submit to insurance for your prior authorization. It is important that both of these document continued gender dysphoria due to remaining chest tissue needing removal and/or not having a flat chest area. In many cases, " "insurance have considered revisions as \"cosmetic\" but if a case can be made it is medically necessary, you'd have a good chance at pushing back for the revision.\"    Dysphoria: top surgery revision for displaced implants   Gap between the implants looks unnatural  Also a safety concern in terms of presenting femme       Paresthesias: very bizarre - through traps up neck, not into any of th muscles of the head but if she turned her head a certain way there was a tingly pins and needles, sometimes came with a 1/2 second of \"cognizance turning off and turning back on again\"   Messaged me after a day or two   Had happened before w/ head turning but not w/ the consciousness   Trying to stretch upper back muscles more       Mydais has felt more effective, less \"harsh\"  Helpful for both tasks up and about and tasks sitting down. Feels more helpful in terms of getting over task initiation hump, stay on task without feeling anxious or jittery or tense     Using the 10mg pm as needed if needing more to get something done. No concern for jittery SE.   Sleeping okay, no issues with falling asleep      Average HR mid 60s         2/17/2022     8:17 AM 4/20/2022    11:51 AM 5/16/2022     9:56 AM   TOMÁS-7 SCORE   Total Score   0 (minimal anxiety)   Total Score 4 3 0            9/22/2023     9:35 AM 2/1/2024     1:41 PM 5/28/2024     7:59 AM   PHQ   PHQ-9 Total Score 10 8 2   Q9: Thoughts of better off dead/self-harm past 2 weeks Not at all Not at all Not at all         Review of Systems  Pertinent positives and negatives per HPI.        Objective    /71 (BP Location: Left arm, Patient Position: Sitting, Cuff Size: Adult Regular)   Pulse 102   Resp 18   Ht 1.803 m (5' 11\")   Wt 77.4 kg (170 lb 11.2 oz)   SpO2 98%   BMI 23.81 kg/m    Body mass index is 23.81 kg/m .  Physical Exam   GENERAL: alert and no distress  EYES: Eyes grossly normal to inspection, PERRL and conjunctivae and sclerae normal  RESP: lungs clear to " auscultation - no rales, rhonchi or wheezes  CV: regular rate and rhythm, normal S1 S2, no S3 or S4, no murmur, click or rub, no peripheral edema  MS: no gross musculoskeletal defects noted, no edema  NEURO: Normal strength and tone, mentation intact and speech normal    No results found for this or any previous visit (from the past 24 hour(s)).        Signed Electronically by: Sofy Hudson DO    Answers submitted by the patient for this visit:  Patient Health Questionnaire (Submitted on 5/28/2024)  If you checked off any problems, how difficult have these problems made it for you to do your work, take care of things at home, or get along with other people?: Somewhat difficult  PHQ9 TOTAL SCORE: 2

## 2024-05-30 LAB — TESTOST SERPL-MCNC: 873 NG/DL (ref 8–950)

## 2024-06-26 ENCOUNTER — MYC REFILL (OUTPATIENT)
Dept: FAMILY MEDICINE | Facility: CLINIC | Age: 29
End: 2024-06-26
Payer: COMMERCIAL

## 2024-06-26 DIAGNOSIS — F90.9 ATTENTION DEFICIT HYPERACTIVITY DISORDER (ADHD), UNSPECIFIED ADHD TYPE: ICD-10-CM

## 2024-06-26 NOTE — TELEPHONE ENCOUNTER
"Request for medication refill:  Amphet-Dextroamphet 3-Bead ER (MYDAYIS) 25 MG CP24     amphetamine-dextroamphetamine (ADDERALL) 10 MG tablet     Providers if patient needs an appointment and you are willing to give a one month supply please refill for one month and  send a letter/MyChart using \".SMILLIMITEDREFILL\" .smillimited and route chart to \"P [facility] \" (Giving one month refill in non controlled medications is strongly recommended before denial)    If refill has been denied, meaning absolutely no refills without visit, please complete the smart phrase \".smirxrefuse\" and route it to the \"P [facility] MED REFILLS\"  pool to inform the patient and the pharmacy.    Raghu Cisneros, CMA      "

## 2024-06-28 RX ORDER — DEXTROAMPHETAMINE SACCHARATE, AMPHETAMINE ASPARTATE MONOHYDRATE, DEXTROAMPHETAMINE SULFATE, AMPHETAMINE SULFATE 6.25; 6.25; 6.25; 6.25 MG/1; MG/1; MG/1; MG/1
25 CAPSULE, EXTENDED RELEASE ORAL DAILY
Qty: 30 CAPSULE | Refills: 0 | Status: SHIPPED | OUTPATIENT
Start: 2024-06-28 | End: 2024-08-01

## 2024-06-28 RX ORDER — DEXTROAMPHETAMINE SACCHARATE, AMPHETAMINE ASPARTATE, DEXTROAMPHETAMINE SULFATE AND AMPHETAMINE SULFATE 2.5; 2.5; 2.5; 2.5 MG/1; MG/1; MG/1; MG/1
TABLET ORAL
Qty: 30 TABLET | Refills: 0 | Status: SHIPPED | OUTPATIENT
Start: 2024-06-28 | End: 2024-08-01

## 2024-07-11 ENCOUNTER — TELEPHONE (OUTPATIENT)
Dept: PLASTIC SURGERY | Facility: CLINIC | Age: 29
End: 2024-07-11
Payer: COMMERCIAL

## 2024-07-30 ENCOUNTER — MYC MEDICAL ADVICE (OUTPATIENT)
Dept: FAMILY MEDICINE | Facility: CLINIC | Age: 29
End: 2024-07-30
Payer: COMMERCIAL

## 2024-07-30 DIAGNOSIS — F90.9 ATTENTION DEFICIT HYPERACTIVITY DISORDER (ADHD), UNSPECIFIED ADHD TYPE: ICD-10-CM

## 2024-08-01 RX ORDER — DEXTROAMPHETAMINE SACCHARATE, AMPHETAMINE ASPARTATE, DEXTROAMPHETAMINE SULFATE AND AMPHETAMINE SULFATE 2.5; 2.5; 2.5; 2.5 MG/1; MG/1; MG/1; MG/1
TABLET ORAL
Qty: 30 TABLET | Refills: 0 | Status: SHIPPED | OUTPATIENT
Start: 2024-08-01 | End: 2024-10-01

## 2024-08-01 RX ORDER — DEXTROAMPHETAMINE SACCHARATE, AMPHETAMINE ASPARTATE, DEXTROAMPHETAMINE SULFATE AND AMPHETAMINE SULFATE 2.5; 2.5; 2.5; 2.5 MG/1; MG/1; MG/1; MG/1
TABLET ORAL
Qty: 30 TABLET | Refills: 0 | Status: SHIPPED | OUTPATIENT
Start: 2024-09-01

## 2024-08-01 RX ORDER — DEXTROAMPHETAMINE SACCHARATE, AMPHETAMINE ASPARTATE MONOHYDRATE, DEXTROAMPHETAMINE SULFATE, AMPHETAMINE SULFATE 6.25; 6.25; 6.25; 6.25 MG/1; MG/1; MG/1; MG/1
25 CAPSULE, EXTENDED RELEASE ORAL DAILY
Qty: 30 CAPSULE | Refills: 0 | Status: SHIPPED | OUTPATIENT
Start: 2024-08-01 | End: 2024-10-01

## 2024-08-01 RX ORDER — DEXTROAMPHETAMINE SACCHARATE, AMPHETAMINE ASPARTATE MONOHYDRATE, DEXTROAMPHETAMINE SULFATE, AMPHETAMINE SULFATE 6.25; 6.25; 6.25; 6.25 MG/1; MG/1; MG/1; MG/1
25 CAPSULE, EXTENDED RELEASE ORAL DAILY
Qty: 30 CAPSULE | Refills: 0 | Status: SHIPPED | OUTPATIENT
Start: 2024-09-01

## 2024-08-01 NOTE — TELEPHONE ENCOUNTER
"Last seen 5/28/24    Request for medication refill:    Amphet-Dextroamphet 3-Bead ER (MYDAYIS) 25 MG CP24   amphetamine-dextroamphetamine (ADDERALL) 10 MG tablet     Providers if patient needs an appointment and you are willing to give a one month supply please refill for one month and  send a letter/MyChart using \".SMILLIMITEDREFILL\" .smillimited and route chart to \"P [facility] \" (Giving one month refill in non controlled medications is strongly recommended before denial)    If refill has been denied, meaning absolutely no refills without visit, please complete the smart phrase \".smirxrefuse\" and route it to the \"P [facility] MED REFILLS\"  pool to inform the patient and the pharmacy.    Abbey Roldan RN     "

## 2024-08-02 ENCOUNTER — TELEPHONE (OUTPATIENT)
Dept: FAMILY MEDICINE | Facility: CLINIC | Age: 29
End: 2024-08-02
Payer: COMMERCIAL

## 2024-08-02 NOTE — TELEPHONE ENCOUNTER
Prior Authorization Retail Medication Request    Medication/Dose: Prior Auth - Amphet-Dextroamphet 3-Bead ER (MYDAYIS) 25 MG CP24  Diagnosis and ICD code (if different than what is on RX):    New/renewal/insurance change PA/secondary ins. PA:  Previously Tried and Failed:    Rationale:      Insurance   Primary: [facility]   Insurance ID: [id number]      Secondary (if applicable):  Insurance ID:      Pharmacy Information (if different than what is on RX)  Name:    Phone:    Fax:

## 2024-08-05 ENCOUNTER — OFFICE VISIT (OUTPATIENT)
Dept: FAMILY MEDICINE | Facility: CLINIC | Age: 29
End: 2024-08-05
Payer: COMMERCIAL

## 2024-08-05 VITALS
SYSTOLIC BLOOD PRESSURE: 122 MMHG | BODY MASS INDEX: 23.81 KG/M2 | HEART RATE: 85 BPM | OXYGEN SATURATION: 96 % | TEMPERATURE: 97.8 F | RESPIRATION RATE: 16 BRPM | DIASTOLIC BLOOD PRESSURE: 78 MMHG | HEIGHT: 71 IN

## 2024-08-05 DIAGNOSIS — Z48.01 ENCOUNTER FOR CHANGE OR REMOVAL OF SURGICAL WOUND DRESSING: Primary | ICD-10-CM

## 2024-08-05 PROCEDURE — 15853 REMOVAL SUTR/STAPL XREQ ANES: CPT | Mod: GC

## 2024-08-05 PROCEDURE — 99213 OFFICE O/P EST LOW 20 MIN: CPT | Mod: 25

## 2024-08-05 NOTE — PATIENT INSTRUCTIONS
Patient Education   Here is the plan from today's visit    1. Encounter for change or removal of surgical wound dressing  Your sutures were removed without complication. Your surgical site is healing well without signs of infection. Please follow up with your surgeon for any concerns with the procedure itself. I have instructions for would care to follow up. Increasing pain, warmth, redness, swelling and fevers could represent skin and soft tissue infection and require prompt follow up in clinic or hospital depending on how severe the infection.   - Suture Removal No Charge      Please call or return to clinic if your symptoms don't go away.    Follow up plan  Return if symptoms worsen or fail to improve, for Follow up.    Thank you for coming to [facility] today.  Lab Testing:  **If you had lab testing today and your results are reassuring or normal they will be mailed to you or sent through [employer] within 7 days.   **If the lab tests need quick action we will call you with the results.  **If you are having labs done on a different day, please call [phone] to schedule at [facility] or [phone] for other [facility] locations. Labs do not offer walk-in appointments.  The phone number we will call with results is # [phone] (home) . If this is not the best number please call our clinic and change the number.  Medication Refills:  If you need any refills please call your pharmacy and they will contact us.   If you need to  your refill at a new pharmacy, please contact the new pharmacy directly. The new pharmacy will help you get your medications transferred faster.   Scheduling:  If you have any concerns about today's visit or wish to schedule another appointment please call our office during normal business hours [phone] (8-5:00 M-F). If you can no longer make a scheduled visit, please cancel via [employer] or call us to cancel.   If a referral was made to an [facility] specialty provider and you do not get a call from central scheduling, please refer to directions on your visit summary or call our office during normal business hours for assistance.   If a Mammogram was ordered for you at the Breast Center call [phone] to schedule or change your appointment.  If you had an XRay/CT/Ultrasound/MRI ordered the number is [phone] to schedule or change your radiology appointment.   [facility] has limited ultrasound appointments available on Wednesdays, if you would like your ultrasound at [facility], please call [phone] to schedule.   Medical Concerns:  If you have urgent medical concerns please call [phone] at any time of the day.    Dakota Goldman MD

## 2024-08-05 NOTE — PROGRESS NOTES
"  Assessment & Plan     Encounter for change or removal of surgical wound dressing  Staple removal visit. This was an uncomplicated procedure with excellent tolerance. Some bleeding notable with staple removal but minimal in extent. Reviewed recommended skin cares which include Vaseline and/or bactrim applied to skin area, as well as return to clinic or hospital guidance for skin and soft tissue infection.   - Suture Removal No Charge      Return if symptoms worsen or fail to improve, for Follow up.    Subjective   Ilan is a 28 year old, presenting for the following health issues:  Follow Up (Staple removal)      8/5/2024     9:25 AM   Additional Questions   Roomed by Leonela   Accompanied by self         8/5/2024    Information    services provided? No        HPI     Comes in for posterior scalp staple removal. This was an out of system procedure. Reports hair implantation from donor graft and staple placement taken 14 days ago.   Reports incision are is healing with concern for infection or other complications.     Review of Systems  Constitutional systems are negative, except as otherwise noted.      Objective    /78   Pulse 85   Temp 97.8  F (36.6  C) (Oral)   Resp 16   Ht 1.803 m (5' 11\")   SpO2 96%   BMI 23.81 kg/m    Body mass index is 23.81 kg/m .  Physical Exam   GENERAL: alert, well appearing and no distress  HENT: NCAT, transverse Incision is clean, dry and intact at posterior scalp at level of and extends nearly to helix of the ear   RESP: non labored breathing  CV: skin is warm and well perfused  MS: no gross musculoskeletal defects noted  SKIN: no suspicious lesions or rashes, faint erythema at incision area without warmth, significant tenderness or swelling  NEURO: Mentation intact and speech normal  PSYCH: Mentation appears normal, affect normal/bright    No results found for this or any previous visit (from the past 24 hour(s)).        Signed Electronically by: Dakota" BONITA Goldman MD          S: Patient is here today for Staple removal.  The patient reports no complications with wound.  Staples were placed 14 days ago.  Staples were placed at posterior scalp hair line reaching from helix to helix of each ear.    o: Wound is well healed, no signs of secondary infection. 51 Staples removed without complication.    A: Laceration    P: Staples removed, F/U PRN.

## 2024-08-06 NOTE — TELEPHONE ENCOUNTER
PA Initiation    Medication: AMPHET-DEXTROAMPHET 3-BEAD ER 25 MG PO CP24  Insurance Company: [employer] - Phone [phone] Fax [phone]  Pharmacy Filling the Rx: [facility] [place], [place] - [address] [place] [address] [phone]  Filling Pharmacy Phone: [phone]  Filling Pharmacy Fax:    Start Date: 8/6/2024    Per review of patient encounters - previously approved PA for this medication was through [employer] [facility] plan. Called [employer] - per rep Sue GROSS (Call ref: [id number]) As of August 1st 2024 patient now has a [employer] plan and this drug is not included on the formulary.     The listed formulary covered meds are for Adderall 25 mg ER (NDC: [address]-78), and Amphetamine-dextroamphet ER 25 mg capsule ([address]-10) neither of which are equivalent to the 3-bead er capsule.     Pursing PA with insurance:

## 2024-08-07 ENCOUNTER — OFFICE VISIT (OUTPATIENT)
Dept: PLASTIC SURGERY | Facility: CLINIC | Age: 29
End: 2024-08-07
Payer: COMMERCIAL

## 2024-08-07 VITALS
BODY MASS INDEX: 23.52 KG/M2 | SYSTOLIC BLOOD PRESSURE: 125 MMHG | OXYGEN SATURATION: 98 % | HEIGHT: 71 IN | DIASTOLIC BLOOD PRESSURE: 83 MMHG | HEART RATE: 104 BPM | WEIGHT: 168 LBS

## 2024-08-07 DIAGNOSIS — F64.9 GENDER DYSPHORIA: Primary | ICD-10-CM

## 2024-08-07 PROCEDURE — 99215 OFFICE O/P EST HI 40 MIN: CPT | Performed by: STUDENT IN AN ORGANIZED HEALTH CARE EDUCATION/TRAINING PROGRAM

## 2024-08-07 ASSESSMENT — PAIN SCALES - GENERAL: PAINLEVEL: NO PAIN (0)

## 2024-08-07 NOTE — LETTER
"8/7/2024       RE: Ilan Bazzi  [address]  [place] [address]     Dear Colleague,    Thank you for referring your patient, Ilan Bazzi, to the [facility] [place] at [facility]. Please see a copy of my visit note below.    PRS    HPI: 28-year-old trans female (she/her pronouns) status post bilateral subglandular feminizing breast augmentation presenting with implant malposition.  Patient has been in her current gender role for 6 years and on estrogen for that same timeframe.  No breast masses, lumps, nipple discharge or swelling in the armpit.  No family history of breast cancer.  [facility] [place] [facility] and has a support system of friends.  Patient has several therapists with whom she meets every other week with one, and a group therapy weekly.  Patient had bilateral feminizing breast augmentation in May 2023.  The implants used were for 50 cc high-profile silicone smooth round gel implants placed in the prepectoral position.  At some point during the recovery, patient indicated that during some exercise activity, she noticed the implants migrate and now she complains of symmastia.     ROS: Negative, see HPI  Past medical history: Gender dysphoria  Past surgical history: Bilateral feminizing breast augmentation 5/2023  Medications: No blood thinners, estrogen  Allergies: Wellbutrin  Social history: Non-smoker, denies any tobacco or nicotine use.  Family history: No bleeding or clotting problems, or issues with anesthesia.  No breast cancer history in the family.    Examination:  /83 (BP Location: Left arm, Patient Position: Chair, Cuff Size: Adult Regular)   Pulse 104   Ht 1.803 m (5' 11\")   Wt 76.2 kg (168 lb)   SpO2 98%   BMI 23.43 kg/m    Nonlabored breathing  Not distressed  Bilateral breasts with pseudoptosis, bottoming out of implants and symmastia  No breast masses, lumps, " nipple discharge or axillary lymphadenopathy bilaterally  Breast measurements:  Sternal notch nipple distance: 20 cm on the right, 21 cm on the left  Nipple midline distance: 12.25 cm on the right, 12 cm on the left  Nipple inframammary crease distance: 10 cm bilaterally  Areolar diameter: 3.5 cm in the right, 3.5 cm on the left  Breast base width: 12.5 cm bilaterally  Bilateral capsules are grade 1    No breast screening imaging    A/P: 28-year-old trans female 14 months status post bilateral feminizing breast augmentation with silicone smooth round gel implants in the prepectoral position complicated by symmastia and pseudoptosis    Discussed options for management.  Explained that this is a difficult problem.  In my opinion, the implants are oversized.  Explained that the prepectoral position in part carries the risk of this type of deformity.  There are several ways we could potentially manage this.  First, my recommendation would be downsizing the implants and placing him in the partial submuscular position.  We may need to support the implants with mesh such as P4HB.  Additionally, it may be that the skin over the sternum has stretched out and the symmastia would need to be repaired with either external bolster stitches or a barbed suture in the subcutaneous space.  Finally, since there is pseudoptosis with expanded nipple inframammary crease distances, the inframammary incisions would have to be extended for an inferior wedge excision.  Patient would like me to proceed with planning and scheduling the revision surgery.  Patient did consent to allowing me to share a de-identified photograph with one of my colleagues in [facility], who specializes in breast aesthetic surgery.    Reiterated risks of implants, including but not limited to: capsular contracture, implant rupture, implant malposition, double bubble deformity, animation deformity, SCOT-ALCL, implant related illness. Reviewed the FDA checklist  for implant related risks.  Patient understands these risks and is appreciative of the discussion.    Reiterated importance of implant surveillance, which includes high-definition US or MR imaging study 5-6 years after the original operation, followed by US or MR imaging every 3 years thereafter.      Discussed the typical activity restrictions following surgery.  For the first 2 weeks, limit lifting to 5-7 pounds and no activity that would elevate the heart rate for sustained periods of time above 100 bpm.  We may initiate upper pole strapping at 2 weeks.  Patient is to wear surgical bra at all times.  At the 6-week visit, we will increase activities usually to near full activity including swimming.  At 3 months postoperatively, patient can perform all activities including push-ups.    Discussed the risks of surgery, including but not limited to: infection, bleeding, pain, poor scarring, hematoma, asymmetry, suboptimal aesthetic result, need for revision surgery, capsular contracture, implant rupture, implant malposition, double bubble deformity, animation deformity, SCOT-ALCL, implant related illness, anesthesia-related complications, DVT/PE, death.  Despite these risks, patient consents and would like to proceed with bilateral feminizing breast augmentation revision with exchange and downsizing of silicone smooth round gel implants, possible use of poly-4-hydroxybutyrate and/or Vicryl mesh, inferior wedge excision of skin and breast tissue, symmastia correction with external bolster stitches.      Case request to be placed  Photography today    A total of 45 minutes was devoted to review of chart, direct face-to-face patient counseling and documentation during this encounter, exclusive of any procedure performed.    Cristobal Reeves MD, PhD        Again, thank you for allowing me to participate in the care of your patient.      Sincerely,    Cristobal Reeves MD

## 2024-08-07 NOTE — NURSING NOTE
"Chief Complaint   Patient presents with    RECHECK     Ilan, is being seen today for a Discuss breast aug revision due to bilateral breast implant malposition. DOS 5/2023 with Dr Harrell       Vitals:    08/07/24 1424   BP: 125/83   BP Location: Left arm   Patient Position: Chair   Cuff Size: Adult Regular   Pulse: 104   SpO2: 98%   Weight: 76.2 kg (168 lb)   Height: 1.803 m (5' 11\")       Body mass index is 23.43 kg/m .      Kerrie Mcclure LPN    "

## 2024-08-07 NOTE — TELEPHONE ENCOUNTER
Prior Authorization Approval    Medication: AMPHET-DEXTROAMPHET 3-BEAD ER 25 MG PO CP24  Authorization Effective Date: 8/7/2024  Authorization Expiration Date: 8/7/2025  Insurance Company: [employer] - Phone [phone] Fax [phone]  Which Pharmacy is filling the prescription: [facility] [place], [place] - [address] [place] [address] [phone]  Pharmacy Notified: YES  Patient Notified: Instructed pharmacy to notify patient once order is ready.

## 2024-08-08 RX ORDER — CEFAZOLIN SODIUM 2 G/50ML
2 SOLUTION INTRAVENOUS SEE ADMIN INSTRUCTIONS
OUTPATIENT
Start: 2024-08-08

## 2024-08-08 RX ORDER — CEFAZOLIN SODIUM 2 G/50ML
2 SOLUTION INTRAVENOUS
OUTPATIENT
Start: 2024-08-08

## 2024-08-08 NOTE — PROGRESS NOTES
"PRS    HPI: 28-year-old trans female (she/her pronouns) status post bilateral subglandular feminizing breast augmentation presenting with implant malposition.  Patient has been in her current gender role for 6 years and on estrogen for that same timeframe.  No breast masses, lumps, nipple discharge or swelling in the armpit.  No family history of breast cancer.  Patient lives in the [facility] and has a support system of friends.  Patient has several therapists with whom she meets every other week with one, and a group therapy weekly.  Patient had bilateral feminizing breast augmentation in May 2023.  The implants used were for 50 cc high-profile silicone smooth round gel implants placed in the prepectoral position.  At some point during the recovery, patient indicated that during some exercise activity, she noticed the implants migrate and now she complains of symmastia.     ROS: Negative, see HPI  Past medical history: Gender dysphoria  Past surgical history: Bilateral feminizing breast augmentation 5/2023  Medications: No blood thinners, estrogen  Allergies: Wellbutrin  Social history: Non-smoker, denies any tobacco or nicotine use.  Family history: No bleeding or clotting problems, or issues with anesthesia.  No breast cancer history in the family.    Examination:  /83 (BP Location: Left arm, Patient Position: Chair, Cuff Size: Adult Regular)   Pulse 104   Ht 1.803 m (5' 11\")   Wt 76.2 kg (168 lb)   SpO2 98%   BMI 23.43 kg/m    Nonlabored breathing  Not distressed  Bilateral breasts with pseudoptosis, bottoming out of implants and symmastia  No breast masses, lumps, nipple discharge or axillary lymphadenopathy bilaterally  Breast measurements:  Sternal notch nipple distance: 20 cm on the right, 21 cm on the left  Nipple midline distance: 12.25 cm on the right, 12 cm on the left  Nipple inframammary crease distance: 10 cm bilaterally  Areolar diameter: 3.5 cm in the right, 3.5 cm on the left  Breast " base width: 12.5 cm bilaterally  Bilateral capsules are grade 1    No breast screening imaging    A/P: 28-year-old trans female 14 months status post bilateral feminizing breast augmentation with silicone smooth round gel implants in the prepectoral position complicated by symmastia and pseudoptosis    Discussed options for management.  Explained that this is a difficult problem.  In my opinion, the implants are oversized.  Explained that the prepectoral position in part carries the risk of this type of deformity.  There are several ways we could potentially manage this.  First, my recommendation would be downsizing the implants and placing him in the partial submuscular position.  We may need to support the implants with mesh such as P4HB.  Additionally, it may be that the skin over the sternum has stretched out and the symmastia would need to be repaired with either external bolster stitches or a barbed suture in the subcutaneous space.  Finally, since there is pseudoptosis with expanded nipple inframammary crease distances, the inframammary incisions would have to be extended for an inferior wedge excision.  Patient would like me to proceed with planning and scheduling the revision surgery.  Patient did consent to allowing me to share a de-identified photograph with one of my colleagues in [facility], who specializes in breast aesthetic surgery.    Reiterated risks of implants, including but not limited to: capsular contracture, implant rupture, implant malposition, double bubble deformity, animation deformity, SCOT-ALCL, implant related illness. Reviewed the FDA checklist for implant related risks.  Patient understands these risks and is appreciative of the discussion.    Reiterated importance of implant surveillance, which includes high-definition US or MR imaging study 5-6 years after the original operation, followed by US or MR imaging every 3 years thereafter.      Discussed the typical activity  restrictions following surgery.  For the first 2 weeks, limit lifting to 5-7 pounds and no activity that would elevate the heart rate for sustained periods of time above 100 bpm.  We may initiate upper pole strapping at 2 weeks.  Patient is to wear surgical bra at all times.  At the 6-week visit, we will increase activities usually to near full activity including swimming.  At 3 months postoperatively, patient can perform all activities including push-ups.    Discussed the risks of surgery, including but not limited to: infection, bleeding, pain, poor scarring, hematoma, asymmetry, suboptimal aesthetic result, need for revision surgery, capsular contracture, implant rupture, implant malposition, double bubble deformity, animation deformity, SCOT-ALCL, implant related illness, anesthesia-related complications, DVT/PE, death.  Despite these risks, patient consents and would like to proceed with bilateral feminizing breast augmentation revision with exchange and downsizing of silicone smooth round gel implants, possible use of poly-4-hydroxybutyrate and/or Vicryl mesh, inferior wedge excision of skin and breast tissue, symmastia correction with external bolster stitches.      Case request to be placed  Photography today    A total of 45 minutes was devoted to review of chart, direct face-to-face patient counseling and documentation during this encounter, exclusive of any procedure performed.    Cristobal Reeves MD, PhD

## 2024-08-12 ENCOUNTER — TELEPHONE (OUTPATIENT)
Dept: PLASTIC SURGERY | Facility: CLINIC | Age: 29
End: 2024-08-12
Payer: COMMERCIAL

## 2024-08-12 NOTE — TELEPHONE ENCOUNTER
This writer called the patient to see if this was a good time to schedule surgery with Dr. Reeves. Patient stated that they are working today and tomorrow (8/13) would be a better day to schedule surgery. Patient requested a call on 8/13 around 2:30pm. Both parties expressed understanding.     Ludy Malone on 8/12/2024 at 2:28 PM

## 2024-08-13 NOTE — TELEPHONE ENCOUNTER
Called patient to schedule surgery with Dr. Reeves    Spoke with: Ilan     Date of Surgery: 2/18/2025    Estimated Arrival time Discussed with Patient:  Yes    Location of surgery: [facility]     Pre-Op H&P: PCP - [facility]    Pre-Op Consult: Yes 1/22/2024 at 12:30pm    Post-Op Appt Date w/ Surgeon: Yes  3/5/2025 at 12:30pm       Discussed with patient pre-op RN will call 2-3 days prior to surgery with arrival time and instructions:  Yes       Standard Surgery Packet Sent: Yes 08/13/24  via [employer] Message      Additional Comments:       All patients questions were answered and was instructed to review surgical packet and call back with any questions or concerns.       Ludy Malone on 8/13/2024 at 3:48 PM

## 2024-08-13 NOTE — TELEPHONE ENCOUNTER
Left voicemail for patient regarding scheduling surgery with Dr. Reeves.    Provided contact number to discuss.    P: [phone]    __    Ludy Malone, on 8/13/2024 at 3:02 PM

## 2024-08-29 DIAGNOSIS — F32.A DEPRESSION, UNSPECIFIED DEPRESSION TYPE: ICD-10-CM

## 2024-08-29 RX ORDER — VENLAFAXINE HYDROCHLORIDE 150 MG/1
CAPSULE, EXTENDED RELEASE ORAL
Qty: 30 CAPSULE | Refills: 11 | Status: SHIPPED | OUTPATIENT
Start: 2024-08-29 | End: 2024-10-01

## 2024-08-29 RX ORDER — VENLAFAXINE HYDROCHLORIDE 150 MG/1
150 TABLET, EXTENDED RELEASE ORAL DAILY
Qty: 90 TABLET | Refills: 3 | Status: CANCELLED | OUTPATIENT
Start: 2024-08-29

## 2024-08-29 NOTE — TELEPHONE ENCOUNTER
"Request for medication refill:  venlafaxine (EFFEXOR-ER) 150 MG 24 hr tablet   Providers if patient needs an appointment and you are willing to give a one month supply please refill for one month and  send a letter/MyChart using \".SMILLIMITEDREFILL\" .smillimited and route chart to \"P [facility] \" (Giving one month refill in non controlled medications is strongly recommended before denial)    If refill has been denied, meaning absolutely no refills without visit, please complete the smart phrase \".smirxrefuse\" and route it to the \"P [facility] MED REFILLS\"  pool to inform the patient and the pharmacy.    Avi Aragon MA     "

## 2024-09-10 ENCOUNTER — MYC MEDICAL ADVICE (OUTPATIENT)
Dept: PLASTIC SURGERY | Facility: CLINIC | Age: 29
End: 2024-09-10
Payer: COMMERCIAL

## 2024-09-10 ENCOUNTER — DOCUMENTATION ONLY (OUTPATIENT)
Dept: PLASTIC SURGERY | Facility: CLINIC | Age: 29
End: 2024-09-10
Payer: COMMERCIAL

## 2024-09-10 NOTE — PROGRESS NOTES
[facility] :  Care Coordination Note     SITUATION   Ilan Bazzi (she/her) is a 29 year old adult who is receiving support for:  Care Team (Los review)  .    BACKGROUND     LOS received for revision surgery and will need updates (media tab dated 8/27/24). Patient will also need to upload DA. Sent message to pt requesting updates to the LOS.    ASSESSMENT     Surgery              CGC Assessment  Comprehensive Gender Care (CGC) Enrollment: Enrolled  Patient has a therapist: Yes  Letter of support #1: Received  Letter #1 Date: 08/26/24  Surgery being considered: Yes  Augmentation: Yes      PLAN          Nursing Interventions:       Follow-up plan:  Pt will upload updated LOS.       KARI Garcia

## 2024-10-01 ENCOUNTER — MYC REFILL (OUTPATIENT)
Dept: FAMILY MEDICINE | Facility: CLINIC | Age: 29
End: 2024-10-01
Payer: COMMERCIAL

## 2024-10-01 DIAGNOSIS — F32.A DEPRESSION, UNSPECIFIED DEPRESSION TYPE: ICD-10-CM

## 2024-10-01 DIAGNOSIS — F64.9 GENDER DYSPHORIA: ICD-10-CM

## 2024-10-01 DIAGNOSIS — F90.9 ATTENTION DEFICIT HYPERACTIVITY DISORDER (ADHD), UNSPECIFIED ADHD TYPE: ICD-10-CM

## 2024-10-01 DIAGNOSIS — F64.0 TRANSSEXUALISM: ICD-10-CM

## 2024-10-01 RX ORDER — DEXTROAMPHETAMINE SACCHARATE, AMPHETAMINE ASPARTATE, DEXTROAMPHETAMINE SULFATE AND AMPHETAMINE SULFATE 2.5; 2.5; 2.5; 2.5 MG/1; MG/1; MG/1; MG/1
TABLET ORAL
Qty: 30 TABLET | Refills: 0 | Status: SHIPPED | OUTPATIENT
Start: 2024-10-01

## 2024-10-01 RX ORDER — ESTRADIOL 2 MG/1
4 TABLET ORAL 2 TIMES DAILY
Qty: 120 TABLET | Refills: 5 | Status: SHIPPED | OUTPATIENT
Start: 2024-10-01

## 2024-10-01 RX ORDER — VENLAFAXINE HYDROCHLORIDE 150 MG/1
150 CAPSULE, EXTENDED RELEASE ORAL DAILY
Qty: 30 CAPSULE | Refills: 11 | Status: SHIPPED | OUTPATIENT
Start: 2024-10-01

## 2024-10-01 RX ORDER — DEXTROAMPHETAMINE SACCHARATE, AMPHETAMINE ASPARTATE MONOHYDRATE, DEXTROAMPHETAMINE SULFATE, AMPHETAMINE SULFATE 6.25; 6.25; 6.25; 6.25 MG/1; MG/1; MG/1; MG/1
25 CAPSULE, EXTENDED RELEASE ORAL DAILY
Qty: 30 CAPSULE | Refills: 0 | Status: SHIPPED | OUTPATIENT
Start: 2024-11-01

## 2024-10-01 RX ORDER — DEXTROAMPHETAMINE SACCHARATE, AMPHETAMINE ASPARTATE MONOHYDRATE, DEXTROAMPHETAMINE SULFATE, AMPHETAMINE SULFATE 6.25; 6.25; 6.25; 6.25 MG/1; MG/1; MG/1; MG/1
25 CAPSULE, EXTENDED RELEASE ORAL DAILY
Qty: 30 CAPSULE | Refills: 0 | Status: SHIPPED | OUTPATIENT
Start: 2024-10-01

## 2024-10-01 RX ORDER — DEXTROAMPHETAMINE SACCHARATE, AMPHETAMINE ASPARTATE, DEXTROAMPHETAMINE SULFATE AND AMPHETAMINE SULFATE 2.5; 2.5; 2.5; 2.5 MG/1; MG/1; MG/1; MG/1
TABLET ORAL
Qty: 30 TABLET | Refills: 0 | Status: SHIPPED | OUTPATIENT
Start: 2024-11-01

## 2024-10-01 RX ORDER — FINASTERIDE 1 MG/1
1 TABLET, FILM COATED ORAL DAILY
Qty: 90 TABLET | Refills: 3 | Status: SHIPPED | OUTPATIENT
Start: 2024-10-01

## 2024-10-01 NOTE — TELEPHONE ENCOUNTER
"Request for medication refill:  Amphet-Dextroamphet 3-Bead ER (MYDAYIS) 25 MG CP24     amphetamine-dextroamphetamine (ADDERALL) 10 MG tablet     estradiol (ESTRACE) 2 MG tablet     finasteride (PROPECIA) 1 MG tablet     venlafaxine (EFFEXOR XR) 150 MG 24 hr capsule     Providers if patient needs an appointment and you are willing to give a one month supply please refill for one month and  send a letter/MyChart using \".SMILLIMITEDREFILL\" .smillimited and route chart to \"P [facility] \" (Giving one month refill in non controlled medications is strongly recommended before denial)    If refill has been denied, meaning absolutely no refills without visit, please complete the smart phrase \".smirxrefuse\" and route it to the \"P [facility] MED REFILLS\"  pool to inform the patient and the pharmacy.    Raghu Cisneros, CMA      "

## 2024-10-18 ENCOUNTER — DOCUMENTATION ONLY (OUTPATIENT)
Dept: PLASTIC SURGERY | Facility: CLINIC | Age: 29
End: 2024-10-18
Payer: COMMERCIAL

## 2024-10-18 NOTE — PROGRESS NOTES
[facility] :  Care Coordination Note     SITUATION   Ilan Bazzi (she/her) is a 29 year old adult who is receiving support for:  Care Team (LOS Review (Update))  .    BACKGROUND     LOS meets criteria for revision surgery (media tab 10/14/24). Writer requested status update of DA.    10/30/24  DA received and meets criteria (media tab 10/29/24).    ASSESSMENT     Surgery              CGC Assessment  Comprehensive Gender Care (CGC) Enrollment: Enrolled  Patient has a therapist: Yes  Letter of support #1: Received  Letter #1 Date: 09/18/24  Surgery being considered: Yes  Augmentation: Yes      PLAN     Interventions:       Follow-up plan:  submit PA when ready.       KARI Garcia

## 2024-10-29 ENCOUNTER — TRANSFERRED RECORDS (OUTPATIENT)
Dept: HEALTH INFORMATION MANAGEMENT | Facility: CLINIC | Age: 29
End: 2024-10-29
Payer: COMMERCIAL

## 2024-10-31 ENCOUNTER — MYC REFILL (OUTPATIENT)
Dept: FAMILY MEDICINE | Facility: CLINIC | Age: 29
End: 2024-10-31
Payer: COMMERCIAL

## 2024-10-31 ENCOUNTER — MYC REFILL (OUTPATIENT)
Dept: SURGERY | Facility: CLINIC | Age: 29
End: 2024-10-31
Payer: COMMERCIAL

## 2024-10-31 DIAGNOSIS — K60.1 CHRONIC ANAL FISSURE: ICD-10-CM

## 2024-10-31 DIAGNOSIS — F32.A DEPRESSION, UNSPECIFIED DEPRESSION TYPE: ICD-10-CM

## 2024-10-31 DIAGNOSIS — K60.2 ANAL FISSURE: ICD-10-CM

## 2024-10-31 DIAGNOSIS — F64.9 GENDER DYSPHORIA: ICD-10-CM

## 2024-10-31 RX ORDER — POLYETHYLENE GLYCOL 3350 17 G/17G
1 POWDER, FOR SOLUTION ORAL DAILY
Qty: 90 PACKET | Refills: 3 | Status: CANCELLED | OUTPATIENT
Start: 2024-10-31

## 2024-10-31 RX ORDER — POLYETHYLENE GLYCOL 3350 17 G/17G
17 POWDER, FOR SOLUTION ORAL DAILY
Qty: 510 G | Refills: 11 | Status: SHIPPED | OUTPATIENT
Start: 2024-10-31

## 2024-11-01 RX ORDER — FINASTERIDE 1 MG/1
1 TABLET, FILM COATED ORAL DAILY
Qty: 90 TABLET | Refills: 3 | Status: SHIPPED | OUTPATIENT
Start: 2024-11-01

## 2024-11-01 RX ORDER — ESTRADIOL 2 MG/1
4 TABLET ORAL 2 TIMES DAILY
Qty: 120 TABLET | Refills: 5 | Status: SHIPPED | OUTPATIENT
Start: 2024-11-01

## 2024-11-01 RX ORDER — VENLAFAXINE HYDROCHLORIDE 150 MG/1
150 CAPSULE, EXTENDED RELEASE ORAL DAILY
Qty: 90 CAPSULE | Refills: 3 | Status: SHIPPED | OUTPATIENT
Start: 2024-11-01

## 2024-11-01 NOTE — TELEPHONE ENCOUNTER
"Request for medication refill:  estradiol (ESTRACE) 2 MG tablet     finasteride (PROPECIA) 1 MG tablet     venlafaxine (EFFEXOR XR) 150 MG 24 hr capsule     Providers if patient needs an appointment and you are willing to give a one month supply please refill for one month and  send a letter/MyChart using \".SMILLIMITEDREFILL\" .smillimited and route chart to \"P [facility] \" (Giving one month refill in non controlled medications is strongly recommended before denial)    If refill has been denied, meaning absolutely no refills without visit, please complete the smart phrase \".smirxrefuse\" and route it to the \"P [facility] MED REFILLS\"  pool to inform the patient and the pharmacy.    Raghu Cisneros, CMA      "

## 2024-11-09 ENCOUNTER — MYC REFILL (OUTPATIENT)
Dept: FAMILY MEDICINE | Facility: CLINIC | Age: 29
End: 2024-11-09
Payer: COMMERCIAL

## 2024-11-09 DIAGNOSIS — F90.9 ATTENTION DEFICIT HYPERACTIVITY DISORDER (ADHD), UNSPECIFIED ADHD TYPE: ICD-10-CM

## 2024-11-11 RX ORDER — DEXTROAMPHETAMINE SACCHARATE, AMPHETAMINE ASPARTATE, DEXTROAMPHETAMINE SULFATE AND AMPHETAMINE SULFATE 2.5; 2.5; 2.5; 2.5 MG/1; MG/1; MG/1; MG/1
TABLET ORAL
Qty: 30 TABLET | Refills: 0 | Status: SHIPPED | OUTPATIENT
Start: 2024-12-11

## 2024-11-11 RX ORDER — DEXTROAMPHETAMINE SACCHARATE, AMPHETAMINE ASPARTATE MONOHYDRATE, DEXTROAMPHETAMINE SULFATE, AMPHETAMINE SULFATE 6.25; 6.25; 6.25; 6.25 MG/1; MG/1; MG/1; MG/1
25 CAPSULE, EXTENDED RELEASE ORAL DAILY
Qty: 30 CAPSULE | Refills: 0 | Status: SHIPPED | OUTPATIENT
Start: 2024-12-11

## 2024-11-11 RX ORDER — DEXTROAMPHETAMINE SACCHARATE, AMPHETAMINE ASPARTATE, DEXTROAMPHETAMINE SULFATE AND AMPHETAMINE SULFATE 2.5; 2.5; 2.5; 2.5 MG/1; MG/1; MG/1; MG/1
TABLET ORAL
Qty: 30 TABLET | Refills: 0 | Status: SHIPPED | OUTPATIENT
Start: 2024-11-11

## 2024-11-11 RX ORDER — DEXTROAMPHETAMINE SACCHARATE, AMPHETAMINE ASPARTATE MONOHYDRATE, DEXTROAMPHETAMINE SULFATE, AMPHETAMINE SULFATE 6.25; 6.25; 6.25; 6.25 MG/1; MG/1; MG/1; MG/1
25 CAPSULE, EXTENDED RELEASE ORAL DAILY
Qty: 30 CAPSULE | Refills: 0 | Status: SHIPPED | OUTPATIENT
Start: 2024-11-11

## 2024-11-11 NOTE — TELEPHONE ENCOUNTER
"Request for medication refill:  Amphet-Dextroamphet 3-Bead ER (MYDAYIS) 25 MG CP24     amphetamine-dextroamphetamine (ADDERALL) 10 MG tablet     Providers if patient needs an appointment and you are willing to give a one month supply please refill for one month and  send a letter/MyChart using \".SMILLIMITEDREFILL\" .smillimited and route chart to \"P [facility] \" (Giving one month refill in non controlled medications is strongly recommended before denial)    If refill has been denied, meaning absolutely no refills without visit, please complete the smart phrase \".smirxrefuse\" and route it to the \"P [facility] MED REFILLS\"  pool to inform the patient and the pharmacy.    Raghu Cisneros, CMA      "

## 2024-11-25 ENCOUNTER — IMMUNIZATION (OUTPATIENT)
Dept: FAMILY MEDICINE | Facility: CLINIC | Age: 29
End: 2024-11-25
Payer: COMMERCIAL

## 2024-11-25 DIAGNOSIS — Z23 ENCOUNTER FOR IMMUNIZATION: Primary | ICD-10-CM

## 2024-11-25 PROCEDURE — 90480 ADMN SARSCOV2 VAC 1/ONLY CMP: CPT

## 2024-11-25 PROCEDURE — 90471 IMMUNIZATION ADMIN: CPT

## 2024-11-25 PROCEDURE — 90656 IIV3 VACC NO PRSV 0.5 ML IM: CPT

## 2024-11-25 PROCEDURE — 91320 SARSCV2 VAC 30MCG TRS-SUC IM: CPT

## 2024-11-25 PROCEDURE — 99207 PR NO CHARGE NURSE ONLY: CPT

## 2024-11-25 NOTE — PROGRESS NOTES
Prior to immunization administration, verified patients identity using patient s name and date of birth. Please see Immunization Activity for additional information.     Is the patient's temperature normal (100.5 or less)? Yes     Patient MEETS CRITERIA. PROCEED with vaccine administration.          11/25/2024   COVID   Have you had myocarditis or pericarditis (inflammation of or around the heart muscle) after getting a COVID-19 vaccine? No   Have you had a serious reaction to a COVID vaccine or something in a COVID vaccine, like polyethylene glycol (PEG) or polysorbate? No   Have you had multisystem inflammatory syndrome from COVID-19 in the past 90 days? No   Have you received a bone marrow transplant within the previous 3 months? No            Patient MEETS CRITERIA. PROCEED with vaccine administration.            11/25/2024   Hepatitis B   Have you had a serious reaction to a hepatitis B vaccine or to something in a hepatitis B vaccine, including yeast)? No   Are you getting kidney dialysis (either peritoneal or hemodialysis)? No   Do you have an allergy to latex? No            Patient MEETS CRITERIA. PROCEED with vaccine administration.            11/25/2024   INFLUENZA   Would you like to receive the flu shot or the nasal flu vaccine today? Flu Shot   Have you had a serious reaction to a flu vaccine or something in a flu vaccine? No   Have you had Guillain-[place] syndrome within 6 weeks of getting a vaccine? No   Have you received a bone marrow transplant within the previous 6 months? No            Patient MEETS CRITERIA. PROCEED with vaccine administration.        Patient instructed to remain in clinic for 15 minutes afterwards, and to report any adverse reactions.      Link to Ancillary Visit Immunization Standing Orders SmartSet     Screening performed by Marylu Clarke MA on 11/25/2024 at 1:57 PM.

## 2024-11-30 ENCOUNTER — HEALTH MAINTENANCE LETTER (OUTPATIENT)
Age: 29
End: 2024-11-30

## 2024-12-26 ENCOUNTER — OFFICE VISIT (OUTPATIENT)
Dept: FAMILY MEDICINE | Facility: CLINIC | Age: 29
End: 2024-12-26
Payer: COMMERCIAL

## 2024-12-26 VITALS
WEIGHT: 167.5 LBS | DIASTOLIC BLOOD PRESSURE: 77 MMHG | BODY MASS INDEX: 23.45 KG/M2 | SYSTOLIC BLOOD PRESSURE: 124 MMHG | RESPIRATION RATE: 16 BRPM | HEART RATE: 114 BPM | OXYGEN SATURATION: 98 % | TEMPERATURE: 98 F | HEIGHT: 71 IN

## 2024-12-26 DIAGNOSIS — M54.9 UPPER BACK PAIN: ICD-10-CM

## 2024-12-26 DIAGNOSIS — J02.9 SORE THROAT: ICD-10-CM

## 2024-12-26 DIAGNOSIS — R22.0 POSTAURICULAR SWELLING: ICD-10-CM

## 2024-12-26 DIAGNOSIS — F32.A DEPRESSION, UNSPECIFIED DEPRESSION TYPE: ICD-10-CM

## 2024-12-26 DIAGNOSIS — F64.9 GENDER DYSPHORIA: Primary | ICD-10-CM

## 2024-12-26 DIAGNOSIS — R53.83 FATIGUE, UNSPECIFIED TYPE: ICD-10-CM

## 2024-12-26 LAB
FERRITIN SERPL-MCNC: 80 NG/ML (ref 6–409)
HGB BLD-MCNC: 15.4 G/DL (ref 11.7–17.7)
IRON BINDING CAPACITY (ROCHE): 314 UG/DL (ref 240–430)
IRON SATN MFR SERPL: 23 % (ref 15–46)
IRON SERPL-MCNC: 73 UG/DL (ref 37–157)
TRANSFERRIN SERPL-MCNC: 255 MG/DL (ref 200–360)
VIT B12 SERPL-MCNC: 1347 PG/ML (ref 232–1245)
VIT D+METAB SERPL-MCNC: 52 NG/ML (ref 20–50)

## 2024-12-26 PROCEDURE — 82728 ASSAY OF FERRITIN: CPT | Performed by: STUDENT IN AN ORGANIZED HEALTH CARE EDUCATION/TRAINING PROGRAM

## 2024-12-26 PROCEDURE — 36415 COLL VENOUS BLD VENIPUNCTURE: CPT | Performed by: STUDENT IN AN ORGANIZED HEALTH CARE EDUCATION/TRAINING PROGRAM

## 2024-12-26 PROCEDURE — 85018 HEMOGLOBIN: CPT | Performed by: STUDENT IN AN ORGANIZED HEALTH CARE EDUCATION/TRAINING PROGRAM

## 2024-12-26 PROCEDURE — 83540 ASSAY OF IRON: CPT | Performed by: STUDENT IN AN ORGANIZED HEALTH CARE EDUCATION/TRAINING PROGRAM

## 2024-12-26 PROCEDURE — 84466 ASSAY OF TRANSFERRIN: CPT | Performed by: STUDENT IN AN ORGANIZED HEALTH CARE EDUCATION/TRAINING PROGRAM

## 2024-12-26 PROCEDURE — 82607 VITAMIN B-12: CPT | Performed by: STUDENT IN AN ORGANIZED HEALTH CARE EDUCATION/TRAINING PROGRAM

## 2024-12-26 PROCEDURE — 82306 VITAMIN D 25 HYDROXY: CPT | Performed by: STUDENT IN AN ORGANIZED HEALTH CARE EDUCATION/TRAINING PROGRAM

## 2024-12-26 PROCEDURE — 99214 OFFICE O/P EST MOD 30 MIN: CPT | Performed by: STUDENT IN AN ORGANIZED HEALTH CARE EDUCATION/TRAINING PROGRAM

## 2024-12-26 RX ORDER — VENLAFAXINE HYDROCHLORIDE 75 MG/1
75 CAPSULE, EXTENDED RELEASE ORAL DAILY
Qty: 60 CAPSULE | Refills: 0 | Status: SHIPPED | OUTPATIENT
Start: 2024-12-26

## 2024-12-26 RX ORDER — VENLAFAXINE HYDROCHLORIDE 37.5 MG/1
37.5 CAPSULE, EXTENDED RELEASE ORAL DAILY
Qty: 60 CAPSULE | Refills: 0 | Status: SHIPPED | OUTPATIENT
Start: 2024-12-26

## 2024-12-26 ASSESSMENT — PAIN SCALES - GENERAL: PAINLEVEL_OUTOF10: NO PAIN (0)

## 2024-12-26 ASSESSMENT — PATIENT HEALTH QUESTIONNAIRE - PHQ9
SUM OF ALL RESPONSES TO PHQ QUESTIONS 1-9: 8
SUM OF ALL RESPONSES TO PHQ QUESTIONS 1-9: 8
10. IF YOU CHECKED OFF ANY PROBLEMS, HOW DIFFICULT HAVE THESE PROBLEMS MADE IT FOR YOU TO DO YOUR WORK, TAKE CARE OF THINGS AT HOME, OR GET ALONG WITH OTHER PEOPLE: NOT DIFFICULT AT ALL

## 2024-12-26 NOTE — PROGRESS NOTES
"  {PROVIDER CHARTING PREFERENCE:597834}    Subjective   Ilan is a 29 year old, presenting for the following health issues:  RECHECK (Follow-up Questions)  {(!) Visit Details have not yet been documented.  Please enter Visit Details and then use this list to pull in documentation. (Optional):295593}  HPI     Just so I don't forget     My lymph nodes have been swollen/my throat sore for two weeks, I'm not sure why I don't feel super sick beyond just swollen throat. Maybe my bedroom is too dry? 30% humidity is low.  -was in [place] 12/5-8 for a convention   Was sick for 3d, then fine for 4d, then sick again for 4d. Not sure if same sickness or new sickness  Since then has felt swollen in throat. Not painful. Wakes up in the morning, swallowing feels like it's swollen. Yes post nasal drip      Behind my right ear is a weird mildly painful bump     Talk about weight stuff     Check blood for vitamin D, iron, E levels/T levels     Weening off venlefaxine. Mood wise feeling like things are okay.      Look at Fitbit sleep data (not much REM sleep?)  - does feel rested in the morning usually   - wonders about role of REM w/ memory      Surgery in Feb, pre surgery screening schedule    ++++++++++++  Tension in neck and shoulders. Didn't end up being able to go to PT.   Is back at the gym 3x/week, has been helping   Wondering if there's anything specific recommend doing or avoiding     {SUPERLIST (Optional):033924}  {additonal problems for provider to add (Optional):315036}    {ROS Picklists (Optional):582353}      Objective    /77 (BP Location: Left arm, Patient Position: Sitting, Cuff Size: Adult Regular)   Pulse 114   Temp 98  F (36.7  C) (Temporal)   Resp 16   Ht 1.803 m (5' 11\")   Wt 76 kg (167 lb 8 oz)   SpO2 98%   BMI 23.36 kg/m    Body mass index is 23.36 kg/m .  Physical Exam   {Exam List (Optional):268550}    {Diagnostic Test Results (Optional):167732}        Signed Electronically by: Sofy Hudson, " Spiritual Plan of Care    Pt Name: Gokul Vaca  Pt : 2022  Date: 2022    Visit Type:  In person    Referral Source:  Interdisciplinary team    Reason for Visit: Emergency Code    Visited With: Patient not available, Parent/Guardian    Patient Affect at Time of Visit: Unresponsive  Patient Assessment: Unable to assess    Family/Friend Name: Parents  Family/Friend Affect at Time of Visit: Expressive, Open to  visit, Tearful, Talkative  Family/Friend Assessment: Anxious, Coping, Fear, Relies on sandra  Family/Friend  Intervention: Affirmation,  Support, Empathic Listening, Prayer, Reassurance    Responded to request to provide support to parents post code. Parents were tearful, but supportive of each other and welcomed  support.    Created space for them to share their thoughts and normalized their feelings. Parents expressed confidence in surgical team and welcomed \"as much support as we can get.\"     Family expressed appreciation for support and were attempting to rest once Gokul was taken to OR.    Passed to [facility] for f/u.    The Rev. FRANCA Infante, [facility]    Staff        DO  {Email feedback regarding this note to [email or web address]   :487753}  Answers submitted by the patient for this visit:  Patient Health Questionnaire (Submitted on 12/26/2024)  If you checked off any problems, how difficult have these problems made it for you to do your work, take care of things at home, or get along with other people?: Not difficult at all  PHQ9 TOTAL SCORE: 8

## 2025-01-07 NOTE — PATIENT INSTRUCTIONS
Venlafaxine taper:   112.5mg daily x2 weeks (75mg plus 37.5mg pill)  75mg daily x2 weeks  37.5mg daily x2 weeks   Off     If you feel bad w/ a dose decrease, can do every-other-day dosing for a few days to make the taper more gradual    Message me if problems!

## 2025-01-13 ENCOUNTER — MYC REFILL (OUTPATIENT)
Dept: FAMILY MEDICINE | Facility: CLINIC | Age: 30
End: 2025-01-13
Payer: COMMERCIAL

## 2025-01-13 DIAGNOSIS — F90.9 ATTENTION DEFICIT HYPERACTIVITY DISORDER (ADHD), UNSPECIFIED ADHD TYPE: ICD-10-CM

## 2025-01-13 NOTE — TELEPHONE ENCOUNTER
"Request for medication refill:  Amphet-Dextroamphet 3-Bead ER (MYDAYIS) 25 MG CP24   amphetamine-dextroamphetamine (ADDERALL) 10 MG tablet     Providers if patient needs an appointment and you are willing to give a one month supply please refill for one month and  send a letter/MyChart using \".SMILLIMITEDREFILL\" .smillimited and route chart to \"P [facility] \" (Giving one month refill in non controlled medications is strongly recommended before denial)    If refill has been denied, meaning absolutely no refills without visit, please complete the smart phrase \".smirxrefuse\" and route it to the \"P [facility] MED REFILLS\"  pool to inform the patient and the pharmacy.    Janis Calderón RN      "

## 2025-01-14 RX ORDER — DEXTROAMPHETAMINE SACCHARATE, AMPHETAMINE ASPARTATE, DEXTROAMPHETAMINE SULFATE AND AMPHETAMINE SULFATE 2.5; 2.5; 2.5; 2.5 MG/1; MG/1; MG/1; MG/1
TABLET ORAL
Qty: 30 TABLET | Refills: 0 | Status: SHIPPED | OUTPATIENT
Start: 2025-01-14

## 2025-01-14 RX ORDER — DEXTROAMPHETAMINE SACCHARATE, AMPHETAMINE ASPARTATE MONOHYDRATE, DEXTROAMPHETAMINE SULFATE, AMPHETAMINE SULFATE 6.25; 6.25; 6.25; 6.25 MG/1; MG/1; MG/1; MG/1
25 CAPSULE, EXTENDED RELEASE ORAL DAILY
Qty: 30 CAPSULE | Refills: 0 | Status: SHIPPED | OUTPATIENT
Start: 2025-01-14

## 2025-01-14 RX ORDER — DEXTROAMPHETAMINE SACCHARATE, AMPHETAMINE ASPARTATE MONOHYDRATE, DEXTROAMPHETAMINE SULFATE, AMPHETAMINE SULFATE 6.25; 6.25; 6.25; 6.25 MG/1; MG/1; MG/1; MG/1
25 CAPSULE, EXTENDED RELEASE ORAL DAILY
Qty: 30 CAPSULE | Refills: 0 | Status: SHIPPED | OUTPATIENT
Start: 2025-02-14

## 2025-01-14 RX ORDER — DEXTROAMPHETAMINE SACCHARATE, AMPHETAMINE ASPARTATE, DEXTROAMPHETAMINE SULFATE AND AMPHETAMINE SULFATE 2.5; 2.5; 2.5; 2.5 MG/1; MG/1; MG/1; MG/1
TABLET ORAL
Qty: 30 TABLET | Refills: 0 | Status: SHIPPED | OUTPATIENT
Start: 2025-02-14

## 2025-01-22 ENCOUNTER — OFFICE VISIT (OUTPATIENT)
Dept: PLASTIC SURGERY | Facility: CLINIC | Age: 30
End: 2025-01-22
Payer: COMMERCIAL

## 2025-01-22 VITALS
BODY MASS INDEX: 23.52 KG/M2 | DIASTOLIC BLOOD PRESSURE: 73 MMHG | OXYGEN SATURATION: 97 % | WEIGHT: 168 LBS | SYSTOLIC BLOOD PRESSURE: 121 MMHG | HEIGHT: 71 IN | HEART RATE: 105 BPM

## 2025-01-22 DIAGNOSIS — Z98.82 S/P AUGMENTATION MAMMAPLASTY: Primary | ICD-10-CM

## 2025-01-22 ASSESSMENT — PAIN SCALES - GENERAL: PAINLEVEL_OUTOF10: NO PAIN (0)

## 2025-01-22 NOTE — LETTER
1/22/2025       RE: Ilan Bazzi  [address]  [place] [address]     Dear Colleague,    Thank you for referring your patient, Ilan Bazzi, to the [facility] [place] [facility]. Please see a copy of my visit note below.    PRS    Patient presents for preoperative visit.  We discussed the plan for surgery.  After reviewing photos, reiterated the plan for surgical correction of the deformities.  First, we will plan to remove the implants and change the pocket location to partial subpectoral, while maintaining the medial attachments.  We will then place a smaller implant sizer and plan to tailor tacked the inferior pole skin and an elliptical or wedge shaped fashion.  Second, we will check the implant position with the sizers with the patient upright intraoperatively.  If the size is appropriate, then we would plan to exchange the implants for permanent silicone smooth round gel implants of the appropriate size, and then perform a capsular and ultimately skin closure.  If there is residual symmastia following change of pocket location and downsizing, then we would plan to repair using barbed interrupted or running subcutaneous suturing from the underside of the midline down to the sternal periosteum.  Patient is agreeable with the plan.    Reiterated risks of implants, including but not limited to: capsular contracture, implant rupture, implant malposition, double bubble deformity, animation deformity, SCOT-ALCL, implant related illness. Reviewed the FDA checklist for implant related risks.  Patient understands these risks and is appreciative of the discussion.    Reiterated importance of implant surveillance, which includes high-definition US or MR imaging study 5-6 years after the original operation, followed by US or MR imaging every 3 years thereafter.      Discussed the typical activity restrictions  following surgery.  For the first 2 weeks, limit lifting to 5-7 pounds and no activity that would elevate the heart rate for sustained periods of time above 100 bpm.  We may initiate upper pole strapping at 2 weeks.  Patient is to wear surgical bra at all times.  At the 6-week visit, we will increase activities usually to near full activity including swimming.  At 3 months postoperatively, patient can perform all activities including push-ups.    Discussed the risks of surgery, including but not limited to: infection, bleeding, pain, poor scarring, hematoma, asymmetry, suboptimal aesthetic result, need for revision surgery, capsular contracture, implant rupture, implant malposition, double bubble deformity, animation deformity, SCOT-ALCL, implant related illness, anesthesia-related complications, DVT/PE, death.  Despite these risks, patient consents and would like to proceed with bilateral feminizing breast augmentation revision.  All questions answered.     A total of 20 minutes was devoted to review of chart, direct face-to-face patient counseling and documentation during and on the day of this encounter, exclusive of any procedure performed.    Cristobal Reeves MD, PhD, FACS        Again, thank you for allowing me to participate in the care of your patient.      Sincerely,    Cristobal Reeves MD

## 2025-01-22 NOTE — NURSING NOTE
"Chief Complaint   Patient presents with    RECHECK     Pre-op, DOS 2/18/2025. Bilateral feminizing breast augmentation       Vitals:    01/22/25 1209   BP: 121/73   BP Location: Left arm   Patient Position: Sitting   Cuff Size: Adult Regular   Pulse: 105   SpO2: 97%   Weight: 168 lb   Height: 5' 11\"       Body mass index is 23.43 kg/m .                          Tamia Palmer, EMT    "

## 2025-01-22 NOTE — PROGRESS NOTES
PRS    Patient presents for preoperative visit.  We discussed the plan for surgery.  After reviewing photos, reiterated the plan for surgical correction of the deformities.  First, we will plan to remove the implants and change the pocket location to partial subpectoral, while maintaining the medial attachments.  We will then place a smaller implant sizer and plan to tailor tacked the inferior pole skin and an elliptical or wedge shaped fashion.  Second, we will check the implant position with the sizers with the patient upright intraoperatively.  If the size is appropriate, then we would plan to exchange the implants for permanent silicone smooth round gel implants of the appropriate size, and then perform a capsular and ultimately skin closure.  If there is residual symmastia following change of pocket location and downsizing, then we would plan to repair using barbed interrupted or running subcutaneous suturing from the underside of the midline down to the sternal periosteum.  Patient is agreeable with the plan.    Reiterated risks of implants, including but not limited to: capsular contracture, implant rupture, implant malposition, double bubble deformity, animation deformity, SCOT-ALCL, implant related illness. Reviewed the FDA checklist for implant related risks.  Patient understands these risks and is appreciative of the discussion.    Reiterated importance of implant surveillance, which includes high-definition US or MR imaging study 5-6 years after the original operation, followed by US or MR imaging every 3 years thereafter.      Discussed the typical activity restrictions following surgery.  For the first 2 weeks, limit lifting to 5-7 pounds and no activity that would elevate the heart rate for sustained periods of time above 100 bpm.  We may initiate upper pole strapping at 2 weeks.  Patient is to wear surgical bra at all times.  At the 6-week visit, we will increase activities usually to near full  activity including swimming.  At 3 months postoperatively, patient can perform all activities including push-ups.    Discussed the risks of surgery, including but not limited to: infection, bleeding, pain, poor scarring, hematoma, asymmetry, suboptimal aesthetic result, need for revision surgery, capsular contracture, implant rupture, implant malposition, double bubble deformity, animation deformity, SCOT-ALCL, implant related illness, anesthesia-related complications, DVT/PE, death.  Despite these risks, patient consents and would like to proceed with bilateral feminizing breast augmentation revision.  All questions answered.     A total of 20 minutes was devoted to review of chart, direct face-to-face patient counseling and documentation during and on the day of this encounter, exclusive of any procedure performed.    Cristobal Reeves MD, PhD, FACS

## 2025-01-23 NOTE — NURSING NOTE
Pre Op Teaching Flowsheet                                        Pre and Post op Patient Education  Diagnosis: Gender dysphoria  Teaching Pre and post op for Bilateral breast augmentation  Person Involved in teaching: Patient    Patient demonstrates understanding of the following:  Date of surgery: 2/18/25  Location of surgery: [facility]  Pre operative History/Physical other testing prior to surgery: PCP on 02/07/25  No more than 30 days prior to surgery date.   Medications to take the day of surgery: PCP   Blood thinner medications discussed and when to stop (if applicable): PCP   Diabetes medication management (if applicable): PCP    Patient demonstrates understanding of the following:  Patient informed and verbalizes understanding of the need for a responsible adult  and someone to stay with them for the first 24 hours post-operatively: Yes  NPO per Anesthesia Guidelines : Reviewed that pt will receive a call from pre-op nurse prior to surgery reviewing time of arrival and when to stop eating and drinking  Pre-op showering/scrub information with Hibiclens Soap: Yes, 2 bottles given to pt at clinic visit today with instruction on how to use it.    Discussed   Pain management after surgery: pain scale, alternating Ibuprofen/ Tylenol every 6 hours, taking muscle relaxer prior to narcotics   Infection prevention  Surgical procedure site care taught  Signs and symptoms of infection taught  Wound care will be taught at the time of discharge.    Post-op follow-up: 3/4/25  Discussed how to contact the hospital, nurse, and clinic staff if necessary.     Surgical instructions given to patient in clinic.  Instructional materials: Pre and post op breast augmentation surgery packet.    Total time with patient: 15 minutes  Bin BELLO RN

## 2025-02-03 ENCOUNTER — MYC REFILL (OUTPATIENT)
Dept: FAMILY MEDICINE | Facility: CLINIC | Age: 30
End: 2025-02-03
Payer: COMMERCIAL

## 2025-02-03 DIAGNOSIS — F90.9 ATTENTION DEFICIT HYPERACTIVITY DISORDER (ADHD), UNSPECIFIED ADHD TYPE: ICD-10-CM

## 2025-02-03 DIAGNOSIS — F64.9 GENDER DYSPHORIA: ICD-10-CM

## 2025-02-03 RX ORDER — DEXTROAMPHETAMINE SACCHARATE, AMPHETAMINE ASPARTATE, DEXTROAMPHETAMINE SULFATE AND AMPHETAMINE SULFATE 2.5; 2.5; 2.5; 2.5 MG/1; MG/1; MG/1; MG/1
TABLET ORAL
Qty: 30 TABLET | Refills: 0 | Status: CANCELLED | OUTPATIENT
Start: 2025-02-03

## 2025-02-03 RX ORDER — DEXTROAMPHETAMINE SACCHARATE, AMPHETAMINE ASPARTATE MONOHYDRATE, DEXTROAMPHETAMINE SULFATE, AMPHETAMINE SULFATE 6.25; 6.25; 6.25; 6.25 MG/1; MG/1; MG/1; MG/1
25 CAPSULE, EXTENDED RELEASE ORAL DAILY
Qty: 30 CAPSULE | Refills: 0 | Status: CANCELLED | OUTPATIENT
Start: 2025-02-03

## 2025-02-03 RX ORDER — ESTRADIOL 2 MG/1
4 TABLET ORAL 2 TIMES DAILY
Qty: 120 TABLET | Refills: 5 | Status: CANCELLED | OUTPATIENT
Start: 2025-02-03

## 2025-02-03 NOTE — TELEPHONE ENCOUNTER
Patient has refills on estradiol, scripts are due to drop on both MYDAIS and Adderall on 2/14/25.    Janis Calderón RN

## 2025-02-07 ENCOUNTER — OFFICE VISIT (OUTPATIENT)
Dept: FAMILY MEDICINE | Facility: CLINIC | Age: 30
End: 2025-02-07
Payer: COMMERCIAL

## 2025-02-07 VITALS
TEMPERATURE: 98.2 F | BODY MASS INDEX: 23.43 KG/M2 | RESPIRATION RATE: 16 BRPM | HEART RATE: 87 BPM | OXYGEN SATURATION: 97 % | DIASTOLIC BLOOD PRESSURE: 70 MMHG | HEIGHT: 71 IN | SYSTOLIC BLOOD PRESSURE: 133 MMHG

## 2025-02-07 DIAGNOSIS — F64.9 GENDER DYSPHORIA: ICD-10-CM

## 2025-02-07 DIAGNOSIS — Z01.818 PREOP GENERAL PHYSICAL EXAM: Primary | ICD-10-CM

## 2025-02-07 DIAGNOSIS — F33.41 RECURRENT MAJOR DEPRESSIVE DISORDER, IN PARTIAL REMISSION: ICD-10-CM

## 2025-02-07 DIAGNOSIS — F90.2 ATTENTION DEFICIT HYPERACTIVITY DISORDER (ADHD), COMBINED TYPE: ICD-10-CM

## 2025-02-07 ASSESSMENT — PATIENT HEALTH QUESTIONNAIRE - PHQ9: SUM OF ALL RESPONSES TO PHQ QUESTIONS 1-9: 3

## 2025-02-07 NOTE — PROGRESS NOTES
Preoperative Evaluation  [address] [address]  Phone: [phone]  Fax: [phone]  Primary Provider: Sofy Hudson DO  Pre-op Performing Provider: Sofy Hudson DO  Feb 7, 2025 2/7/2025   Surgical Information   What procedure is being done? top surgery   Facility or Hospital where procedure/surgery will be performed: [facility] [address] [place] [address]   Who is doing the procedure / surgery? pedro   Date of surgery / procedure: feb 18 , 2025   Time of surgery / procedure: 1 pm   Where do you plan to recover after surgery? at home with family        Assessment & Plan     The proposed surgical procedure is considered LOW risk.    Preop general physical exam  Chronic conditions are medically optimized. Pt is a redhead and has family members w/ anesthesia problems. Ilan herself has had good success w/ all anesthesia for surgeries. Will discuss with Anesthesia in pre-op.     Gender dysphoria  Top surgery revision as above. Otherwise doing well. Discussed some concerns around body fat distribution -- HRT labs this summer and can assess from there.     Recurrent major depressive disorder, in partial remission  Tapering off of venlafaxine - mild symptoms but tolerable. Mood stable overall. Gave # to follow up with Psych referral to discuss TMS.     Attention deficit hyperactivity disorder (ADHD), combined type  Stable on current stimulant regimen.         - No identified additional risk factors other than previously addressed    Preoperative Medication Instructions  Antiplatelet or Anticoagulation Medication Instructions   - Patient is on no antiplatelet or anticoagulation medications.    Additional Medication Instructions  Take all scheduled medications on the day of surgery EXCEPT for modifications listed below: Hold all stimulants the day of surgery     Recommendation  Approval given to proceed with proposed procedure,  without further diagnostic evaluation.    Subjective   Ilan is a 29 year old, presenting for the following:  Pre-Op Exam          2/7/2025     1:36 PM   Additional Questions   Roomed by Iftin         2/7/2025    Information    services provided? No     HPI related to upcoming procedure:   Feeling well recently, no concerns         2/7/2025   Pre-Op Questionnaire   Have you ever had a heart attack or stroke? No   Have you ever had surgery on your heart or blood vessels, such as a stent placement, a coronary artery bypass, or surgery on an artery in your head, neck, heart, or legs? No   Do you have chest pain with activity? No   Do you have a history of heart failure? No   Do you currently have a cold, bronchitis or symptoms of other infection? No   Do you have a cough, shortness of breath, or wheezing? No   Do you or anyone in your family have previous history of blood clots? No   Do you or does anyone in your family have a serious bleeding problem such as prolonged bleeding following surgeries or cuts? No   Have you ever had problems with anemia or been told to take iron pills? No   Have you had any abnormal blood loss such as black, tarry or bloody stools, or abnormal vaginal bleeding? No   Have you ever had a blood transfusion? No   Are you willing to have a blood transfusion if it is medically needed before, during, or after your surgery? Yes   Have you or any of your relatives ever had problems with anesthesia? (!) YES - some relatives have had bad reactions. Woke up mid surgery.    Do you have sleep apnea, excessive snoring or daytime drowsiness? No   Do you have any artifical heart valves or other implanted medical devices like a pacemaker, defibrillator, or continuous glucose monitor? No   Do you have artificial joints? No   Are you allergic to latex? No     Red head. Hasn't had any issues w/ it. First major surgery - parents were super anxious about it bc family has had history of bad  reactions.        Preoperative Review of    reviewed - controlled substances reflected in medication list.      Status of Chronic Conditions:  See problem list for active medical problems.  Problems all longstanding and stable, except as noted/documented.  See ROS for pertinent symptoms related to these conditions.    Patient Active Problem List    Diagnosis Date Noted    Gender dysphoria 04/20/2023     Priority: Medium     Feminizing HRT since April 2019      Recurrent major depressive disorder, in partial remission 05/16/2022     Priority: Medium    Generalized anxiety disorder 05/16/2022     Priority: Medium    Restless legs syndrome (RLS) 08/12/2021     Priority: Medium     Low ferritin August 2021, improved w/ supplementation       Attention deficit hyperactivity disorder (ADHD), combined type 04/27/2021     Priority: Medium    Depression 04/27/2021     Priority: Medium    PTSD (post-traumatic stress disorder) 04/27/2021     Priority: Medium    Panic disorder with agoraphobia 09/01/2009     Priority: Medium      Past Medical History:   Diagnosis Date    Anxiety     Concussion without loss of consciousness 2001    Left varicocele 7/20/2021     Past Surgical History:   Procedure Laterality Date    ENT SURGERY      wisdom teeth extraction    LE FORT ONE N/A 6/8/2017    Procedure: LE FORT ONE;  LEF FORT ONE OSTEOTOMY;  Surgeon: Murray Tran DDS;  Location: [facility] OR    MAMMOPLASTY AUGMENTATION Bilateral 5/9/2023    Procedure: AUGMENTATION, BREAST BILATERAL;  Surgeon: Gareth Harrell MD;  Location: [facility] OR    RECONSTRUCT FOREHEAD N/A 3/9/2021    Procedure: 1. Anterior frontal sinus osteotomy and setback,  2. Bony recontouring of forehead, fronto-orbital bar,  3. Bony recontouring of naso-frontal junction,  4. Bony recontouring and ostectomy of orbits, bilateral,  5. Bony recontouring of bilateral anterior temporal lines,  7. Elevation of bi-pedicled pericranial flap, with release and decompression of bilateral  supra-orbital neurovascular b    RECONSTRUCT MANDIBLE N/A 3/9/2021    Procedure: 8. Ostectomy and contouring of mandible and chin,  9. Thyroid reduction chondroplasty,;  Surgeon: Shayla Cardenas MD;  Location: [facility] OR    SUSPEND BROW Bilateral 3/9/2021    Procedure: 6. Open repair of bilateral brow ptosis with forehead Endotine,;  Surgeon: Shayla Cardenas MD;  Location: [facility] OR     Current Outpatient Medications   Medication Sig Dispense Refill    Amphet-Dextroamphet 3-Bead ER (MYDAYIS) 25 MG CP24 Take 25 mg by mouth daily. 30 capsule 0    [START ON 2/14/2025] Amphet-Dextroamphet 3-Bead ER (MYDAYIS) 25 MG CP24 Take 25 mg by mouth daily. 30 capsule 0    Amphet-Dextroamphet 3-Bead ER (MYDAYIS) 25 MG CP24 Take 25 mg by mouth daily. 30 capsule 0    Amphet-Dextroamphet 3-Bead ER (MYDAYIS) 25 MG CP24 Take 25 mg by mouth daily. 30 capsule 0    Amphet-Dextroamphet 3-Bead ER (MYDAYIS) 25 MG CP24 Take 25 mg by mouth daily 30 capsule 0    amphetamine-dextroamphetamine (ADDERALL) 10 MG tablet TAKE ONE Tablet (10mg) BY MOUTH EVERY DAY AT 2PM 30 tablet 0    [START ON 2/14/2025] amphetamine-dextroamphetamine (ADDERALL) 10 MG tablet TAKE ONE Tablet (10mg) BY MOUTH EVERY DAY AT 2PM 30 tablet 0    amphetamine-dextroamphetamine (ADDERALL) 10 MG tablet TAKE ONE Tablet (10mg) BY MOUTH EVERY DAY AT 2PM 30 tablet 0    amphetamine-dextroamphetamine (ADDERALL) 10 MG tablet TAKE ONE Tablet (10mg) BY MOUTH EVERY DAY AT 2PM 30 tablet 0    amphetamine-dextroamphetamine (ADDERALL) 10 MG tablet TAKE ONE Tablet (10mg) BY MOUTH EVERY DAY AT 2PM 30 tablet 0    estradiol (ESTRACE) 2 MG tablet Take 2 tablets (4 mg) by mouth 2 times daily. 120 tablet 5    Ferrous Gluconate 240 (27 Fe) MG TABS Take by mouth daily      finasteride (PROPECIA) 1 MG tablet Take 1 tablet (1 mg) by mouth daily. 90 tablet 3    Multiple Vitamins-Minerals (MULTIVITAL PO) Take by mouth every morning       polyethylene glycol (MIRALAX) 17 GM/Dose powder Take 17 g by mouth daily. 510 g  "11    venlafaxine (EFFEXOR XR) 37.5 MG 24 hr capsule Take 1 capsule (37.5 mg) by mouth daily. Take with 75mg pill for total dose 112.5mg daily. 60 capsule 0    diltiazem 2% in PLO gel To anal opening three times daily.  Use a pea-sized amount.  Store at room temperature. (Patient not taking: Reported on 2/7/2025) 60 g 0       Allergies   Allergen Reactions    Wellbutrin [Bupropion] Swelling     Pruritus and edema to feet        Social History     Tobacco Use    Smoking status: Never     Passive exposure: Never    Smokeless tobacco: Never   Substance Use Topics    Alcohol use: Not Currently     Family History   Problem Relation Age of Onset    Depression Mother     Macular Degeneration Father     Other - See Comments Father 66        \"total autonomic failure\" diagnosed at [place]    Alzheimer Disease Maternal Grandmother     Myocardial Infarction Maternal Grandfather     Coronary Artery Disease Paternal Grandmother     Myocardial Infarction Paternal Grandmother 70    Glaucoma No family hx of      History   Drug Use Unknown             Review of Systems  Constitutional, HEENT, cardiovascular, pulmonary, gi and gu systems are negative, except as otherwise noted.    Objective    /70 (BP Location: Right arm, Patient Position: Sitting, Cuff Size: Adult Regular)   Pulse 87   Temp 98.2  F (36.8  C) (Oral)   Resp 16   Ht 1.803 m (5' 11\")   SpO2 97%   BMI 23.43 kg/m     Estimated body mass index is 23.43 kg/m  as calculated from the following:    Height as of this encounter: 1.803 m (5' 11\").    Weight as of 1/22/25: 76.2 kg (168 lb).  Physical Exam  GENERAL: alert and no distress  HENT: ear canals and TM's normal, nose and mouth without ulcers or lesions  NECK: no adenopathy, no asymmetry, masses, or scars  RESP: lungs clear to auscultation - no rales, rhonchi or wheezes  CV: regular rate and rhythm, normal S1 S2, no S3 or S4, no murmur, click or rub, no peripheral edema  MS: no gross musculoskeletal defects noted, " no edema  PSYCH: mentation appears normal, affect normal/bright    Recent Labs   Lab Test 12/26/24  1635 05/28/24  0846   HGB 15.4 15.8   NA  --  139   POTASSIUM  --  4.2   CR  --  1.04        Diagnostics  No labs were ordered during this visit.   No EKG required for low risk surgery (cataract, skin procedure, breast biopsy, etc).    Revised Cardiac Risk Index (RCRI)  The patient has the following serious cardiovascular risks for perioperative complications:   - No serious cardiac risks = 0 points     RCRI Interpretation: 0 points: Class I (very low risk - 0.4% complication rate)         Signed Electronically by: Sofy Hudson DO  A copy of this evaluation report is provided to the requesting physician.

## 2025-02-07 NOTE — PATIENT INSTRUCTIONS
How to Take Your Medication Before Surgery  Preoperative Medication Instructions   Antiplatelet or Anticoagulation Medication Instructions   - Patient is on no antiplatelet or anticoagulation medications.    Additional Medication Instructions  Take all scheduled medications on the day of surgery EXCEPT for modifications listed below: Hold all stimulants the day of surgery        Patient Education   Preparing for Your Surgery  For Adults  Getting started  In most cases, a nurse will call to review your health history and instructions. They will give you an arrival time based on your scheduled surgery time. Please be ready to share:  Your doctor's clinic name and phone number  Your medical, surgical, and anesthesia history  A list of allergies and sensitivities  A list of medicines, including herbal treatments and over-the-counter drugs  Whether the patient has a legal guardian (ask how to send us the papers in advance)  Note: You may not receive a call if you were seen at our PAC (Preoperative Assessment Center).  Please tell us if you're pregnant--or if there's any chance you might be pregnant. Some surgeries may injure a fetus (unborn baby), so they require a pregnancy test. Surgeries that are safe for a fetus don't always need a test, and you can choose whether to have one.   Preparing for surgery  Within 10 to 30 days of surgery: Have a pre-op exam (sometimes called an H&P, or History and Physical). This can be done at a clinic or pre-operative center.  If you're having a , you may not need this exam. Talk to your care team.  At your pre-op exam, talk to your care team about all medicines you take. (This includes CBD oil and any drugs, such as THC, marijuana, and other forms of cannabis.) If you need to stop any medicine before surgery, ask when to start taking it again.  This is for your safety. Many medicines and drugs can make you bleed too much during surgery. Some change how well surgery  (anesthesia) drugs work.  Call your insurance company to let them know you're having surgery. (If you don't have insurance, call [phone].)  Call your clinic if there's any change in your health. This includes a scrape or scratch near the surgery site, or any signs of a cold (sore throat, runny nose, cough, rash, fever).  Eating and drinking guidelines  For your safety: Unless your surgeon tells you otherwise, follow the guidelines below.  Eat and drink as normal until 8 hours before you arrive for surgery. After that, no food or milk. You can spit out gum when you arrive.  Drink clear liquids until 2 hours before you arrive. These are liquids you can see through, like water, Gatorade, and Propel Water. They also include plain black coffee and tea (no cream or milk).  No alcohol for 24 hours before you arrive. The night before surgery, stop any drinks that contain THC.  If your care team tells you to take medicine on the morning of surgery, it's okay to take it with a sip of water. No other medicines or drugs are allowed (including CBD oil)--follow your care team's instructions.  If you have questions the day of surgery, call your hospital or surgery center.   Preventing infection  Shower or bathe the night before and the morning of surgery. Follow the instructions your clinic gave you. (If no instructions, use regular soap.)  Don't shave or clip hair near your surgery site. We'll remove the hair if needed.  Don't smoke or vape the morning of surgery. No chewing tobacco for 6 hours before you arrive. A nicotine patch is okay. You may spit out nicotine gum when you arrive.  For some surgeries, the surgeon will tell you to fully quit smoking and nicotine.  We will make every effort to keep you safe from infection. We will:  Clean our hands often with soap and water (or an alcohol-based hand rub).  Clean the skin at your surgery site with a special soap that kills germs.  Give you a special gown to keep you warm.  (Cold raises the risk of infection.)  Wear hair covers, masks, gowns, and gloves during surgery.  Give antibiotic medicine, if prescribed. Not all surgeries need this medicine.  What to bring on the day of surgery  Photo ID and insurance card  Copy of your health care directive, if you have one  Glasses and hearing aids (bring cases)  You can't wear contacts during surgery  Inhaler and eye drops, if you use them (tell us about these when you arrive)  CPAP machine or breathing device, if you use them  A few personal items, if spending the night  If you have . . .  A pacemaker, ICD (cardiac defibrillator), or other implant: Bring the ID card.  An implanted stimulator: Bring the remote control.  A legal guardian: Bring a copy of the certified (court-stamped) guardianship papers.  Please remove any jewelry, including body piercings. Leave jewelry and other valuables at home.  If you're going home the day of surgery  You must have a responsible adult drive you home. They should stay with you overnight as well.  If you don't have someone to stay with you, and you aren't safe to go home alone, we may keep you overnight. Insurance often won't pay for this.  After surgery  If it's hard to control your pain or you need more pain medicine, please call your surgeon's office.  Questions?   If you have any questions for your care team, list them here:   ____________________________________________________________________________________________________________________________________________________________________________________________________________________________________________________________  For informational purposes only. Not to replace the advice of your health care provider. Copyright   2003, 2019 [facility]. All rights reserved. Clinically reviewed by Gavin Campoverde MD. [employer] [id number] - REV 08/24.

## 2025-02-17 ENCOUNTER — ANESTHESIA EVENT (OUTPATIENT)
Dept: SURGERY | Facility: AMBULATORY SURGERY CENTER | Age: 30
End: 2025-02-17
Payer: COMMERCIAL

## 2025-02-18 ENCOUNTER — HOSPITAL ENCOUNTER (OUTPATIENT)
Facility: AMBULATORY SURGERY CENTER | Age: 30
Discharge: HOME | End: 2025-02-18
Attending: STUDENT IN AN ORGANIZED HEALTH CARE EDUCATION/TRAINING PROGRAM
Payer: COMMERCIAL

## 2025-02-18 ENCOUNTER — ANESTHESIA (OUTPATIENT)
Dept: SURGERY | Facility: AMBULATORY SURGERY CENTER | Age: 30
End: 2025-02-18
Payer: COMMERCIAL

## 2025-02-18 VITALS
TEMPERATURE: 97.5 F | OXYGEN SATURATION: 98 % | WEIGHT: 163 LBS | SYSTOLIC BLOOD PRESSURE: 124 MMHG | RESPIRATION RATE: 16 BRPM | DIASTOLIC BLOOD PRESSURE: 77 MMHG | HEART RATE: 83 BPM | BODY MASS INDEX: 22.82 KG/M2 | HEIGHT: 71 IN

## 2025-02-18 DIAGNOSIS — F64.9 GENDER DYSPHORIA: Primary | ICD-10-CM

## 2025-02-18 PROCEDURE — 88300 SURGICAL PATH GROSS: CPT | Mod: 26 | Performed by: PATHOLOGY

## 2025-02-18 PROCEDURE — 88300 SURGICAL PATH GROSS: CPT | Mod: TC | Performed by: STUDENT IN AN ORGANIZED HEALTH CARE EDUCATION/TRAINING PROGRAM

## 2025-02-18 DEVICE — NATRELLE INSPIRA SSF 365CC FULL PROFILE SMOOTH ROUND SILICONE
Type: IMPLANTABLE DEVICE | Site: BREAST | Status: FUNCTIONAL
Brand: NATRELLE INSPIRA SOFTTOUCH BREAST IMPLANTS

## 2025-02-18 RX ORDER — DEXAMETHASONE SODIUM PHOSPHATE 10 MG/ML
4 INJECTION, SOLUTION INTRAMUSCULAR; INTRAVENOUS
Status: DISCONTINUED | OUTPATIENT
Start: 2025-02-18 | End: 2025-02-19 | Stop reason: HOSPADM

## 2025-02-18 RX ORDER — SODIUM CHLORIDE, SODIUM LACTATE, POTASSIUM CHLORIDE, CALCIUM CHLORIDE 600; 310; 30; 20 MG/100ML; MG/100ML; MG/100ML; MG/100ML
INJECTION, SOLUTION INTRAVENOUS CONTINUOUS
Status: DISCONTINUED | OUTPATIENT
Start: 2025-02-18 | End: 2025-02-19 | Stop reason: HOSPADM

## 2025-02-18 RX ORDER — FENTANYL CITRATE 50 UG/ML
INJECTION, SOLUTION INTRAMUSCULAR; INTRAVENOUS PRN
Status: DISCONTINUED | OUTPATIENT
Start: 2025-02-18 | End: 2025-02-18

## 2025-02-18 RX ORDER — ACETAMINOPHEN 325 MG/1
975 TABLET ORAL ONCE
Status: COMPLETED | OUTPATIENT
Start: 2025-02-18 | End: 2025-02-18

## 2025-02-18 RX ORDER — KETAMINE HYDROCHLORIDE 10 MG/ML
INJECTION INTRAMUSCULAR; INTRAVENOUS PRN
Status: DISCONTINUED | OUTPATIENT
Start: 2025-02-18 | End: 2025-02-18

## 2025-02-18 RX ORDER — ONDANSETRON 4 MG/1
4 TABLET, FILM COATED ORAL EVERY 6 HOURS PRN
Qty: 12 TABLET | Refills: 0 | Status: SHIPPED | OUTPATIENT
Start: 2025-02-18

## 2025-02-18 RX ORDER — HYDROMORPHONE HYDROCHLORIDE 1 MG/ML
0.2 INJECTION, SOLUTION INTRAMUSCULAR; INTRAVENOUS; SUBCUTANEOUS EVERY 5 MIN PRN
Status: DISCONTINUED | OUTPATIENT
Start: 2025-02-18 | End: 2025-02-19 | Stop reason: HOSPADM

## 2025-02-18 RX ORDER — METHOCARBAMOL 500 MG/1
500 TABLET, FILM COATED ORAL 4 TIMES DAILY
Qty: 12 TABLET | Refills: 0 | Status: SHIPPED | OUTPATIENT
Start: 2025-02-18

## 2025-02-18 RX ORDER — ONDANSETRON 4 MG/1
4 TABLET, ORALLY DISINTEGRATING ORAL EVERY 30 MIN PRN
Status: DISCONTINUED | OUTPATIENT
Start: 2025-02-18 | End: 2025-02-19 | Stop reason: HOSPADM

## 2025-02-18 RX ORDER — CEFAZOLIN SODIUM 2 G/50ML
2 SOLUTION INTRAVENOUS
Status: COMPLETED | OUTPATIENT
Start: 2025-02-18 | End: 2025-02-18

## 2025-02-18 RX ORDER — PROPOFOL 10 MG/ML
INJECTION, EMULSION INTRAVENOUS CONTINUOUS PRN
Status: DISCONTINUED | OUTPATIENT
Start: 2025-02-18 | End: 2025-02-18

## 2025-02-18 RX ORDER — DEXMEDETOMIDINE HYDROCHLORIDE 4 UG/ML
INJECTION, SOLUTION INTRAVENOUS PRN
Status: DISCONTINUED | OUTPATIENT
Start: 2025-02-18 | End: 2025-02-18

## 2025-02-18 RX ORDER — SODIUM CHLORIDE, SODIUM LACTATE, POTASSIUM CHLORIDE, CALCIUM CHLORIDE 600; 310; 30; 20 MG/100ML; MG/100ML; MG/100ML; MG/100ML
INJECTION, SOLUTION INTRAVENOUS CONTINUOUS PRN
Status: DISCONTINUED | OUTPATIENT
Start: 2025-02-18 | End: 2025-02-18

## 2025-02-18 RX ORDER — OXYCODONE HYDROCHLORIDE 5 MG/1
10 TABLET ORAL
Status: DISCONTINUED | OUTPATIENT
Start: 2025-02-18 | End: 2025-02-19 | Stop reason: HOSPADM

## 2025-02-18 RX ORDER — FENTANYL CITRATE 50 UG/ML
25 INJECTION, SOLUTION INTRAMUSCULAR; INTRAVENOUS EVERY 5 MIN PRN
Status: DISCONTINUED | OUTPATIENT
Start: 2025-02-18 | End: 2025-02-19 | Stop reason: HOSPADM

## 2025-02-18 RX ORDER — ONDANSETRON 2 MG/ML
INJECTION INTRAMUSCULAR; INTRAVENOUS PRN
Status: DISCONTINUED | OUTPATIENT
Start: 2025-02-18 | End: 2025-02-18

## 2025-02-18 RX ORDER — ONDANSETRON 2 MG/ML
4 INJECTION INTRAMUSCULAR; INTRAVENOUS EVERY 30 MIN PRN
Status: DISCONTINUED | OUTPATIENT
Start: 2025-02-18 | End: 2025-02-19 | Stop reason: HOSPADM

## 2025-02-18 RX ORDER — HYDRALAZINE HYDROCHLORIDE 20 MG/ML
2.5-5 INJECTION INTRAMUSCULAR; INTRAVENOUS EVERY 10 MIN PRN
Status: DISCONTINUED | OUTPATIENT
Start: 2025-02-18 | End: 2025-02-19 | Stop reason: HOSPADM

## 2025-02-18 RX ORDER — NALOXONE HYDROCHLORIDE 0.4 MG/ML
0.1 INJECTION, SOLUTION INTRAMUSCULAR; INTRAVENOUS; SUBCUTANEOUS
Status: DISCONTINUED | OUTPATIENT
Start: 2025-02-18 | End: 2025-02-19 | Stop reason: HOSPADM

## 2025-02-18 RX ORDER — LIDOCAINE HYDROCHLORIDE 20 MG/ML
INJECTION, SOLUTION INFILTRATION; PERINEURAL PRN
Status: DISCONTINUED | OUTPATIENT
Start: 2025-02-18 | End: 2025-02-18

## 2025-02-18 RX ORDER — LIDOCAINE 40 MG/G
CREAM TOPICAL
Status: DISCONTINUED | OUTPATIENT
Start: 2025-02-18 | End: 2025-02-19 | Stop reason: HOSPADM

## 2025-02-18 RX ORDER — FENTANYL CITRATE 50 UG/ML
50 INJECTION, SOLUTION INTRAMUSCULAR; INTRAVENOUS EVERY 5 MIN PRN
Status: DISCONTINUED | OUTPATIENT
Start: 2025-02-18 | End: 2025-02-19 | Stop reason: HOSPADM

## 2025-02-18 RX ORDER — GLYCOPYRROLATE 0.2 MG/ML
INJECTION, SOLUTION INTRAMUSCULAR; INTRAVENOUS PRN
Status: DISCONTINUED | OUTPATIENT
Start: 2025-02-18 | End: 2025-02-18

## 2025-02-18 RX ORDER — DEXAMETHASONE SODIUM PHOSPHATE 4 MG/ML
INJECTION, SOLUTION INTRA-ARTICULAR; INTRALESIONAL; INTRAMUSCULAR; INTRAVENOUS; SOFT TISSUE PRN
Status: DISCONTINUED | OUTPATIENT
Start: 2025-02-18 | End: 2025-02-18

## 2025-02-18 RX ORDER — HYDROMORPHONE HYDROCHLORIDE 1 MG/ML
0.4 INJECTION, SOLUTION INTRAMUSCULAR; INTRAVENOUS; SUBCUTANEOUS EVERY 5 MIN PRN
Status: DISCONTINUED | OUTPATIENT
Start: 2025-02-18 | End: 2025-02-19 | Stop reason: HOSPADM

## 2025-02-18 RX ORDER — OXYCODONE HYDROCHLORIDE 5 MG/1
5 TABLET ORAL
Status: COMPLETED | OUTPATIENT
Start: 2025-02-18 | End: 2025-02-18

## 2025-02-18 RX ORDER — CEFAZOLIN SODIUM 2 G/50ML
2 SOLUTION INTRAVENOUS SEE ADMIN INSTRUCTIONS
Status: DISCONTINUED | OUTPATIENT
Start: 2025-02-18 | End: 2025-02-19 | Stop reason: HOSPADM

## 2025-02-18 RX ORDER — PROPOFOL 10 MG/ML
INJECTION, EMULSION INTRAVENOUS PRN
Status: DISCONTINUED | OUTPATIENT
Start: 2025-02-18 | End: 2025-02-18

## 2025-02-18 RX ORDER — CEPHALEXIN 500 MG/1
500 CAPSULE ORAL 4 TIMES DAILY
Qty: 12 CAPSULE | Refills: 0 | Status: SHIPPED | OUTPATIENT
Start: 2025-02-18

## 2025-02-18 RX ORDER — LABETALOL HYDROCHLORIDE 5 MG/ML
10 INJECTION, SOLUTION INTRAVENOUS
Status: DISCONTINUED | OUTPATIENT
Start: 2025-02-18 | End: 2025-02-19 | Stop reason: HOSPADM

## 2025-02-18 RX ORDER — OXYCODONE HYDROCHLORIDE 5 MG/1
5 TABLET ORAL EVERY 6 HOURS PRN
Qty: 12 TABLET | Refills: 0 | Status: SHIPPED | OUTPATIENT
Start: 2025-02-18 | End: 2025-02-21

## 2025-02-18 RX ADMIN — ACETAMINOPHEN 975 MG: 325 TABLET ORAL at 11:48

## 2025-02-18 RX ADMIN — DEXMEDETOMIDINE HYDROCHLORIDE 12 MCG: 4 INJECTION, SOLUTION INTRAVENOUS at 14:13

## 2025-02-18 RX ADMIN — LIDOCAINE HYDROCHLORIDE 80 MG: 20 INJECTION, SOLUTION INFILTRATION; PERINEURAL at 13:12

## 2025-02-18 RX ADMIN — PROPOFOL 150 MG: 10 INJECTION, EMULSION INTRAVENOUS at 13:12

## 2025-02-18 RX ADMIN — SODIUM CHLORIDE, SODIUM LACTATE, POTASSIUM CHLORIDE, CALCIUM CHLORIDE: 600; 310; 30; 20 INJECTION, SOLUTION INTRAVENOUS at 13:08

## 2025-02-18 RX ADMIN — KETAMINE HYDROCHLORIDE 20 MG: 10 INJECTION INTRAMUSCULAR; INTRAVENOUS at 13:18

## 2025-02-18 RX ADMIN — DEXMEDETOMIDINE HYDROCHLORIDE 8 MCG: 4 INJECTION, SOLUTION INTRAVENOUS at 13:50

## 2025-02-18 RX ADMIN — CEFAZOLIN SODIUM 2 G: 2 SOLUTION INTRAVENOUS at 13:06

## 2025-02-18 RX ADMIN — DEXAMETHASONE SODIUM PHOSPHATE 4 MG: 4 INJECTION, SOLUTION INTRA-ARTICULAR; INTRALESIONAL; INTRAMUSCULAR; INTRAVENOUS; SOFT TISSUE at 13:12

## 2025-02-18 RX ADMIN — PROPOFOL 200 MCG/KG/MIN: 10 INJECTION, EMULSION INTRAVENOUS at 13:12

## 2025-02-18 RX ADMIN — HYDROMORPHONE HYDROCHLORIDE 0.4 MG: 1 INJECTION, SOLUTION INTRAMUSCULAR; INTRAVENOUS; SUBCUTANEOUS at 15:31

## 2025-02-18 RX ADMIN — Medication 100 MCG: at 13:38

## 2025-02-18 RX ADMIN — OXYCODONE HYDROCHLORIDE 5 MG: 5 TABLET ORAL at 15:36

## 2025-02-18 RX ADMIN — GLYCOPYRROLATE 0.2 MG: 0.2 INJECTION, SOLUTION INTRAMUSCULAR; INTRAVENOUS at 13:18

## 2025-02-18 RX ADMIN — Medication 50 MG: at 13:12

## 2025-02-18 RX ADMIN — HYDROMORPHONE HYDROCHLORIDE 0.1 MG: 1 INJECTION, SOLUTION INTRAMUSCULAR; INTRAVENOUS; SUBCUTANEOUS at 15:41

## 2025-02-18 RX ADMIN — Medication 20 MG: at 13:45

## 2025-02-18 RX ADMIN — PROPOFOL 50 MG: 10 INJECTION, EMULSION INTRAVENOUS at 14:38

## 2025-02-18 RX ADMIN — ONDANSETRON 4 MG: 2 INJECTION INTRAMUSCULAR; INTRAVENOUS at 13:49

## 2025-02-18 RX ADMIN — DEXMEDETOMIDINE HYDROCHLORIDE 12 MCG: 4 INJECTION, SOLUTION INTRAVENOUS at 14:38

## 2025-02-18 RX ADMIN — FENTANYL CITRATE 100 MCG: 50 INJECTION, SOLUTION INTRAMUSCULAR; INTRAVENOUS at 13:10

## 2025-02-18 NOTE — ANESTHESIA PREPROCEDURE EVALUATION
Anesthesia Pre-Procedure Evaluation    Patient: Ilan Bazzi   MRN: [id number] : 1995        Procedure : Procedure(s):  Bilateral feminizing breast augmentation revision with exchange and downsizing of silicone smooth round gel implants, possible use of poly-4-hydroxybutyrate and/or Vicryl mesh, inferior wedge excision of skin and breast tissue, symmastia correction with external bolster stitches          Past Medical History:   Diagnosis Date     Anxiety      Concussion without loss of consciousness      Left varicocele 2021      Past Surgical History:   Procedure Laterality Date     ENT SURGERY      wisdom teeth extraction     LE FORT ONE N/A 2017    Procedure: LE FORT ONE;  LEF FORT ONE OSTEOTOMY;  Surgeon: Murray Tran DDS;  Location: [facility] OR     MAMMOPLASTY AUGMENTATION Bilateral 2023    Procedure: AUGMENTATION, BREAST BILATERAL;  Surgeon: Gareth Harrell MD;  Location: [facility] OR     RECONSTRUCT FOREHEAD N/A 3/9/2021    Procedure: 1. Anterior frontal sinus osteotomy and setback,  2. Bony recontouring of forehead, fronto-orbital bar,  3. Bony recontouring of naso-frontal junction,  4. Bony recontouring and ostectomy of orbits, bilateral,  5. Bony recontouring of bilateral anterior temporal lines,  7. Elevation of bi-pedicled pericranial flap, with release and decompression of bilateral supra-orbital neurovascular b     RECONSTRUCT MANDIBLE N/A 3/9/2021    Procedure: 8. Ostectomy and contouring of mandible and chin,  9. Thyroid reduction chondroplasty,;  Surgeon: Shayla Cardenas MD;  Location: [facility] OR     SUSPEND BROW Bilateral 3/9/2021    Procedure: 6. Open repair of bilateral brow ptosis with forehead Endotine,;  Surgeon: Shayla Cardenas MD;  Location: [facility] OR      Allergies   Allergen Reactions     Wellbutrin [Bupropion] Swelling     Pruritus and edema to feet      Social History     Tobacco Use     Smoking status: Never     Passive exposure: Never     Smokeless tobacco: Never   Substance  "Use Topics     Alcohol use: Not Currently      Wt Readings from Last 1 Encounters:   02/18/25 73.9 kg (163 lb)        Anesthesia Evaluation   Pt has had prior anesthetic. Type: General.    No history of anesthetic complications       ROS/MED HX  ENT/Pulmonary:  - neg pulmonary ROS     Neurologic: Comment: ADHD      Cardiovascular:  - neg cardiovascular ROS     METS/Exercise Tolerance: >4 METS    Hematologic:  - neg hematologic  ROS     Musculoskeletal:  - neg musculoskeletal ROS     GI/Hepatic:  - neg GI/hepatic ROS     Renal/Genitourinary:  - neg Renal ROS     Endo:  - neg endo ROS     Psychiatric/Substance Use:     (+) psychiatric history anxiety       Infectious Disease:  - neg infectious disease ROS     Malignancy:  - neg malignancy ROS     Other:          Physical Exam    Airway        Mallampati: I   TM distance: > 3 FB   Neck ROM: full   Mouth opening: > 3 cm    Respiratory Devices and Support         Dental       (+) Completely normal teeth      Cardiovascular   cardiovascular exam normal       Rhythm and rate: regular and normal     Pulmonary   pulmonary exam normal        breath sounds clear to auscultation       OUTSIDE LABS:  CBC:   Lab Results   Component Value Date    HGB 15.4 12/26/2024    HGB 15.8 05/28/2024     BMP:   Lab Results   Component Value Date     05/28/2024     07/26/2023    POTASSIUM 4.2 05/28/2024    POTASSIUM 4.5 07/26/2023    CHLORIDE 103 05/28/2024    CHLORIDE 103 07/26/2023    CO2 27 05/28/2024    CO2 26 07/26/2023    BUN 12.1 05/28/2024    BUN 16.9 07/26/2023    CR 1.04 05/28/2024    CR 0.97 07/26/2023    GLC 63 (L) 05/28/2024    GLC 78 07/26/2023     COAGS: No results found for: \"PTT\", \"INR\", \"FIBR\"  POC:   Lab Results   Component Value Date    BGM 93 03/09/2021     HEPATIC:   Lab Results   Component Value Date    ALBUMIN 4.8 07/26/2023    PROTTOTAL 7.6 07/26/2023    ALT 14 07/26/2023    AST 30 07/26/2023    ALKPHOS 39 07/26/2023    BILITOTAL 0.2 07/26/2023     OTHER: "   Lab Results   Component Value Date    RYLEY 9.6 05/28/2024    TSH 1.98 07/26/2023       Anesthesia Plan    ASA Status:  1    NPO Status:  NPO Appropriate    Anesthesia Type: General.     - Airway: LMA   Induction: Intravenous, Propofol.   Maintenance: TIVA.        Consents    Anesthesia Plan(s) and associated risks, benefits, and realistic alternatives discussed. Questions answered and patient/representative(s) expressed understanding.     - Discussed:     - Discussed with:  Patient            Postoperative Care    Pain management: Multi-modal analgesia.   PONV prophylaxis: Ondansetron (or other 5HT-3), Background Propofol Infusion, Dexamethasone or Solumedrol     Comments:             Michelle Christianson MD    I have reviewed the pertinent notes and labs in the chart from the past 30 days and (re)examined the patient.  Any updates or changes from those notes are reflected in this note.    Clinically Significant Risk Factors Present on Admission

## 2025-02-18 NOTE — OP NOTE
PRS    No changes in history or examination.  Medically optimized.    OR today for bilateral feminizing breast augmentation revision with exchange and downsizing of silicone smooth round gel implants, inferior wedge excision of skin and breast tissue, symmastia correction.    Discussed the risks of surgery, including but not limited to: infection, bleeding, pain, poor scarring, hematoma, asymmetry, suboptimal aesthetic result, need for revision surgery, capsular contracture, implant rupture, implant malposition, double bubble deformity, animation deformity, SCOT-ALCL, implant related illness, anesthesia-related complications, DVT/PE, death.  Despite these risks, patient consents and would like to proceed with bilateral feminizing breast augmentation revision.  All questions answered.     Cristobal Reeves MD, PhD, FACS

## 2025-02-18 NOTE — ANESTHESIA CARE TRANSFER NOTE
Patient: Ilan Bazzi    Procedure: Procedure(s):  Bilateral feminizing breast augmentation revision with exchange and downsizing of silicone smooth round gel implants, inferior wedge excision of skin and breast tissue, symmastia correction       Diagnosis: Gender dysphoria [F64.9]  Diagnosis Additional Information: No value filed.    Anesthesia Type:   General     Note:    Oropharynx: oropharynx clear of all foreign objects and spontaneously breathing  Level of Consciousness: drowsy  Oxygen Supplementation: face mask  Level of Supplemental Oxygen (L/min / FiO2): 6  Independent Airway: airway patency satisfactory and stable  Dentition: dentition unchanged  Vital Signs Stable: post-procedure vital signs reviewed and stable  Report to RN Given: handoff report given  Patient transferred to: PACU  Comments: Resps easy and reg. Report to PACU RN   Handoff Report: Identifed the Patient, Identified the Reponsible Provider, Reviewed the pertinent medical history, Discussed the surgical course, Reviewed Intra-OP anesthesia mangement and issues during anesthesia, Set expectations for post-procedure period and Allowed opportunity for questions and acknowledgement of understanding      Vitals:  Vitals Value Taken Time   BP     Temp 97.1    Pulse 79 02/18/25 1506   Resp 29 02/18/25 1506   SpO2 100 % 02/18/25 1506   Vitals shown include unfiled device data.    Electronically Signed By: SCOTT Goldstein CRNA  February 18, 2025  3:06 PM

## 2025-02-18 NOTE — OP NOTE
PLASTIC SURGERY OPERATIVE REPORT     Date of Surgery: 2/18/2025  Surgical Service: Plastic Surgery     Preoperative Diagnosis: Chest gender dysphoria     Postoperative Diagnosis: Same as preoperative diagnosis     Procedures Performed:   Bilateral revision feminizing breast augmentation with use of permanent silicone gel implants in the partial subpectoral pocket  Symmastia correction     Attending:  SUE Reeves MD PhD FACS  Assistant: TSERING Moore MD     Complications: None apparent  Specimens: None  Implants: Allergan 365 SSF silicone gel implants bilaterally  Estimated blood loss: 10 mL  Wound classification: Clean  Anesthesia: General     Indications for Procedure: This is a 29-year-old transfemale presenting with chest gender dysphoria with a history of bilateral feminizing breast augmentation in the subfascial plane complicated by symmastia and implant malposition.  After discussing options, with persistent gender dysphoria, patient elects undergo revision surgery.    Discussed the risks of surgery, including but not limited to: infection, bleeding, pain, poor scarring, hematoma, asymmetry, suboptimal aesthetic result, need for revision surgery, capsular contracture, implant rupture, implant malposition, double bubble deformity, animation deformity, SCOT-ALCL, implant related illness, anesthesia-related complications, DVT/PE, death.  Despite these risks, patient consents and would like to proceed with bilateral feminizing breast augmentation revision.  All questions answered.      Intraoperative findings: Bilateral partial submuscular pockets were created with no bleeding, leaving a cuff of inferior pectoralis major muscle caudally.  Very good symmetry following trial of 365 full profile implants.  The symmastia was corrected by cauterizing the subfascial capsule and gently tacking the skin at the preoperative markings down with interrupted 2-0 Monocryl suture.  Very good symmetry after placement of implants  with no reproducible symmastia.     Description of Procedure: Patient was seen in the preoperative holding area.  Consent was verified.  The breasts were marked.  All additional questions were answered.  The risks of the surgery were reiterated, including (but not limited to): infection, bleeding, pain, poor scarring, need for aesthetic revision, implant malposition, implant rupture, animation deformity, capsular contracture, implant extrusion, hematoma, seroma. Following discussion of all these risks, the patient agrees to proceed with surgery.  Patient was then transferred to the operating room and placed supine on the operating table.  All pressure points were padded.  Sequential compression devices were placed on bilateral lower extremities and verified to be operational.  IV antibiotic prophylaxis was given.  Preinduction timeout was performed.  General anesthesia was induced.  At the start of the case, the chest and both breasts were prepped and draped in usual sterile fashion.  The nipples were covered with sterile Tegaderm.  Of note, the same procedure was done on both breasts. Next, a 7 cm inframammary incision was done over the prior inframammary scars and extending both medially and laterally.  Next, once through skin, monopolar electrocautery was used to get down onto the thin grade 1 capsules, which were incised transversely.  The implants were removed.  The pectoralis major muscle and its lateral border were identified and the subpectoral pocket was developed using monopolar cautery.   The pectoralis major inferior attachments were divided all the way to the medial origins.  Next, hemostasis was ensured, although there was little to no bleeding.  Next, 365 cc full profile implant sizers were placed bilaterally.  Provisional SFS Vicryl suture as well as skin stapling was done.  Patient was sat upright for evaluation.  It was determined that the implant position was appropriate and symmetric, and the  size of the augmentation was body appropriate.  Also, with medial directed pressure on the implants, no symmastia was reproduced and the symmastia had been corrected.  Patient was then laid back down.  The sizers were removed.  Prior to placement of the permanent silicone smooth round gel implants, the medial aspect of the subfascial pocket was scored with monopolar cautery to prepare a healing surface.  Several interrupted 3-0 Monocryl sutures were placed between the skin flaps overlying the pec muscle and sewn in place to help control the healing and to reduce the risk of recurrent symmastia. The permanent silicone gel implant placement was first done on the left side with complete pocket closure, followed by the right side. The same procedure was done on both sides. Next, each breast pocket was irrigated with 250 cc of antibiotic-containing sterile normal saline solution.  Hemostasis was again ensured using monopolar electrocautery.  There was no bleeding bilaterally.  Next, Betadine solution was irrigated into both breast pockets.  A total of 100 cc of the solution was used on each side.  Next, sterile gloves were exchanged for new ones.  Additional sterile blue towels were used to redraped around the breasts.  Only one permanent gel implant at a time was opened.  The implant was coated with antibiotic solution.  A Alvarez funnel was opened.  A Brit retractor was cleansed with antibiotic solution and positioned within the breast pocket.  The implant was then placed within the Alvarez funnel and delivered into the breast pocket, after ensuring the appropriate implant as well as positioning.  Next, three times three-point 0 PDS suture was placed from the chest wall to the superficial fascial system of both the inferior and superior breast skin flaps, to reestablish to the new inframammary fold.  Additionally, 2-0 Vicryl suture was used to reapproximate the SFS and to close the pocket.  The same procedure was done  on the contralateral side.  The skin was closed with 3-0 deep dermal and a running 3-0 intracuticular suture.  The incisions were dressed with Steri-Strips and skin adhesive.  Fluff dressing and Topifoam were used to secure the implants in their current position on the chest wall.  Patient was then extubated uneventfully, and transferred to the postanesthesia care unit without event.     Postoperative plan:  Patient will be seen in approximately 2 weeks for reevaluation.  Patient was instructed to leave the foam tape in place for 36 to 48 hours.  After which, patient will be able to shower.  Once showered, patient was instructed to keep the incision clean and to allow the Steri-Strips to fall off on their own.  Additionally, patient was instructed to engage in no strenuous activity that would raise her heart rate above 100 bpm or lifting involving anything heavier than 3 to 5 pounds.     Cristobal Reeves MD, PhD, FACS

## 2025-02-18 NOTE — ANESTHESIA PROCEDURE NOTES
Airway       Patient location during procedure: OR       Procedure Start/Stop Times: 2/18/2025 1:18 PM  Staff -        CRNA: Kerrie Vazquez APRN CRNA       Performed By: CRNA  Consent for Airway        Urgency: elective  Indications and Patient Condition       Indications for airway management: ioana-procedural       Induction type:intravenous       Mask difficulty assessment: 1 - vent by mask    Final Airway Details       Final airway type: endotracheal airway       Successful airway: ETT - single and Oral  Endotracheal Airway Details        ETT size (mm): 7.0       Cuffed: yes       Successful intubation technique: direct laryngoscopy       DL Blade Type: Fishman 2       Grade View of Cords: 1       Adjucts: stylet       Position: Right       Measured from: lips       Secured at (cm): 22       Bite block used: None    Post intubation assessment        Placement verified by: capnometry        Number of attempts at approach: 1       Number of other approaches attempted: 0       Secured with: tape       Ease of procedure: easy       Dentition: Intact and Unchanged    Medication(s) Administered   Medication Administration Time: 2/18/2025 1:18 PM

## 2025-02-18 NOTE — BRIEF OP NOTE
[facility] [place]    Brief Operative Note    Pre-operative diagnosis: Gender dysphoria [F64.9]  Post-operative diagnosis Same as pre-operative diagnosis    Procedure: Bilateral feminizing breast augmentation revision with exchange and downsizing of silicone smooth round gel implants, inferior wedge excision of skin and breast tissue, symmastia correction, Bilateral - Breast    Surgeon: Surgeons and Role:     * Cristobal Reeves MD - Primary  Anesthesia: General   Estimated Blood Loss: 5 ml     Drains: None  Specimens:   ID Type Source Tests Collected by Time Destination   1 : Left Breast Implant Implant Breast, Left SURGICAL PATHOLOGY EXAM Cristobal Reeves MD 2/18/2025  1:52 PM    2 : Right Breast Implant Implant Breast, Right SURGICAL PATHOLOGY EXAM Cristobal Reeves MD 2/18/2025  2:00 PM      Findings:   Refer to Op Note .  Complications: None.  Implants:   Implant Name Type Inv. Item Serial No.  Lot No. LRB No. Used Action   [employer] Cohesive Breast Implant Smooth Round FULL PROFILE SCF-450   [id number]  [id number] Left 1 Explanted   [employer] Cohesive Breast Implant Smooth Round FULL PROFILE SCF-450   [id number]  [id number] Right 1 Explanted   IMP BREAST [employer] FULL PROFILE SOFFTOUCH SSF-365 - [id number] Breast Implant/Tissue Expander IMP BREAST [employer] FULL PROFILE SOFFTOUCH SSF-365 [id number] [employer]  Left 1 Implanted   IMP BREAST [employer] FULL PROFILE SOFFTOUCH SSF-365 - [id number] Breast Implant/Tissue Expander IMP BREAST [employer] FULL PROFILE SOFFTOUCH SSF-365 [id number] [employer]  Right 1 Implanted     Linda Haas MD  Plastic Surgery Resident, PGY-1

## 2025-02-18 NOTE — ANESTHESIA POSTPROCEDURE EVALUATION
Patient: Ilan Bazzi    Procedure: Procedure(s):  Bilateral feminizing breast augmentation revision with exchange and downsizing of silicone smooth round gel implants, inferior wedge excision of skin and breast tissue, symmastia correction       Anesthesia Type:  General    Note:  Disposition: Outpatient   Postop Pain Control: Uneventful            Sign Out: Well controlled pain   PONV: No   Neuro/Psych: Uneventful            Sign Out: Acceptable/Baseline neuro status   Airway/Respiratory: Uneventful            Sign Out: Acceptable/Baseline resp. status   CV/Hemodynamics: Uneventful            Sign Out: Acceptable CV status   Other NRE: NONE   DID A NON-ROUTINE EVENT OCCUR? No           Last vitals:  Vitals Value Taken Time   /78 02/18/25 1545   Temp 36.3  C (97.3  F) 02/18/25 1545   Pulse 64 02/18/25 1545   Resp 7 02/18/25 1545   SpO2 100 % 02/18/25 1545   Vitals shown include unfiled device data.    Electronically Signed By: Pee Simmons MD  February 18, 2025  4:45 PM

## 2025-02-18 NOTE — DISCHARGE INSTRUCTIONS
"[facility]  Home Care Following Anesthesia  For 24 hours after surgery:  Get plenty of rest.  A responsible adult must stay with you for at least 24 hours after you leave the surgery center.  Do not drive or use heavy equipment.  If you have weakness or tingling, don't drive or use heavy equipment until this feeling goes away.   Do not drink alcohol.   Avoid strenuous or risky activities.  Ask for help when climbing stairs.  You may feel lightheaded.  IF so, sit for a few minutes before standing.  Have someone help you get up.   If you have nausea (feel sick to your stomach): Drink only clear liquids such as apple juice, ginger ale, broth or 7-Up.  Rest may also help.  Be sure to drink enough fluids.  Move to a regular diet as you feel able.   You may have a slight fever.  Call the doctor if your fever is over 100 F (37.7 C) (taken under the tongue) or lasts longer than 24 hours.  You may have a dry mouth, a sore throat, muscle aches or trouble sleeping. These should go away after 24 hours.  Do not make important or legal decisions.   It is recommended to avoid smoking.        Today you received a Marcaine or bupivacaine block to numb the nerves near your surgery site.  This is a block using local anesthetic or \"numbing\" medication injected around the nerves to anesthetize or \"numb\" the area supplied by those nerves.  This block is injected into the muscle layer near your surgical site.  The medication may numb the location where you had surgery for 6-18 hours, but may last up to 24 hours.  If your surgical site is an arm or leg you should be careful with your affected limb, since it is possible to injure your limb without being aware of it due to the numbing.  Until full feeling returns, you should guard against bumping or hitting your limb, and avoid extreme hot or cold temperatures on the skin.  As the block wears off, the feeling will return as a tingling or prickly " sensation near your surgical site.  You will experience more discomfort from your incision as the feeling returns.  You may want to take a pain pill (a narcotic or Tylenol if this was prescribed by your surgeon) when you start to experience mild pain before the pain beccomes more severe.  If your pain medications do not control your pain you should notifiy your surgeon.    Tips for taking pain medications  To get the best pain relief possible, remember these points:  Take pain medications as directed, before pain becomes severe.  Pain medication can upset your stomach: taking it with food may help.  Constipation is a common side effect of pain medication. Drink plenty of  fluids.  Eat foods high in fiber. Take a stool softener if recommended by your doctor or pharmacist.  Do not drink alcohol, drive or operate machinery while taking pain medications.  Ask about other ways to control pain, such as with heat, ice or relaxation.    Tylenol/Acetaminophen Consumption    If you feel your pain relief is insufficient, you may take Tylenol/Acetaminophen in addition to your narcotic pain medication.   Be careful not to exceed 4,000 mg of Tylenol/Acetaminophen in a 24 hour period from all sources.  If you are taking extra strength Tylenol/acetaminophen (500 mg), the maximum dose is 8 tablets in 24 hours.  If you are taking regular strength acetaminophen (325 mg), the maximum dose is 12 tablets in 24 hours.    Call a doctor for any of the following:  Signs of infection (fever, growing tenderness at the surgery site, a large amount of drainage or bleeding, severe pain, foul-smelling drainage, redness, swelling).  It has been over 8 to 10 hours since surgery and you are still not able to urinate (pass water).  Headache for over 24 hours.      Your doctor is:       Dr. Cristobal Reeves, Plastic Surgery: [phone]      (Monday-Friday 8 am to 4 pm)       After hours and weekends call the hospital @ [phone] and ask for the  resident on call for:  Plastics  For emergency care, call the:  [facility]:  [phone] (TTY for hearing impaired: [phone])  B/L Revision Feminizing BREAST AUGMENTATION   POST-OPERATIVE INSTRUCTIONS    Instructions    *Have someone drive you home after surgery and help you at home for 1-2     days.    *Get plenty of rest and follow balanced diet.      *Decreased activity may promote constipation, so you may want to add more raw fruit to your diet, and be sure to increase fluid intake. Take pain medication as prescribed.    *Do not take aspirin or any products containing aspirin unless approved by your surgeon.    *Do not drink alcohol when taking pain medications.    *Even when not taking pain medications, no alcohol for 3 weeks as it      causes fluid retention.    *If you are taking vitamins with iron, resume these as tolerated.    *Do not smoke, as smoking delays healing and increases the risk of     complications.    Activities   *Start walking as soon as possible, this helps to reduce swelling and     lowers the chance of blood clots.    *Do not drive until you are no longer taking any pain medications     (narcotics).   *No lifting more than a gallon of milk (5 lbs) for 3 weeks   *Do not drive until you have full range of motion with your arms.   *Refrain from vigorous activity for 4 weeks   *Restrict excessive use of arms for at least 5-7 days.   *Refrain from physical contact with breasts for 2 weeks.     *Body contact sports should be avoided for 6-8 weeks.   *Social and employment activities can be resumed in 3-10 days.    Incision Care   *You may shower in 48 hours   *Keep surgical tape on until it peels off.   *Keep incisions clean and inspect daily for signs of infection.   *No tub soaking, bathing, or swimming while sutures are in place.   *You may pad the incisions with gauze for comfort.   *Wear garments (bra) as directed by surgeon.   *Do not wear underwire bra for 6  weeks   *Refrain from sleeping on your stomach for 2 weeks.   *Always use a strong sunblock, if sun exposure is unavoidable (SPF 50 or     greater).    What to Expect   *Expect some drainage onto the surgical tape covering the incisions.   *You are likely to feel tired for a few days, but you should be up and      around in 4-5 days.   *Maximum discomfort will occur in the first few days after surgery.   *You may experience some numbness of nipples and operative areas.   *You may experience temporary soreness, tightness, swelling and bruising     as well as some discomfort in the incision area.  Your breasts may be sensitive to stimulation for a few weeks.    Appearance   *Most of the discoloration and swelling will subside in 4-6 weeks.   *Scars may be red and angry looking for 6 months. In time, these usually     soften and fade.    Follow-Up Care   *Sutures will be dissolvable. They are under your skin and released at the     end of each incision. They are clear in appearance and will be trimmed to     the skin line in 1-2 weeks.   *Continue with routine mammograms at a radiology center where      technicians are experienced in the special techniques required with      Implants.    Please note my office will call you 1-2 business days after your procedure to check up on how you're doing and to schedule your post-operative appointment.     When to Call:   * If you have increased swelling or bruising.   *If swelling and redness persist for a few days.   *If you have increased redness along the incision.  If you have severe or increased pain   not relieved by medication.   *If you have any side effects to medications; such as, rash, nausea,      headache, vomiting or constipation.   *If you have an oral temperature over 100.4 degrees.   *If you have any yellowish or greenish drainage from the incisions or      notice a foul odor.   *If you have bleeding from the incisions that is difficult to control with      light  pressure.   *If you develop increased pain in your calves, shortness of breath, or chest     pain.    *If you develop any symptoms of concern.     For Medical Questions, Please Call:     [phone],  Monday - Friday, 8 a.m. - 4:30 p.m.     After hours and on weekends, call Hospital Paging at [phone] and     ask for the Plastic Surgeon on call.

## 2025-02-20 ENCOUNTER — PATIENT OUTREACH (OUTPATIENT)
Dept: PLASTIC SURGERY | Facility: CLINIC | Age: 30
End: 2025-02-20
Payer: COMMERCIAL

## 2025-02-20 NOTE — PATIENT INSTRUCTIONS
Called pt today for post op check in. Pt updated that she is feeling well, just very sleepy. Pain is controlled, she is eating and drinking, going to the bathroom without issue. Her partner will be assisting her to shower later today. She knows to reach out through [facility] or calling this writer directly with any issues. Post op appt scheduled for 3/4. Bin BELLO RN

## 2025-02-27 NOTE — PROGRESS NOTES
Plastic Surgery Outpatient Visit    ID: Ilan Bazzi is a 29 year old female s/p Bilateral feminizing breast augmentation revision with exchange and downsizing of silicone smooth round gel implants, inferior wedge excision of skin and breast tissue, symmastia correction - Bilateral 2/18/2025 with Dr. Reeves. Allergan 365 SSF silicone gel implants bilaterally     S: overall doing well. Denies pain, never took more than ibuprofen.     O:  /75 (BP Location: Left arm, Patient Position: Sitting, Cuff Size: Adult Regular)   Pulse 86   Temp 98  F (36.7  C) (Oral)   Wt 76.7 kg (169 lb)   SpO2 99%   BMI 23.57 kg/m     General: NAD  Chest: bilateral breast implants intact, high on chest well. Incisions c/d/I, mild irritation to edges but no spreading erythema. Presternal skin healing down well. Nipples intact and viable.     Path:   In process    A/P:  -healing well  -aquafor to incisions for 2 weeks, then ok to start scar care  -Encourage patient to use sports bras or bras that do not have underwire for the next few weeks. Gauze over incisions to avoid band rubbing on incisions.   -Limit lifting to 10 pounds for the next 2 weeks, followed by 15-20 pounds during postoperative weeks 5-6. No pushups until 3 months post op.   -No soaking or swimming until 5-6 weeks postoperatively  -discussed sternal bolster for 2 more weeks with foam or kerlix roll  -monitor for signs of infection, call with any concerns  -Return to clinic ~6 weeks for reevaluation with Dr. SKYE Suarez PA-C  Plastic and Reconstructive Surgery    20 minutes spent on the date of the encounter doing chart review, history and physical, dressing changes, documentation and further activity as noted above.

## 2025-03-04 ENCOUNTER — OFFICE VISIT (OUTPATIENT)
Dept: PLASTIC SURGERY | Facility: CLINIC | Age: 30
End: 2025-03-04
Payer: COMMERCIAL

## 2025-03-04 VITALS
OXYGEN SATURATION: 99 % | DIASTOLIC BLOOD PRESSURE: 75 MMHG | BODY MASS INDEX: 23.57 KG/M2 | HEART RATE: 86 BPM | WEIGHT: 169 LBS | SYSTOLIC BLOOD PRESSURE: 114 MMHG | TEMPERATURE: 98 F

## 2025-03-04 DIAGNOSIS — F64.9 GENDER DYSPHORIA: ICD-10-CM

## 2025-03-04 DIAGNOSIS — Z98.82 S/P AUGMENTATION MAMMAPLASTY: Primary | ICD-10-CM

## 2025-03-04 ASSESSMENT — PAIN SCALES - GENERAL: PAINLEVEL_OUTOF10: NO PAIN (0)

## 2025-03-04 NOTE — NURSING NOTE
Chief Complaint   Patient presents with    Surgical Followup     Post-op 10-14 days, DOS 2/18/2025       Vitals:    03/04/25 0943   BP: 114/75   BP Location: Left arm   Patient Position: Sitting   Cuff Size: Adult Regular   Pulse: 86   Temp: 98  F (36.7  C)   TempSrc: Oral   SpO2: 99%   Weight: 76.7 kg (169 lb)       Body mass index is 23.57 kg/m .                          Tamia Palmer, EMT

## 2025-03-04 NOTE — LETTER
3/4/2025       RE: Ilan Bazzi  [address]  [place] [address]     Dear Colleague,    Thank you for referring your patient, Ilan Bazzi, to the [facility] [place] at [facility]. Please see a copy of my visit note below.    Plastic Surgery Outpatient Visit    ID: Ilan Bazzi is a 29 year old female s/p Bilateral feminizing breast augmentation revision with exchange and downsizing of silicone smooth round gel implants, inferior wedge excision of skin and breast tissue, symmastia correction - Bilateral 2/18/2025 with Dr. Reeves. Allergan 365 SSF silicone gel implants bilaterally     S: overall doing well. Denies pain, never took more than ibuprofen.     O:  /75 (BP Location: Left arm, Patient Position: Sitting, Cuff Size: Adult Regular)   Pulse 86   Temp 98  F (36.7  C) (Oral)   Wt 76.7 kg (169 lb)   SpO2 99%   BMI 23.57 kg/m     General: NAD  Chest: bilateral breast implants intact, high on chest well. Incisions c/d/I, mild irritation to edges but no spreading erythema. Presternal skin healing down well. Nipples intact and viable.     Path:   In process    A/P:  -healing well  -aquafor to incisions for 2 weeks, then ok to start scar care  -Encourage patient to use sports bras or bras that do not have underwire for the next few weeks. Gauze over incisions to avoid band rubbing on incisions.   -Limit lifting to 10 pounds for the next 2 weeks, followed by 15-20 pounds during postoperative weeks 5-6. No pushups until 3 months post op.   -No soaking or swimming until 5-6 weeks postoperatively  -discussed sternal bolster for 2 more weeks with foam or kerlix roll  -monitor for signs of infection, call with any concerns  -Return to clinic ~6 weeks for reevaluation with SHILOH HernandezC  Plastic and Reconstructive Surgery    20 minutes spent on the date of the encounter doing chart  review, history and physical, dressing changes, documentation and further activity as noted above.       Again, thank you for allowing me to participate in the care of your patient.      Sincerely,    Shobha Suarez PA-C

## 2025-03-12 ENCOUNTER — MYC REFILL (OUTPATIENT)
Dept: FAMILY MEDICINE | Facility: CLINIC | Age: 30
End: 2025-03-12
Payer: COMMERCIAL

## 2025-03-12 DIAGNOSIS — F64.9 GENDER DYSPHORIA: ICD-10-CM

## 2025-03-12 DIAGNOSIS — F90.9 ATTENTION DEFICIT HYPERACTIVITY DISORDER (ADHD), UNSPECIFIED ADHD TYPE: ICD-10-CM

## 2025-03-12 RX ORDER — DEXTROAMPHETAMINE SACCHARATE, AMPHETAMINE ASPARTATE MONOHYDRATE, DEXTROAMPHETAMINE SULFATE, AMPHETAMINE SULFATE 6.25; 6.25; 6.25; 6.25 MG/1; MG/1; MG/1; MG/1
25 CAPSULE, EXTENDED RELEASE ORAL DAILY
Qty: 30 CAPSULE | Refills: 0 | Status: SHIPPED | OUTPATIENT
Start: 2025-03-12

## 2025-03-12 RX ORDER — DEXTROAMPHETAMINE SACCHARATE, AMPHETAMINE ASPARTATE, DEXTROAMPHETAMINE SULFATE AND AMPHETAMINE SULFATE 2.5; 2.5; 2.5; 2.5 MG/1; MG/1; MG/1; MG/1
TABLET ORAL
Qty: 30 TABLET | Refills: 0 | Status: SHIPPED | OUTPATIENT
Start: 2025-03-12

## 2025-03-12 RX ORDER — ESTRADIOL 2 MG/1
4 TABLET ORAL 2 TIMES DAILY
Qty: 120 TABLET | Refills: 5 | Status: SHIPPED | OUTPATIENT
Start: 2025-03-12

## 2025-03-12 NOTE — TELEPHONE ENCOUNTER
"Request for medication refill:    Medication Name: Amphet-Dextroamphet 3-Bead ER (MYDAYIS) 25 MG CP24     amphetamine-dextroamphetamine (ADDERALL) 10 MG tablet     estradiol (ESTRACE) 2 MG tablet     Providers if patient needs an appointment and you are willing to give a one month supply please refill for one month and  send a MyChart using \".SMILLIMITEDREFILL\" .Or route chart to \"[facility] \" . To call patient and inform of limited refill and providers request to make an appointment. (Giving one month refill in non controlled medications is strongly recommended before denial)    If refill has been denied, meaning absolutely no refills without visit, please complete the smart phrase \".SMIRXREFUSE\" and route it to the \"[facility] MED REFILLS\"  pool to inform the patient and the pharmacy.    Raghu Cisneros, CMA      "

## 2025-04-23 ENCOUNTER — OFFICE VISIT (OUTPATIENT)
Dept: PLASTIC SURGERY | Facility: CLINIC | Age: 30
End: 2025-04-23
Payer: COMMERCIAL

## 2025-04-23 VITALS
SYSTOLIC BLOOD PRESSURE: 145 MMHG | HEIGHT: 71 IN | OXYGEN SATURATION: 98 % | DIASTOLIC BLOOD PRESSURE: 79 MMHG | BODY MASS INDEX: 23.21 KG/M2 | HEART RATE: 102 BPM | WEIGHT: 165.8 LBS

## 2025-04-23 DIAGNOSIS — Z98.82 STATUS POST BREAST AUGMENTATION: Primary | ICD-10-CM

## 2025-04-23 ASSESSMENT — PAIN SCALES - GENERAL: PAINLEVEL_OUTOF10: NO PAIN (0)

## 2025-04-23 NOTE — LETTER
4/23/2025       RE: Ilan Bazzi  [address]  [place] [address]     Dear Colleague,    Thank you for referring your patient, Ilan Bazzi, to the [facility] RECON[facility] SURGERY CLIN[facility] [place] at [facility]. Please see a copy of my visit note below.    PRS    Doing well.  No issues.    On exam, bilateral breast incisions are well-healing with no wounds or signs of infection.  Bilateral breast implants in appropriate position, with the left breast implant slightly more distended and medialized as compared to the right.    A/P: 29-year-old trans female 6-week status post bilateral feminizing breast augmentation revision with exchange and downsizing of silicone smooth round gel implants with symmastia correction    - Patient can continue scar treatment with silicone sheeting and/or gentle circular massage with bio oil or silicone based ointment  -Continue to wear supportive bra when upright for the next 6 weeks  -No push-ups or strenuous activities that involve pectoralis major activation for the next 6 weeks  -Return to clinic in approximately 2 months for reevaluation.  We can discuss possible subtle improved correction on the left.  We would have to consider using a mesh.  Patient is agreeable.  -Photography today    Cristobal Reeves MD, PhD, FACS      Again, thank you for allowing me to participate in the care of your patient.      Sincerely,    Cristobal Reeves MD

## 2025-04-23 NOTE — PATIENT INSTRUCTIONS
Per Dr. Reeves's review:    Ok to return to working out, please avoid push ups, or planks and any other exercise that activate pectoralis major muscle.        Appointment follow up:   3-4after surgery.  You will receive a phone call to assist you with scheduling this with Dr. Reeves.

## 2025-04-23 NOTE — PROGRESS NOTES
PRS    Doing well.  No issues.    On exam, bilateral breast incisions are well-healing with no wounds or signs of infection.  Bilateral breast implants in appropriate position, with the left breast implant slightly more distended and medialized as compared to the right.    A/P: 29-year-old trans female 6-week status post bilateral feminizing breast augmentation revision with exchange and downsizing of silicone smooth round gel implants with symmastia correction    - Patient can continue scar treatment with silicone sheeting and/or gentle circular massage with bio oil or silicone based ointment  -Continue to wear supportive bra when upright for the next 6 weeks  -No push-ups or strenuous activities that involve pectoralis major activation for the next 6 weeks  -Return to clinic in approximately 2 months for reevaluation.  We can discuss possible subtle improved correction on the left.  We would have to consider using a mesh.  Patient is agreeable.  -Photography today    Cristobal Reeves MD, PhD, FACS

## 2025-04-23 NOTE — NURSING NOTE
"Chief Complaint   Patient presents with    Surgical Followup     2 month post-op, DOS 2/18/25.       Vitals:    04/23/25 1258   BP: (!) 145/79   BP Location: Left arm   Patient Position: Sitting   Cuff Size: Adult Regular   Pulse: 102   SpO2: 98%   Weight: 165 lb 12.8 oz   Height: 5' 11\"       Body mass index is 23.12 kg/m .      Richy Villalpando, EMT    "

## 2025-06-21 LAB
PATH REPORT.COMMENTS IMP SPEC: NORMAL
PATH REPORT.COMMENTS IMP SPEC: NORMAL
PATH REPORT.FINAL DX SPEC: NORMAL
PATH REPORT.GROSS SPEC: NORMAL
PATH REPORT.RELEVANT HX SPEC: NORMAL
PHOTO IMAGE: NORMAL

## 2025-07-14 ENCOUNTER — MYC REFILL (OUTPATIENT)
Dept: FAMILY MEDICINE | Facility: CLINIC | Age: 30
End: 2025-07-14
Payer: COMMERCIAL

## 2025-07-14 DIAGNOSIS — F90.9 ATTENTION DEFICIT HYPERACTIVITY DISORDER (ADHD), UNSPECIFIED ADHD TYPE: ICD-10-CM

## 2025-07-14 NOTE — TELEPHONE ENCOUNTER
"Request for medication refill:    Medication Name: Amphet-Dextroamphet 3-Bead ER (MYDAYIS) 25 MG CP24     amphetamine-dextroamphetamine (ADDERALL) 10 MG tablet     Providers if patient needs an appointment and you are willing to give a one month supply please refill for one month and  send a MyChart using \".SMILLIMITEDREFILL\" .Or route chart to \"[facility] \" . And use the phrase \" SMIRXFOLLOWUP\"To call patient and inform of limited refill and providers request to make an appointment. (Giving one month refill in non controlled medications is strongly recommended before denial)    If refill has been denied, meaning absolutely no refills without visit, please complete the smart phrase \".SMIRXREFUSE\" and route it to the \"[facility] MED REFILLS\"  pool to inform the patient and the pharmacy.    Raghu Cisneros, CMA      "

## 2025-07-15 RX ORDER — DEXTROAMPHETAMINE SACCHARATE, AMPHETAMINE ASPARTATE, DEXTROAMPHETAMINE SULFATE AND AMPHETAMINE SULFATE 2.5; 2.5; 2.5; 2.5 MG/1; MG/1; MG/1; MG/1
TABLET ORAL
Qty: 30 TABLET | Refills: 0 | Status: SHIPPED | OUTPATIENT
Start: 2025-07-15

## 2025-07-15 RX ORDER — DEXTROAMPHETAMINE SACCHARATE, AMPHETAMINE ASPARTATE MONOHYDRATE, DEXTROAMPHETAMINE SULFATE, AMPHETAMINE SULFATE 6.25; 6.25; 6.25; 6.25 MG/1; MG/1; MG/1; MG/1
25 CAPSULE, EXTENDED RELEASE ORAL DAILY
Qty: 30 CAPSULE | Refills: 0 | Status: SHIPPED | OUTPATIENT
Start: 2025-07-15

## 2025-08-01 PROBLEM — F64.0 GENDER DYSPHORIA IN ADULT: Status: ACTIVE | Noted: 2025-08-01

## 2025-08-10 ENCOUNTER — MYC REFILL (OUTPATIENT)
Dept: FAMILY MEDICINE | Facility: CLINIC | Age: 30
End: 2025-08-10
Payer: COMMERCIAL

## 2025-08-10 DIAGNOSIS — F90.9 ATTENTION DEFICIT HYPERACTIVITY DISORDER (ADHD), UNSPECIFIED ADHD TYPE: ICD-10-CM

## 2025-08-11 ENCOUNTER — TELEPHONE (OUTPATIENT)
Dept: FAMILY MEDICINE | Facility: CLINIC | Age: 30
End: 2025-08-11
Payer: COMMERCIAL

## 2025-08-11 RX ORDER — DEXTROAMPHETAMINE SACCHARATE, AMPHETAMINE ASPARTATE MONOHYDRATE, DEXTROAMPHETAMINE SULFATE, AMPHETAMINE SULFATE 6.25; 6.25; 6.25; 6.25 MG/1; MG/1; MG/1; MG/1
25 CAPSULE, EXTENDED RELEASE ORAL DAILY
Qty: 30 CAPSULE | Refills: 0 | Status: SHIPPED | OUTPATIENT
Start: 2025-08-11

## 2025-08-11 RX ORDER — DEXTROAMPHETAMINE SACCHARATE, AMPHETAMINE ASPARTATE, DEXTROAMPHETAMINE SULFATE AND AMPHETAMINE SULFATE 2.5; 2.5; 2.5; 2.5 MG/1; MG/1; MG/1; MG/1
TABLET ORAL
Qty: 30 TABLET | Refills: 0 | Status: SHIPPED | OUTPATIENT
Start: 2025-08-11

## (undated) DEVICE — LABEL MEDICATION SYSTEM 3303-P

## (undated) DEVICE — ADH SKIN CLOSURE PREMIERPRO EXOFIN 1.0ML 3470

## (undated) DEVICE — SU MONOCRYL 4-0 PS-2 27" UND Y426H

## (undated) DEVICE — DRAPE POUCH IRR 1016

## (undated) DEVICE — ESU PENCIL SMOKE EVAC W/ROCKER SWITCH 0703-047-000

## (undated) DEVICE — Device

## (undated) DEVICE — SU DERMABOND ADVANCED .7ML DNX12

## (undated) DEVICE — BNDG ELASTIC 6"X5YDS UNSTERILE 6611-60

## (undated) DEVICE — UNIVERSAL DRAPE PACK 29118

## (undated) DEVICE — SU VICRYL 4-0 PC-3 18" UND J845G

## (undated) DEVICE — BOWL 32OZ STERILE 01232

## (undated) DEVICE — ESU ELEC BLADE 6" COATED/INSULATED E1455-6

## (undated) DEVICE — DRSG ABDOMINAL 07 1/2X8" 7197D

## (undated) DEVICE — BONE WAX 2.5GM W31G

## (undated) DEVICE — ESU GROUND PAD UNIVERSAL W/O CORD

## (undated) DEVICE — PACK ASC BREAST SBA1BPUMA / SB15BPUM1

## (undated) DEVICE — STPL SKIN 35W ROTATING HEAD PRW35

## (undated) DEVICE — FILTER HEPA FLUID TRAP NEPTUNE 0703-040-001

## (undated) DEVICE — STPL SKIN 35W 059037

## (undated) DEVICE — BUR OVAL LINVATEC 7X70MM CARBIDE 5092-236

## (undated) DEVICE — DRSG TELFA 3X8" 1238

## (undated) DEVICE — SOL WATER IRRIG 1000ML BOTTLE 2F7114

## (undated) DEVICE — COVER CAMERA IN-LIGHT DISP LT-C02

## (undated) DEVICE — PREP CHLORAPREP 26ML TINTED HI-LITE ORANGE 930815

## (undated) DEVICE — TUBING SET ASPIRATION W/EXT SONOPET  LF 5450-850-003

## (undated) DEVICE — DRSG KERLIX 4 1/2"X4YDS ROLL 6730

## (undated) DEVICE — GLOVE BIOGEL PI MICRO INDICATOR UNDERGLOVE SZ 8.0 48980

## (undated) DEVICE — SUCTION CANISTER MEDIVAC LINER 3000ML W/LID 65651-530

## (undated) DEVICE — SYR 10ML LL W/O NDL 302995

## (undated) DEVICE — NDL 18GA 1.5" FILTER

## (undated) DEVICE — SU PDS II 2-0 CT-1 27" Z339H

## (undated) DEVICE — GLOVE PROTEXIS W/NEU-THERA 6.5  2D73TE65

## (undated) DEVICE — TAPE MICROFOAM 3" 1528-3

## (undated) DEVICE — PREP POVIDONE IODINE SOLUTION 10% 120ML

## (undated) DEVICE — SPONGE COTTONOID 1/2X1" 20-05S

## (undated) DEVICE — GLOVE PROTEXIS W/NEU-THERA 8.5  2D73TE85

## (undated) DEVICE — COMB STERILE 7" PLASTIC 14-1200

## (undated) DEVICE — SU PDS II 0 CT-1 27" Z340H

## (undated) DEVICE — BASIN SET MINOR DISP

## (undated) DEVICE — DRAPE SHEET REV FOLD 3/4 9349

## (undated) DEVICE — GLOVE BIOGEL PI MICRO SZ 6.5 48565

## (undated) DEVICE — ESU PENCIL W/SMOKE EVAC NEPTUNE STRYKER 0703-046-000

## (undated) DEVICE — GLOVE BIOGEL PI MICRO INDICATOR UNDERGLOVE SZ 7.0 48970

## (undated) DEVICE — ESU ELEC BLADE 2.75" COATED/INSULATED E1455

## (undated) DEVICE — LINEN ORTHO PACK 5446

## (undated) DEVICE — SOL WATER IRRIG 500ML BOTTLE 2F7113

## (undated) DEVICE — SU PROLENE 6-0 PC-1 18" 8617G

## (undated) DEVICE — PHOTON GUIDE INVUITY WIDE FLAT 104015

## (undated) DEVICE — ESU ELEC NDL 1" COATED/INSULATED E1465

## (undated) DEVICE — SOL NACL 0.9% IRRIG 500ML BOTTLE 2F7123

## (undated) DEVICE — DRSG TEGADERM 4X4 3/4" 1626W

## (undated) DEVICE — PREP CHLORAPREP 26ML TINTED ORANGE  260815

## (undated) DEVICE — GLOVE PROTEXIS POWDER FREE 8.0 ORTHOPEDIC 2D73ET80

## (undated) DEVICE — ESU GROUND PAD ADULT W/CORD E7507

## (undated) DEVICE — DRSG STERI STRIP 1/2X4" R1547

## (undated) DEVICE — SU PDS II 2-0 SH 27" Z317H

## (undated) DEVICE — STRAP KNEE/BODY 31143004

## (undated) DEVICE — EYE SHIELD CORNEAL CROUCH  E5699

## (undated) DEVICE — BOWL STERILE 32OZ DYND50320

## (undated) DEVICE — PREP POVIDONE-IODINE 7.5% SCRUB 4OZ BOTTLE MDS093945

## (undated) DEVICE — NDL 25GA 1.5" 305127

## (undated) DEVICE — GOWN LG DISP 9515

## (undated) DEVICE — SPECIMEN CONTAINER 5OZ STERILE 2600SA

## (undated) DEVICE — LINEN TOWEL PACK X5 5464

## (undated) DEVICE — SOL NACL 0.9% IRRIG 1000ML BOTTLE 2F7124

## (undated) DEVICE — SYR EAR BULB 3OZ 0035830

## (undated) DEVICE — GLOVE BIOGEL PI MICRO SZ 7.5 48575

## (undated) DEVICE — DRSG TEGADERM 2 3/8X2 3/4" 1624W

## (undated) DEVICE — SUCTION SLEEVE NEPTUNE 2 165MM 0703-005-165

## (undated) DEVICE — BLADE SAW MICRO SAGITTAL LINVATEC 9.5X41X0.4MM 5023-140

## (undated) DEVICE — BUR WIREPASS 1X19MM 5091-247

## (undated) DEVICE — GLOVE PROTEXIS W/NEU-THERA 7.0  2D73TE70

## (undated) DEVICE — LIGHT HANDLE X1 31140133

## (undated) DEVICE — SU CHROMIC 3-0 PS-2 27" 1638H

## (undated) DEVICE — DRAPE IOBAN INCISE 23X17" 6650EZ

## (undated) DEVICE — ESU ELEC BLADE HEX-LOCKING 2.5" E1450X

## (undated) DEVICE — PREP SKIN SCRUB TRAY 4461A

## (undated) DEVICE — SUCTION TIP YANKAUER W/O VENT K86

## (undated) DEVICE — SUCTION MANIFOLD NEPTUNE 2 SYS 4 PORT 0702-020-000

## (undated) DEVICE — EYE PREP BETADINE 5% SOLUTION 30ML 0065-0411-30

## (undated) DEVICE — TAPE MEDIPORE 2"X2YD 2962S

## (undated) DEVICE — DRSG JAWBRA  95

## (undated) DEVICE — ESU ELEC BLADE 6" COATED E1450-6

## (undated) DEVICE — CATH TRAY FOLEY SURESTEP 16FR WDRAIN BAG STLK LATEX A300316A

## (undated) DEVICE — SPONGE PACK VAGINAL 2X36"

## (undated) DEVICE — SUCTION MANIFOLD NEPTUNE 2 SYS 1 PORT 702-025-000

## (undated) DEVICE — SU MONOCRYL 4-0 P-3 18" UND Y494G

## (undated) DEVICE — PACK MINOR CUSTOM ASC

## (undated) DEVICE — ADH LIQUID MASTISOL TOPICAL VIAL 2-3ML 0523-48

## (undated) DEVICE — LINEN TOWEL PACK X30 5481

## (undated) DEVICE — SU MONOCRYL 3-0 PS-2 27" Y427H

## (undated) DEVICE — SPONGE LAP 18X18" X8435

## (undated) DEVICE — SU MONOCRYL 3-0 PS-1 27" Y936H

## (undated) DEVICE — PREP CHLORHEXIDINE 4% 4OZ (HIBICLENS) 57504

## (undated) DEVICE — ESU CLEANER TIP 31142717

## (undated) DEVICE — BNDG KLING 3" 2232

## (undated) DEVICE — SOL NACL 0.9% INJ 1000ML BAG 2B1324X

## (undated) DEVICE — DRAIN JACKSON PRATT RESERVOIR 100ML SU130-1305

## (undated) DEVICE — SU VICRYL 3-0 SH CR 8X18" J774

## (undated) DEVICE — SU MONOCRYL 5-0 P-3 18" UND Y493G

## (undated) DEVICE — DRSG KERLIX FLUFFS X5

## (undated) DEVICE — SU VICRYL 2-0 CT-2 27" UND J269H

## (undated) DEVICE — SU SILK 2-0 SH 30" K833H

## (undated) DEVICE — SYR 10ML FINGER CONTROL W/O NDL 309695

## (undated) DEVICE — GLOVE PROTEXIS W/NEU-THERA 8.0  2D73TE80

## (undated) DEVICE — DRSG PRIMAPORE 03 1/8X6" 66000318

## (undated) DEVICE — PENCIL PRESHARPENED SOFT #2 1/PK  17701/50

## (undated) DEVICE — BUR OVAL LINVATEC 4X8MM 5091-236

## (undated) DEVICE — SU VICRYL 3-0 SH 8X18" UND J864D

## (undated) DEVICE — PEN MARKING SKIN W/PAPER RULER 31145785

## (undated) DEVICE — DRAIN JACKSON PRATT CHANNEL 10FR RND SIL W/TROCAR JP-2227

## (undated) DEVICE — SYR BULB IRRIG DOVER 60 ML LATEX FREE 67000

## (undated) DEVICE — DRAPE U SPLIT 74X120" 29440

## (undated) DEVICE — ESU CORD BIPOLAR GREEN 10-4000

## (undated) DEVICE — TUBING SUCTION MEDI-VAC 1/4"X20' N620A

## (undated) DEVICE — PACK HEAD NECK SEN15HNFSF

## (undated) DEVICE — SOL NACL 0.9% IRRIG 1000ML BOTTLE 07138-09

## (undated) DEVICE — GLOVE GAMMEX NEOPRENE ULTRA SZ 7.5 LF 8515

## (undated) DEVICE — SU VICRYL 4-0 PS-2 18" UND J496H

## (undated) DEVICE — SU PDS II 3-0 SH 27" Z316H

## (undated) DEVICE — BLADE KNIFE SURG 10 371110

## (undated) DEVICE — NEPTUNE SMOKE EVAC ADAPTER

## (undated) DEVICE — GLOVE PROTEXIS POWDER FREE 7.5 ORTHOPEDIC 2D73ET75

## (undated) DEVICE — LINEN GOWN X4 5410

## (undated) DEVICE — APPLICATORS COTTON TIP 6"X2 STERILE LF C15053-006

## (undated) DEVICE — TUBING STRYKER IRRIGATION CASSETTE 5400-050-001

## (undated) DEVICE — GOWN XLG DISP 9545

## (undated) DEVICE — DRILL TWIST STRK 1.35X50MM 5MM STOP 6013505

## (undated) DEVICE — BLADE KNIFE SURG 15 371115

## (undated) DEVICE — SU PDS II 3-0 RB-1 27" Z305H

## (undated) RX ORDER — FENTANYL CITRATE-0.9 % NACL/PF 10 MCG/ML
PLASTIC BAG, INJECTION (ML) INTRAVENOUS
Status: DISPENSED
Start: 2025-02-18

## (undated) RX ORDER — CHLORHEXIDINE GLUCONATE ORAL RINSE 1.2 MG/ML
SOLUTION DENTAL
Status: DISPENSED
Start: 2017-06-08

## (undated) RX ORDER — BACITRACIN 500 [USP'U]/G
OINTMENT OPHTHALMIC
Status: DISPENSED
Start: 2021-03-09

## (undated) RX ORDER — PROPOFOL 10 MG/ML
INJECTION, EMULSION INTRAVENOUS
Status: DISPENSED
Start: 2025-02-18

## (undated) RX ORDER — OXYMETAZOLINE HYDROCHLORIDE 0.05 G/100ML
SPRAY NASAL
Status: DISPENSED
Start: 2017-06-08

## (undated) RX ORDER — TRANEXAMIC ACID 100 MG/ML
INJECTION, SOLUTION INTRAVENOUS
Status: DISPENSED
Start: 2023-05-09

## (undated) RX ORDER — BUPIVACAINE HYDROCHLORIDE AND EPINEPHRINE 2.5; 5 MG/ML; UG/ML
INJECTION, SOLUTION INFILTRATION; PERINEURAL
Status: DISPENSED
Start: 2021-03-09

## (undated) RX ORDER — ACETAMINOPHEN 325 MG/1
TABLET ORAL
Status: DISPENSED
Start: 2025-02-18

## (undated) RX ORDER — HYDROMORPHONE HYDROCHLORIDE 1 MG/ML
INJECTION, SOLUTION INTRAMUSCULAR; INTRAVENOUS; SUBCUTANEOUS
Status: DISPENSED
Start: 2017-06-08

## (undated) RX ORDER — PROPOFOL 10 MG/ML
INJECTION, EMULSION INTRAVENOUS
Status: DISPENSED
Start: 2021-03-09

## (undated) RX ORDER — HYDROMORPHONE HYDROCHLORIDE 1 MG/ML
INJECTION, SOLUTION INTRAMUSCULAR; INTRAVENOUS; SUBCUTANEOUS
Status: DISPENSED
Start: 2021-03-09

## (undated) RX ORDER — EPHEDRINE SULFATE 50 MG/ML
INJECTION, SOLUTION INTRAMUSCULAR; INTRAVENOUS; SUBCUTANEOUS
Status: DISPENSED
Start: 2023-05-09

## (undated) RX ORDER — ERYTHROMYCIN 5 MG/G
OINTMENT OPHTHALMIC
Status: DISPENSED
Start: 2019-08-27

## (undated) RX ORDER — CEFAZOLIN SODIUM 1 G/3ML
INJECTION, POWDER, FOR SOLUTION INTRAMUSCULAR; INTRAVENOUS
Status: DISPENSED
Start: 2025-02-18

## (undated) RX ORDER — ONDANSETRON 2 MG/ML
INJECTION INTRAMUSCULAR; INTRAVENOUS
Status: DISPENSED
Start: 2025-02-18

## (undated) RX ORDER — HYDROMORPHONE HYDROCHLORIDE 1 MG/ML
INJECTION, SOLUTION INTRAMUSCULAR; INTRAVENOUS; SUBCUTANEOUS
Status: DISPENSED
Start: 2025-02-18

## (undated) RX ORDER — CEFAZOLIN SODIUM 2 G/50ML
SOLUTION INTRAVENOUS
Status: DISPENSED
Start: 2023-05-09

## (undated) RX ORDER — DEXMEDETOMIDINE HYDROCHLORIDE 4 UG/ML
INJECTION, SOLUTION INTRAVENOUS
Status: DISPENSED
Start: 2023-05-09

## (undated) RX ORDER — LIDOCAINE HYDROCHLORIDE AND EPINEPHRINE 10; 10 MG/ML; UG/ML
INJECTION, SOLUTION INFILTRATION; PERINEURAL
Status: DISPENSED
Start: 2017-06-08

## (undated) RX ORDER — GLYCOPYRROLATE 0.2 MG/ML
INJECTION, SOLUTION INTRAMUSCULAR; INTRAVENOUS
Status: DISPENSED
Start: 2017-06-08

## (undated) RX ORDER — PROPOFOL 10 MG/ML
INJECTION, EMULSION INTRAVENOUS
Status: DISPENSED
Start: 2023-05-09

## (undated) RX ORDER — CEFAZOLIN SODIUM 2 G/100ML
INJECTION, SOLUTION INTRAVENOUS
Status: DISPENSED
Start: 2017-06-08

## (undated) RX ORDER — FENTANYL CITRATE 50 UG/ML
INJECTION, SOLUTION INTRAMUSCULAR; INTRAVENOUS
Status: DISPENSED
Start: 2023-05-09

## (undated) RX ORDER — FENTANYL CITRATE 50 UG/ML
INJECTION, SOLUTION INTRAMUSCULAR; INTRAVENOUS
Status: DISPENSED
Start: 2017-06-08

## (undated) RX ORDER — LIDOCAINE HYDROCHLORIDE 20 MG/ML
INJECTION, SOLUTION EPIDURAL; INFILTRATION; INTRACAUDAL; PERINEURAL
Status: DISPENSED
Start: 2017-06-08

## (undated) RX ORDER — BALANCED SALT SOLUTION 6.4; .75; .48; .3; 3.9; 1.7 MG/ML; MG/ML; MG/ML; MG/ML; MG/ML; MG/ML
SOLUTION OPHTHALMIC
Status: DISPENSED
Start: 2021-03-09

## (undated) RX ORDER — PROPOFOL 10 MG/ML
INJECTION, EMULSION INTRAVENOUS
Status: DISPENSED
Start: 2017-06-08

## (undated) RX ORDER — GENTAMICIN 40 MG/ML
INJECTION, SOLUTION INTRAMUSCULAR; INTRAVENOUS
Status: DISPENSED
Start: 2025-02-18

## (undated) RX ORDER — ACETAMINOPHEN 325 MG/1
TABLET ORAL
Status: DISPENSED
Start: 2023-05-09

## (undated) RX ORDER — HYDROMORPHONE HYDROCHLORIDE 1 MG/ML
INJECTION, SOLUTION INTRAMUSCULAR; INTRAVENOUS; SUBCUTANEOUS
Status: DISPENSED
Start: 2023-05-09

## (undated) RX ORDER — OXYCODONE HYDROCHLORIDE 5 MG/1
TABLET ORAL
Status: DISPENSED
Start: 2025-02-18

## (undated) RX ORDER — ONDANSETRON 2 MG/ML
INJECTION INTRAMUSCULAR; INTRAVENOUS
Status: DISPENSED
Start: 2023-05-09

## (undated) RX ORDER — ONDANSETRON 2 MG/ML
INJECTION INTRAMUSCULAR; INTRAVENOUS
Status: DISPENSED
Start: 2017-06-08

## (undated) RX ORDER — LIDOCAINE HYDROCHLORIDE AND EPINEPHRINE 10; 10 MG/ML; UG/ML
INJECTION, SOLUTION INFILTRATION; PERINEURAL
Status: DISPENSED
Start: 2025-02-18

## (undated) RX ORDER — ACETAMINOPHEN 325 MG/10.15ML
LIQUID ORAL
Status: DISPENSED
Start: 2021-03-09

## (undated) RX ORDER — CEFAZOLIN SODIUM 2 G/100ML
INJECTION, SOLUTION INTRAVENOUS
Status: DISPENSED
Start: 2021-03-09

## (undated) RX ORDER — LIDOCAINE HYDROCHLORIDE AND EPINEPHRINE BITARTRATE 20; .01 MG/ML; MG/ML
INJECTION, SOLUTION SUBCUTANEOUS
Status: DISPENSED
Start: 2017-06-08

## (undated) RX ORDER — DEXAMETHASONE SODIUM PHOSPHATE 4 MG/ML
INJECTION, SOLUTION INTRA-ARTICULAR; INTRALESIONAL; INTRAMUSCULAR; INTRAVENOUS; SOFT TISSUE
Status: DISPENSED
Start: 2023-05-09

## (undated) RX ORDER — FENTANYL CITRATE 50 UG/ML
INJECTION, SOLUTION INTRAMUSCULAR; INTRAVENOUS
Status: DISPENSED
Start: 2025-02-18

## (undated) RX ORDER — FENTANYL CITRATE 50 UG/ML
INJECTION, SOLUTION INTRAMUSCULAR; INTRAVENOUS
Status: DISPENSED
Start: 2021-03-09

## (undated) RX ORDER — REMIFENTANIL HYDROCHLORIDE 1 MG/ML
INJECTION, POWDER, LYOPHILIZED, FOR SOLUTION INTRAVENOUS
Status: DISPENSED
Start: 2017-06-08

## (undated) RX ORDER — GENTAMICIN 40 MG/ML
INJECTION, SOLUTION INTRAMUSCULAR; INTRAVENOUS
Status: DISPENSED
Start: 2023-05-09

## (undated) RX ORDER — CEFAZOLIN SODIUM 2 G/50ML
SOLUTION INTRAVENOUS
Status: DISPENSED
Start: 2025-02-18

## (undated) RX ORDER — OXYCODONE HCL 5 MG/5 ML
SOLUTION, ORAL ORAL
Status: DISPENSED
Start: 2021-03-09

## (undated) RX ORDER — BUPIVACAINE HYDROCHLORIDE 2.5 MG/ML
INJECTION, SOLUTION EPIDURAL; INFILTRATION; INTRACAUDAL
Status: DISPENSED
Start: 2025-02-18

## (undated) RX ORDER — BACITRACIN ZINC 500 [USP'U]/G
OINTMENT TOPICAL
Status: DISPENSED
Start: 2021-03-09

## (undated) RX ORDER — CEFAZOLIN SODIUM 1 G/3ML
INJECTION, POWDER, FOR SOLUTION INTRAMUSCULAR; INTRAVENOUS
Status: DISPENSED
Start: 2021-03-09

## (undated) RX ORDER — BENZOCAINE/MENTHOL 6 MG-10 MG
LOZENGE MUCOUS MEMBRANE
Status: DISPENSED
Start: 2017-06-08

## (undated) RX ORDER — CEFAZOLIN SODIUM 1 G/3ML
INJECTION, POWDER, FOR SOLUTION INTRAMUSCULAR; INTRAVENOUS
Status: DISPENSED
Start: 2023-05-09

## (undated) RX ORDER — DEXAMETHASONE SODIUM PHOSPHATE 4 MG/ML
INJECTION, SOLUTION INTRA-ARTICULAR; INTRALESIONAL; INTRAMUSCULAR; INTRAVENOUS; SOFT TISSUE
Status: DISPENSED
Start: 2025-02-18

## (undated) RX ORDER — DEXAMETHASONE SODIUM PHOSPHATE 4 MG/ML
INJECTION, SOLUTION INTRA-ARTICULAR; INTRALESIONAL; INTRAMUSCULAR; INTRAVENOUS; SOFT TISSUE
Status: DISPENSED
Start: 2017-06-08